# Patient Record
Sex: MALE | Race: WHITE | Employment: OTHER | URBAN - METROPOLITAN AREA
[De-identification: names, ages, dates, MRNs, and addresses within clinical notes are randomized per-mention and may not be internally consistent; named-entity substitution may affect disease eponyms.]

---

## 2017-09-20 ENCOUNTER — TELEPHONE (OUTPATIENT)
Dept: ADMINISTRATIVE | Age: 82
End: 2017-09-20

## 2019-11-11 RX ORDER — OXYBUTYNIN CHLORIDE 5 MG/1
TABLET ORAL
Qty: 180 TABLET | Refills: 0 | OUTPATIENT
Start: 2019-11-11

## 2019-11-11 RX ORDER — TAMSULOSIN HYDROCHLORIDE 0.4 MG/1
CAPSULE ORAL
Qty: 180 CAPSULE | Refills: 0 | OUTPATIENT
Start: 2019-11-11

## 2020-07-08 ENCOUNTER — TELEPHONE (OUTPATIENT)
Dept: INFECTIOUS DISEASES | Age: 85
End: 2020-07-08

## 2020-07-08 NOTE — LETTER
[facility] [address] [place] [address] [address]  [address] [address]  Phone: [phone]  Fax: [phone]    Facility:  [facility]    Fax:  [phone]  Ordering Provider: Dr. Shalonda Rosales. Alvarez    7/15/2020    Patient:  Javy Donato  MR Number:   [id number]  YOB: 1935    Please see below orders for:  Mr. Nikole Hillman       []   PICC Line Removal         []   Maintain PICC Line per Protocol Until:            []   Cath Flow Per Protocol       []   Alteplase Per Protocol       []   Repair/Replace PICC Line       [x]    Medication Therapy:  Continue Vanco and Ceftriaxone until 8/4/2020 along with  labs 3 times a week which are as follows WBC, Platelets, Creatinine, LFT, Vanco Trough- Please call Dr Michael Cowart with results at [phone] also please fax them to our office at [phone]- we have no lab results up to this point. If you have any questions or concerns, Please do not hesitate to call me. Verbally ordered and Approved by:Dr. Shalonda Rosales.  Michael Cowart

## 2020-07-15 NOTE — TELEPHONE ENCOUNTER
cont Vanco ceftriaoxne until August 4  WBC platelets creatinine LFTs and Vanco trough 3 times a week while on antibiotic   set up follow-up  Get lab results

## 2020-07-27 PROBLEM — S06.5XAA SUBDURAL HEMATOMA (HCC): Status: ACTIVE | Noted: 2019-02-09

## 2020-07-27 PROBLEM — R56.1 SEIZURE AFTER HEAD INJURY (HCC): Status: ACTIVE | Noted: 2019-02-15

## 2020-07-27 PROBLEM — E78.5 HYPERLIPIDEMIA: Status: ACTIVE | Noted: 2020-07-27

## 2020-07-27 PROBLEM — I05.9 MITRAL VALVE DISEASE: Status: ACTIVE | Noted: 2020-07-27

## 2020-07-27 PROBLEM — T84.54XA INFECTION OF PROSTHETIC LEFT KNEE JOINT (HCC): Status: ACTIVE | Noted: 2020-07-27

## 2020-07-27 PROBLEM — R06.02 SOB (SHORTNESS OF BREATH): Status: ACTIVE | Noted: 2020-07-27

## 2020-07-27 PROBLEM — E66.9 OBESITY: Status: ACTIVE | Noted: 2020-07-27

## 2020-07-27 RX ORDER — SENNA AND DOCUSATE SODIUM 50; 8.6 MG/1; MG/1
1 TABLET, FILM COATED ORAL DAILY
COMMUNITY

## 2020-07-27 RX ORDER — CEFTRIAXONE 2 G/1
2 INJECTION, POWDER, FOR SOLUTION INTRAMUSCULAR; INTRAVENOUS EVERY 24 HOURS
COMMUNITY

## 2020-07-27 RX ORDER — BISACODYL 10 MG
10 SUPPOSITORY, RECTAL RECTAL DAILY
COMMUNITY

## 2020-07-27 RX ORDER — HYDROCODONE BITARTRATE AND ACETAMINOPHEN 5; 325 MG/1; MG/1
1 TABLET ORAL EVERY 4 HOURS PRN
COMMUNITY
End: 2023-08-20

## 2020-07-27 RX ORDER — SENNOSIDES A AND B 8.6 MG/1
2 TABLET, FILM COATED ORAL NIGHTLY
COMMUNITY

## 2020-07-27 RX ORDER — ACETAMINOPHEN 500 MG
500 TABLET ORAL EVERY 6 HOURS PRN
COMMUNITY

## 2020-07-27 RX ORDER — SOLIFENACIN SUCCINATE 10 MG/1
5 TABLET, FILM COATED ORAL DAILY
COMMUNITY

## 2020-07-27 RX ORDER — ONDANSETRON 4 MG/1
4 TABLET, ORALLY DISINTEGRATING ORAL EVERY 8 HOURS PRN
COMMUNITY

## 2020-08-05 ENCOUNTER — TELEPHONE (OUTPATIENT)
Dept: INFECTIOUS DISEASES | Age: 85
End: 2020-08-05

## 2020-08-05 NOTE — TELEPHONE ENCOUNTER
Jamarcus Lee MD 8/4/2020 10:47 AM     Dr Kari Taylor, I received a fax from [facility]) on Janeann Deforest ([birth year]) reading last dose of antibiotics are 8/4/20. Can we pull PICC or do we need to maintain. Please advise. Phone #[phone]. Per dictation from [facility] on 6/30/20 impression and recommendation are as follows:    IMPRESSION:    1. 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 Hypertension I10    RECOMMENDATIONS:  cultures no growth so far  cont Vanco ceftriaoxne until August 4  WBC platelets creatinine LFTs and Vanco trough 3 times a week while on antibiotic  ID clinic in 2-3 weeks    *Patient is scheduled to see you in the office on 8/19/20. Please advise. Thank you, Giselle*     Read 8/4/2020 10:58 AM     8/4/2020 12:05 PM     Please advise. Thank you. Read 8/4/2020 1:37 PM     8/4/2020 1:37 PM     Let me see     8/4/2020 3:16 PM     Dr Kari Taylor, I am leaving for the day. If you have any orders on this, please follow up with Yuridia as she has a message out to you about this as well. Thanks, Giselle    Read 8/4/2020 3:17 PM     8/4/2020 3:18 PM     Remind me tomorrow in clinic     8/5/2020 8:25 AM     Dr Kari Taylor, this is your reminder to address this question about PICC removal. Thank you, Giselle     Read 8/5/2020 9:34 AM     8/5/2020 9:36 AM     D/c picc    *I called [facility] and spoke with Gabe Trejo. I informed her of Dr Chang Christopher order to d/c PICC. She voiced understanding. *

## 2020-08-19 ENCOUNTER — OFFICE VISIT (OUTPATIENT)
Dept: INFECTIOUS DISEASES | Age: 85
End: 2020-08-19
Payer: MEDICARE

## 2020-08-19 VITALS
DIASTOLIC BLOOD PRESSURE: 70 MMHG | SYSTOLIC BLOOD PRESSURE: 145 MMHG | HEART RATE: 73 BPM | HEIGHT: 69 IN | WEIGHT: 190 LBS | BODY MASS INDEX: 28.14 KG/M2 | TEMPERATURE: 97.4 F

## 2020-08-19 PROCEDURE — 99213 OFFICE O/P EST LOW 20 MIN: CPT | Performed by: INTERNAL MEDICINE

## 2020-08-19 PROCEDURE — G8427 DOCREV CUR MEDS BY ELIG CLIN: HCPCS | Performed by: INTERNAL MEDICINE

## 2020-08-19 PROCEDURE — 1123F ACP DISCUSS/DSCN MKR DOCD: CPT | Performed by: INTERNAL MEDICINE

## 2020-08-19 PROCEDURE — 4040F PNEUMOC VAC/ADMIN/RCVD: CPT | Performed by: INTERNAL MEDICINE

## 2020-08-19 PROCEDURE — G8417 CALC BMI ABV UP PARAM F/U: HCPCS | Performed by: INTERNAL MEDICINE

## 2020-08-19 PROCEDURE — 1036F TOBACCO NON-USER: CPT | Performed by: INTERNAL MEDICINE

## 2020-08-19 NOTE — LETTER
[facility]  [address] [place] [address],  [address]   [address]  [place], [address] [place] [address] Street  Phone: [phone]  Fax: [phone]                  SURGERY CLEARANCE FORM       Richard Aleman  1935 8/19/2020      Dear Dr. Clara Velasquez you for having me participate in the preoperative evaluation of your patient, Richard Aleman. Richard Aleman is cleared for surgery for knee replacement     I have made the following recommendations:      Richard Aleman will need:    antibiotic as per surgery protocol for knee replcement    If you have any questions, please feel free to call me.      Sincerely,      Juan Jose Nova

## 2020-08-19 NOTE — PROGRESS NOTES
[facility]    Follow-up NOTE           Visit date: 8/19/2020      Reason for visit /chief complaints   Knee infection    Assessment:      Diagnosis Orders   1. 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  Hypertension I10      Plan:     Completed vancomycin and ceftriaxone August 4  No objection new knee placement  Antibiotic as per perioperative protocol as per surgery      HPI:    Patient is 80-year-old male came in for follow-up for discharge from [facility].    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 ([address] 12/23/2008    Benign hypertensive heart disease without congestive heart failure 3/19/2007    CAD (coronary artery disease)     Chronic ischemic heart disease 11/27/2007    Hyperlipidemia 7/27/2020    Hypertension     Infection of prosthetic left knee joint ([address] 7/27/2020    Mitral valve disease 7/27/2020    Obesity 7/27/2020    Presence of aortocoronary bypass graft 3/19/2007    Presence of coronary angioplasty implant and graft 7/10/2006    Pure hypercholesterolemia 3/19/2007    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 ([address] 2/15/2019    SOB (shortness of breath) 7/27/2020    Spinal stenosis, lumbar region, without neurogenic claudication 12/12/2013    Subdural hematoma ([facility] 2/9/2019     Past Surgical History:   Procedure Laterality Date    ANKLE SURGERY Bilateral     RIGHT THEN LEFT- PINS    CARDIAC SURGERY      TRIPLE BYPASS    CARDIAC SURGERY      STENTS X 5    JOINT REPLACEMENT Right     KNEE REPLACEMENT    NERVE BLOCK  1/21/14    Duramorph celestone 9 mg     Social History     Socioeconomic History    Marital status:      Spouse name: None    Number of children: None    Years of education: None    Highest education level: None   Occupational History    Occupation: none     Employer: NONE   Social Needs    Financial resource strain: None    Food insecurity     Worry: None     Inability: None    Transportation needs     Medical: None     Non-medical: None   Tobacco Use    Smoking status: Former Smoker    Smokeless tobacco: Never Used   Substance and Sexual Activity    Alcohol use:  Yes    Drug use: No    Sexual activity: None   Lifestyle    Physical activity     Days per week: None     Minutes per session: None    Stress: None   Relationships    Social connections     Talks on phone: None     Gets together: None     Attends Worship service: None     Active member of club or organization: None     Attends meetings of clubs or organizations: None     Relationship status: None    Intimate partner violence     Fear of current or ex partner: None     Emotionally abused: None     Physically abused: None     Forced sexual activity: None   Other Topics Concern    None   Social History Narrative    None     Family History   Problem Relation Age of Onset    High Blood Pressure Mother     Heart Disease Mother     Cancer Mother     Heart Disease Father     High Blood Pressure Father        Current med     Current Outpatient Medications:     Calcium Carb-Cholecalciferol (CALCIUM-VITAMIN D) 500-200 MG-UNIT per tablet, Take 1 tablet by mouth 2 times daily (with meals), Disp: , Rfl:     bisacodyl (DULCOLAX) 10 MG suppository, Place 10 mg rectally daily, Disp: , Rfl:     sennosides-docusate sodium (SENOKOT-S) 8.6-50 MG tablet, Take 1 tablet by mouth daily, Disp: , Rfl:     HYDROcodone-acetaminophen (NORCO) 5-325 MG per tablet, Take 1 tablet by mouth every 4 hours as needed for Pain., Disp: , Rfl:     senna (SM SENNA LAXATIVE) 8.6 MG tablet, Take 2 tablets by mouth nightly, Disp: , Rfl:     solifenacin (VESICARE) 10 MG tablet, Take 5 mg by mouth daily, Disp: , Rfl:     carvedilol (COREG) 12.5 MG tablet, , Disp: , Rfl:     Handicap Placard MISC, by Does not apply route Expires in 5 years. , Disp: 1 each, Rfl: 0    traMADol (ULTRAM) 50 MG tablet, Take 1 tablet by mouth every 6 hours as needed, Disp: 90 tablet, Rfl: 0    rosuvastatin (CRESTOR) 10 MG tablet, Take 10 mg by mouth daily. , Disp: , Rfl:     aspirin 81 MG tablet, Take 81 mg by mouth daily. , Disp: , Rfl:     nitroGLYCERIN (NITROSTAT) 0.4 MG SL tablet, Place 0.4 mg under the tongue every 5 minutes as needed. , Disp: , Rfl:     tamsulosin (FLOMAX) 0.4 MG capsule, Take 0.4 mg by mouth daily. , Disp: , Rfl:     ondansetron (ZOFRAN-ODT) 4 MG disintegrating tablet, Take 4 mg by mouth every 8 hours as needed for Nausea or Vomiting, Disp: , Rfl:     acetaminophen (TYLENOL) 500 MG tablet, Take 500 mg by mouth every 6 hours as needed for Pain, Disp: , Rfl:     cefTRIAXone sodium 2 g SOLR, Infuse 2 g intravenously every 24 hours, Disp: , Rfl:     magnesium hydroxide (MILK OF MAGNESIA) 400 MG/5ML suspension, Take 30 mLs by mouth 2 times daily as needed for Constipation, Disp: , Rfl:     levETIRAcetam (KEPPRA) 500 MG tablet, Take 500 mg by mouth 2 times daily, Disp: , Rfl:      Review of Systems:  CONSTITUTIONAL:  negative  EYES:  negative  HEENT:  negative  RESPIRATORY:  negative  CARDIOVASCULAR:  negative  GASTROINTESTINAL:  negative  GENITOURINARY:  negative  INTEGUMENT/BREAST: negative  HEMATOLOGIC/LYMPHATIC:  negative  ALLERGIC/IMMUNOLOGIC:  negative  ENDOCRINE:  negative  MUSCULOSKELETAL:  negative  NEUROLOGICAL:  negative  BEHAVIOR/PSYCH:  negative    BP (!) 145/70 (Site: Right Upper Arm)   Pulse 73   Temp 97.4 °F (36.3 °C)   Ht 5' 9\" (1.753 m)   Wt 190 lb (86.2 kg)   BMI 28.06 kg/m²       EXAM:  CONSTITUTIONAL:  awake, alert, cooperative, no apparent distress,  EYES: conjunctiva normal  ENT:  Normocephalic, without obvious abnormality, atraumatic,  LUNGS:  No increased work of breathing, good air exchange, clear to auscultation bilaterally, no crackles or wheezing  CARDIOVASCULAR:  regular rate and rhythm, normal S1 and S2,  no murmur noted  ABDOMEN:   normal bowel sounds, soft, non-distended, non-tender, no masses palpated, no hepatosplenomegally,   MUSCULOSKELETAL:  There is no redness, warmth, or swelling of the joints. NEUROLOGIC:  Awake, alert, oriented to name, place and time  SKIN:  No rash      Data Review:  Reviewed  IMAGING:          Perez Merrill MD  8/19/2020  12:18 PM    This note was completed using a voice transcription system. Every effort was made to ensure accuracy. However, inadvertent computerized transcription errors may be present.

## 2020-12-31 ENCOUNTER — HOSPITAL ENCOUNTER (OUTPATIENT)
Age: 85
Setting detail: SPECIMEN
Discharge: HOME | End: 2020-12-31
Payer: MEDICARE

## 2020-12-31 LAB
ABSOLUTE EOS #: 0.35 K/UL (ref 0–0.44)
ABSOLUTE IMMATURE GRANULOCYTE: 0.03 K/UL (ref 0–0.3)
ABSOLUTE LYMPH #: 2.42 K/UL (ref 1.1–3.7)
ABSOLUTE MONO #: 0.58 K/UL (ref 0.1–1.2)
ALBUMIN SERPL-MCNC: 3.3 G/DL (ref 3.5–5.2)
ALBUMIN/GLOBULIN RATIO: 1.3 (ref 1–2.5)
ALP BLD-CCNC: 59 U/L (ref 40–129)
ALT SERPL-CCNC: 6 U/L (ref 5–41)
ANION GAP SERPL CALCULATED.3IONS-SCNC: 11 MMOL/L (ref 9–17)
AST SERPL-CCNC: 14 U/L
BASOPHILS # BLD: 0 % (ref 0–2)
BASOPHILS ABSOLUTE: <0.03 K/UL (ref 0–0.2)
BILIRUB SERPL-MCNC: 0.46 MG/DL (ref 0.3–1.2)
BUN BLDV-MCNC: 27 MG/DL (ref 8–23)
BUN/CREAT BLD: ABNORMAL (ref 9–20)
CALCIUM SERPL-MCNC: 8.8 MG/DL (ref 8.6–10.4)
CHLORIDE BLD-SCNC: 106 MMOL/L (ref 98–107)
CHOLESTEROL/HDL RATIO: 2.2
CHOLESTEROL: 95 MG/DL
CO2: 27 MMOL/L (ref 20–31)
CREAT SERPL-MCNC: 1.04 MG/DL (ref 0.7–1.2)
DIFFERENTIAL TYPE: ABNORMAL
EOSINOPHILS RELATIVE PERCENT: 6 % (ref 1–4)
GFR AFRICAN AMERICAN: >60 ML/MIN
GFR NON-AFRICAN AMERICAN: >60 ML/MIN
GFR SERPL CREATININE-BSD FRML MDRD: ABNORMAL ML/MIN/{1.73_M2}
GFR SERPL CREATININE-BSD FRML MDRD: ABNORMAL ML/MIN/{1.73_M2}
GLUCOSE BLD-MCNC: 84 MG/DL (ref 70–99)
HCT VFR BLD CALC: 39.1 % (ref 40.7–50.3)
HDLC SERPL-MCNC: 43 MG/DL
HEMOGLOBIN: 12.3 G/DL (ref 13–17)
IMMATURE GRANULOCYTES: 1 %
LDL CHOLESTEROL: 38 MG/DL (ref 0–130)
LYMPHOCYTES # BLD: 40 % (ref 24–43)
MCH RBC QN AUTO: 29.8 PG (ref 25.2–33.5)
MCHC RBC AUTO-ENTMCNC: 31.5 G/DL (ref 28.4–34.8)
MCV RBC AUTO: 94.7 FL (ref 82.6–102.9)
MONOCYTES # BLD: 10 % (ref 3–12)
NRBC AUTOMATED: 0 PER 100 WBC
PDW BLD-RTO: 13.9 % (ref 11.8–14.4)
PLATELET # BLD: 152 K/UL (ref 138–453)
PLATELET ESTIMATE: ABNORMAL
PMV BLD AUTO: 10.8 FL (ref 8.1–13.5)
POTASSIUM SERPL-SCNC: 4.2 MMOL/L (ref 3.7–5.3)
RBC # BLD: 4.13 M/UL (ref 4.21–5.77)
RBC # BLD: ABNORMAL 10*6/UL
SEG NEUTROPHILS: 43 % (ref 36–65)
SEGMENTED NEUTROPHILS ABSOLUTE COUNT: 2.71 K/UL (ref 1.5–8.1)
SODIUM BLD-SCNC: 144 MMOL/L (ref 135–144)
TOTAL PROTEIN: 5.8 G/DL (ref 6.4–8.3)
TRIGL SERPL-MCNC: 68 MG/DL
VITAMIN D 25-HYDROXY: 28.2 NG/ML (ref 30–100)
VLDLC SERPL CALC-MCNC: NORMAL MG/DL (ref 1–30)
WBC # BLD: 6.1 K/UL (ref 3.5–11.3)
WBC # BLD: ABNORMAL 10*3/UL

## 2020-12-31 PROCEDURE — 85025 COMPLETE CBC W/AUTO DIFF WBC: CPT

## 2020-12-31 PROCEDURE — 36415 COLL VENOUS BLD VENIPUNCTURE: CPT

## 2020-12-31 PROCEDURE — 80053 COMPREHEN METABOLIC PANEL: CPT

## 2020-12-31 PROCEDURE — 82306 VITAMIN D 25 HYDROXY: CPT

## 2020-12-31 PROCEDURE — 80061 LIPID PANEL: CPT

## 2020-12-31 PROCEDURE — P9603 ONE-WAY ALLOW PRORATED MILES: HCPCS

## 2021-01-28 ENCOUNTER — HOSPITAL ENCOUNTER (OUTPATIENT)
Age: 86
Setting detail: SPECIMEN
Discharge: HOME | End: 2021-01-28
Payer: MEDICARE

## 2021-01-28 LAB
ANION GAP SERPL CALCULATED.3IONS-SCNC: 8 MMOL/L (ref 9–17)
BUN BLDV-MCNC: 39 MG/DL (ref 8–23)
BUN/CREAT BLD: 31 (ref 9–20)
CALCIUM SERPL-MCNC: 9.1 MG/DL (ref 8.6–10.4)
CHLORIDE BLD-SCNC: 102 MMOL/L (ref 98–107)
CO2: 30 MMOL/L (ref 20–31)
CREAT SERPL-MCNC: 1.26 MG/DL (ref 0.7–1.2)
GFR AFRICAN AMERICAN: >60 ML/MIN
GFR NON-AFRICAN AMERICAN: 54 ML/MIN
GFR SERPL CREATININE-BSD FRML MDRD: ABNORMAL ML/MIN/{1.73_M2}
GFR SERPL CREATININE-BSD FRML MDRD: ABNORMAL ML/MIN/{1.73_M2}
GLUCOSE BLD-MCNC: 102 MG/DL (ref 70–99)
POTASSIUM SERPL-SCNC: 4.3 MMOL/L (ref 3.7–5.3)
SODIUM BLD-SCNC: 140 MMOL/L (ref 135–144)

## 2021-01-28 PROCEDURE — P9603 ONE-WAY ALLOW PRORATED MILES: HCPCS

## 2021-01-28 PROCEDURE — 80048 BASIC METABOLIC PNL TOTAL CA: CPT

## 2021-01-28 PROCEDURE — 36415 COLL VENOUS BLD VENIPUNCTURE: CPT

## 2022-08-10 ENCOUNTER — HOSPITAL ENCOUNTER (OUTPATIENT)
Age: 87
Setting detail: SPECIMEN
Discharge: HOME | End: 2022-08-10
Payer: MEDICARE

## 2022-08-10 LAB
ALBUMIN SERPL-MCNC: 3.8 G/DL (ref 3.5–5.2)
ALBUMIN/GLOBULIN RATIO: 1.4 (ref 1–2.5)
ALP BLD-CCNC: 63 U/L (ref 40–129)
ALT SERPL-CCNC: 15 U/L (ref 5–41)
ANION GAP SERPL CALCULATED.3IONS-SCNC: 12 MMOL/L (ref 9–17)
AST SERPL-CCNC: 21 U/L
BILIRUB SERPL-MCNC: 0.51 MG/DL (ref 0.3–1.2)
BILIRUBIN DIRECT: 0.16 MG/DL
BILIRUBIN, INDIRECT: 0.35 MG/DL (ref 0–1)
BUN BLDV-MCNC: 23 MG/DL (ref 8–23)
CALCIUM SERPL-MCNC: 9.2 MG/DL (ref 8.6–10.4)
CHLORIDE BLD-SCNC: 104 MMOL/L (ref 98–107)
CHOLESTEROL/HDL RATIO: 2.1
CHOLESTEROL: 106 MG/DL
CO2: 28 MMOL/L (ref 20–31)
CREAT SERPL-MCNC: 0.99 MG/DL (ref 0.7–1.2)
ESTIMATED AVERAGE GLUCOSE: 105 MG/DL
GFR AFRICAN AMERICAN: >60 ML/MIN
GFR NON-AFRICAN AMERICAN: >60 ML/MIN
GFR SERPL CREATININE-BSD FRML MDRD: NORMAL ML/MIN/{1.73_M2}
GLUCOSE BLD-MCNC: 89 MG/DL (ref 70–99)
HBA1C MFR BLD: 5.3 % (ref 4–6)
HCT VFR BLD CALC: 43.1 % (ref 40.7–50.3)
HDLC SERPL-MCNC: 50 MG/DL
HEMOGLOBIN: 13.5 G/DL (ref 13–17)
LDL CHOLESTEROL: 42 MG/DL (ref 0–130)
MCH RBC QN AUTO: 30.1 PG (ref 25.2–33.5)
MCHC RBC AUTO-ENTMCNC: 31.3 G/DL (ref 28.4–34.8)
MCV RBC AUTO: 96.2 FL (ref 82.6–102.9)
NRBC AUTOMATED: 0 PER 100 WBC
PDW BLD-RTO: 12.6 % (ref 11.8–14.4)
PLATELET # BLD: 145 K/UL (ref 138–453)
PMV BLD AUTO: 11.6 FL (ref 8.1–13.5)
POTASSIUM SERPL-SCNC: 3.9 MMOL/L (ref 3.7–5.3)
PROSTATE SPECIFIC ANTIGEN: 3.07 NG/ML
RBC # BLD: 4.48 M/UL (ref 4.21–5.77)
SODIUM BLD-SCNC: 144 MMOL/L (ref 135–144)
TOTAL PROTEIN: 6.6 G/DL (ref 6.4–8.3)
TRIGL SERPL-MCNC: 68 MG/DL
TSH SERPL DL<=0.05 MIU/L-ACNC: 2.26 UIU/ML (ref 0.3–5)
VITAMIN B-12: 313 PG/ML (ref 232–1245)
VITAMIN D 25-HYDROXY: 36.5 NG/ML
WBC # BLD: 6.9 K/UL (ref 3.5–11.3)

## 2022-08-10 PROCEDURE — 82607 VITAMIN B-12: CPT

## 2022-08-10 PROCEDURE — 36415 COLL VENOUS BLD VENIPUNCTURE: CPT

## 2022-08-10 PROCEDURE — 80076 HEPATIC FUNCTION PANEL: CPT

## 2022-08-10 PROCEDURE — P9603 ONE-WAY ALLOW PRORATED MILES: HCPCS

## 2022-08-10 PROCEDURE — 80061 LIPID PANEL: CPT

## 2022-08-10 PROCEDURE — 83036 HEMOGLOBIN GLYCOSYLATED A1C: CPT

## 2022-08-10 PROCEDURE — 82306 VITAMIN D 25 HYDROXY: CPT

## 2022-08-10 PROCEDURE — 84443 ASSAY THYROID STIM HORMONE: CPT

## 2022-08-10 PROCEDURE — 80048 BASIC METABOLIC PNL TOTAL CA: CPT

## 2022-08-10 PROCEDURE — 85027 COMPLETE CBC AUTOMATED: CPT

## 2022-08-10 PROCEDURE — 84153 ASSAY OF PSA TOTAL: CPT

## 2023-03-22 ENCOUNTER — HOSPITAL ENCOUNTER (OUTPATIENT)
Age: 88
Setting detail: SPECIMEN
Discharge: HOME | End: 2023-03-22
Payer: MEDICARE

## 2023-03-22 LAB
ALBUMIN SERPL-MCNC: 3.5 G/DL (ref 3.5–5.2)
ALBUMIN/GLOBULIN RATIO: 1.3 (ref 1–2.5)
ALP SERPL-CCNC: 48 U/L (ref 40–129)
ALT SERPL-CCNC: 12 U/L (ref 5–41)
ANION GAP SERPL CALCULATED.3IONS-SCNC: 11 MMOL/L (ref 9–17)
AST SERPL-CCNC: 20 U/L
BILIRUB DIRECT SERPL-MCNC: 0.1 MG/DL
BILIRUB INDIRECT SERPL-MCNC: 0.4 MG/DL (ref 0–1)
BILIRUB SERPL-MCNC: 0.5 MG/DL (ref 0.3–1.2)
BUN SERPL-MCNC: 23 MG/DL (ref 8–23)
CALCIUM SERPL-MCNC: 9.2 MG/DL (ref 8.6–10.4)
CHLORIDE SERPL-SCNC: 103 MMOL/L (ref 98–107)
CHOLEST SERPL-MCNC: 102 MG/DL
CHOLESTEROL/HDL RATIO: 2
CO2 SERPL-SCNC: 28 MMOL/L (ref 20–31)
CREAT SERPL-MCNC: 1.08 MG/DL (ref 0.7–1.2)
EST. AVERAGE GLUCOSE BLD GHB EST-MCNC: 111 MG/DL
GFR SERPL CREATININE-BSD FRML MDRD: >60 ML/MIN/1.73M2
GLUCOSE SERPL-MCNC: 86 MG/DL (ref 70–99)
HBA1C MFR BLD: 5.5 % (ref 4–6)
HCT VFR BLD AUTO: 39.8 % (ref 40.7–50.3)
HDLC SERPL-MCNC: 51 MG/DL
HGB BLD-MCNC: 13.1 G/DL (ref 13–17)
LDLC SERPL CALC-MCNC: 37 MG/DL (ref 0–130)
MCH RBC QN AUTO: 30.5 PG (ref 25.2–33.5)
MCHC RBC AUTO-ENTMCNC: 32.9 G/DL (ref 28.4–34.8)
MCV RBC AUTO: 92.8 FL (ref 82.6–102.9)
NRBC AUTOMATED: 0 PER 100 WBC
PDW BLD-RTO: 12.8 % (ref 11.8–14.4)
PLATELET # BLD AUTO: ABNORMAL K/UL (ref 138–453)
PLATELET, FLUORESCENCE: 139 K/UL (ref 138–453)
PLATELET, IMMATURE FRACTION: 4.2 % (ref 1.1–10.3)
POTASSIUM SERPL-SCNC: 4 MMOL/L (ref 3.7–5.3)
PROT SERPL-MCNC: 6.1 G/DL (ref 6.4–8.3)
RBC # BLD: 4.29 M/UL (ref 4.21–5.77)
SODIUM SERPL-SCNC: 142 MMOL/L (ref 135–144)
TRIGL SERPL-MCNC: 69 MG/DL
TSH SERPL-ACNC: 2.03 UIU/ML (ref 0.3–5)
WBC # BLD AUTO: 6.4 K/UL (ref 3.5–11.3)

## 2023-03-22 PROCEDURE — 85027 COMPLETE CBC AUTOMATED: CPT

## 2023-03-22 PROCEDURE — 84443 ASSAY THYROID STIM HORMONE: CPT

## 2023-03-22 PROCEDURE — 80061 LIPID PANEL: CPT

## 2023-03-22 PROCEDURE — 80048 BASIC METABOLIC PNL TOTAL CA: CPT

## 2023-03-22 PROCEDURE — 36415 COLL VENOUS BLD VENIPUNCTURE: CPT

## 2023-03-22 PROCEDURE — 83036 HEMOGLOBIN GLYCOSYLATED A1C: CPT

## 2023-03-22 PROCEDURE — 80076 HEPATIC FUNCTION PANEL: CPT

## 2023-03-22 PROCEDURE — P9603 ONE-WAY ALLOW PRORATED MILES: HCPCS

## 2023-03-22 PROCEDURE — 85055 RETICULATED PLATELET ASSAY: CPT

## 2023-08-19 ENCOUNTER — APPOINTMENT (OUTPATIENT)
Dept: CT IMAGING | Age: 88
End: 2023-08-19
Payer: MEDICARE

## 2023-08-19 ENCOUNTER — HOSPITAL ENCOUNTER (EMERGENCY)
Age: 88
Discharge: HOME | End: 2023-08-19
Attending: EMERGENCY MEDICINE
Payer: MEDICARE

## 2023-08-19 VITALS
HEIGHT: 70 IN | DIASTOLIC BLOOD PRESSURE: 82 MMHG | WEIGHT: 180 LBS | TEMPERATURE: 98.2 F | HEART RATE: 63 BPM | BODY MASS INDEX: 25.77 KG/M2 | RESPIRATION RATE: 20 BRPM | SYSTOLIC BLOOD PRESSURE: 174 MMHG | OXYGEN SATURATION: 96 %

## 2023-08-19 DIAGNOSIS — N12 PYELONEPHRITIS: Primary | ICD-10-CM

## 2023-08-19 LAB
ALBUMIN SERPL-MCNC: 4 G/DL (ref 3.5–5.2)
ALP SERPL-CCNC: 67 U/L (ref 40–129)
ALT SERPL-CCNC: 11 U/L (ref 5–41)
ANION GAP SERPL CALCULATED.3IONS-SCNC: 8 MMOL/L (ref 9–17)
AST SERPL-CCNC: 18 U/L
BACTERIA URNS QL MICRO: ABNORMAL
BASOPHILS # BLD: 0 K/UL (ref 0–0.2)
BASOPHILS NFR BLD: 1 % (ref 0–2)
BILIRUB SERPL-MCNC: 0.5 MG/DL (ref 0.3–1.2)
BILIRUB UR QL STRIP: NEGATIVE
BUN SERPL-MCNC: 20 MG/DL (ref 8–23)
CALCIUM SERPL-MCNC: 9.4 MG/DL (ref 8.6–10.4)
CASTS #/AREA URNS LPF: ABNORMAL /LPF
CHLORIDE SERPL-SCNC: 97 MMOL/L (ref 98–107)
CLARITY UR: ABNORMAL
CO2 SERPL-SCNC: 32 MMOL/L (ref 20–31)
COLOR UR: YELLOW
CREAT SERPL-MCNC: 1.1 MG/DL (ref 0.7–1.2)
EKG ATRIAL RATE: 59 BPM
EKG Q-T INTERVAL: 408 MS
EKG QRS DURATION: 90 MS
EKG QTC CALCULATION (BAZETT): 410 MS
EKG R AXIS: -11 DEGREES
EKG T AXIS: 26 DEGREES
EKG VENTRICULAR RATE: 61 BPM
EOSINOPHIL # BLD: 0.3 K/UL (ref 0–0.4)
EOSINOPHILS RELATIVE PERCENT: 4 % (ref 0–4)
EPI CELLS #/AREA URNS HPF: ABNORMAL /HPF
ERYTHROCYTE [DISTWIDTH] IN BLOOD BY AUTOMATED COUNT: 13 % (ref 11.5–14.9)
GFR SERPL CREATININE-BSD FRML MDRD: >60 ML/MIN/1.73M2
GLUCOSE SERPL-MCNC: 105 MG/DL (ref 70–99)
GLUCOSE UR STRIP-MCNC: NEGATIVE MG/DL
HCT VFR BLD AUTO: 43.2 % (ref 41–53)
HGB BLD-MCNC: 14.2 G/DL (ref 13.5–17.5)
HGB UR QL STRIP.AUTO: ABNORMAL
KETONES UR STRIP-MCNC: ABNORMAL MG/DL
LACTATE BLDV-SCNC: 0.8 MMOL/L (ref 0.5–2.2)
LEUKOCYTE ESTERASE UR QL STRIP: ABNORMAL
LIPASE SERPL-CCNC: 25 U/L (ref 13–60)
LYMPHOCYTES NFR BLD: 2.1 K/UL (ref 1–4.8)
LYMPHOCYTES RELATIVE PERCENT: 27 % (ref 24–44)
MCH RBC QN AUTO: 30.1 PG (ref 26–34)
MCHC RBC AUTO-ENTMCNC: 32.8 G/DL (ref 31–37)
MCV RBC AUTO: 91.8 FL (ref 80–100)
MONOCYTES NFR BLD: 0.8 K/UL (ref 0.1–1.3)
MONOCYTES NFR BLD: 11 % (ref 1–7)
NEUTROPHILS NFR BLD: 57 % (ref 36–66)
NEUTS SEG NFR BLD: 4.5 K/UL (ref 1.3–9.1)
NITRITE UR QL STRIP: NEGATIVE
PH UR STRIP: 6 [PH] (ref 5–8)
PLATELET # BLD AUTO: 159 K/UL (ref 150–450)
PMV BLD AUTO: 8.5 FL (ref 6–12)
POTASSIUM SERPL-SCNC: 4.2 MMOL/L (ref 3.7–5.3)
PROT SERPL-MCNC: 7.1 G/DL (ref 6.4–8.3)
PROT UR STRIP-MCNC: ABNORMAL MG/DL
RBC # BLD AUTO: 4.7 M/UL (ref 4.5–5.9)
RBC #/AREA URNS HPF: ABNORMAL /HPF
SODIUM SERPL-SCNC: 137 MMOL/L (ref 135–144)
SP GR UR STRIP: 1.02 (ref 1–1.03)
TROPONIN I SERPL HS-MCNC: 31 NG/L (ref 0–22)
UROBILINOGEN UR STRIP-ACNC: NORMAL EU/DL (ref 0–1)
WBC #/AREA URNS HPF: ABNORMAL /HPF
WBC OTHER # BLD: 7.7 K/UL (ref 3.5–11)

## 2023-08-19 PROCEDURE — 74177 CT ABD & PELVIS W/CONTRAST: CPT

## 2023-08-19 PROCEDURE — 83605 ASSAY OF LACTIC ACID: CPT

## 2023-08-19 PROCEDURE — 96365 THER/PROPH/DIAG IV INF INIT: CPT

## 2023-08-19 PROCEDURE — 6360000002 HC RX W HCPCS: Performed by: EMERGENCY MEDICINE

## 2023-08-19 PROCEDURE — 6360000004 HC RX CONTRAST MEDICATION: Performed by: EMERGENCY MEDICINE

## 2023-08-19 PROCEDURE — 85025 COMPLETE CBC W/AUTO DIFF WBC: CPT

## 2023-08-19 PROCEDURE — 84484 ASSAY OF TROPONIN QUANT: CPT

## 2023-08-19 PROCEDURE — 93005 ELECTROCARDIOGRAM TRACING: CPT | Performed by: EMERGENCY MEDICINE

## 2023-08-19 PROCEDURE — 83690 ASSAY OF LIPASE: CPT

## 2023-08-19 PROCEDURE — 81001 URINALYSIS AUTO W/SCOPE: CPT

## 2023-08-19 PROCEDURE — 2580000003 HC RX 258: Performed by: EMERGENCY MEDICINE

## 2023-08-19 PROCEDURE — 80053 COMPREHEN METABOLIC PANEL: CPT

## 2023-08-19 PROCEDURE — 36415 COLL VENOUS BLD VENIPUNCTURE: CPT

## 2023-08-19 PROCEDURE — 99285 EMERGENCY DEPT VISIT HI MDM: CPT

## 2023-08-19 PROCEDURE — 6370000000 HC RX 637 (ALT 250 FOR IP): Performed by: EMERGENCY MEDICINE

## 2023-08-19 RX ORDER — CIPROFLOXACIN 500 MG/1
500 TABLET, FILM COATED ORAL 2 TIMES DAILY
Qty: 14 TABLET | Refills: 0 | Status: SHIPPED | OUTPATIENT
Start: 2023-08-19 | End: 2023-08-26

## 2023-08-19 RX ORDER — 0.9 % SODIUM CHLORIDE 0.9 %
80 INTRAVENOUS SOLUTION INTRAVENOUS ONCE
Status: COMPLETED | OUTPATIENT
Start: 2023-08-19 | End: 2023-08-19

## 2023-08-19 RX ORDER — CEPHALEXIN 500 MG/1
500 CAPSULE ORAL 2 TIMES DAILY
Qty: 14 CAPSULE | Refills: 0 | Status: SHIPPED | OUTPATIENT
Start: 2023-08-19 | End: 2023-08-19 | Stop reason: CLARIF

## 2023-08-19 RX ORDER — ACETAMINOPHEN 325 MG/1
650 TABLET ORAL ONCE
Status: COMPLETED | OUTPATIENT
Start: 2023-08-19 | End: 2023-08-19

## 2023-08-19 RX ORDER — SODIUM CHLORIDE 0.9 % (FLUSH) 0.9 %
10 SYRINGE (ML) INJECTION PRN
Status: DISCONTINUED | OUTPATIENT
Start: 2023-08-19 | End: 2023-08-19 | Stop reason: HOSPADM

## 2023-08-19 RX ADMIN — SODIUM CHLORIDE, PRESERVATIVE FREE 10 ML: 5 INJECTION INTRAVENOUS at 12:39

## 2023-08-19 RX ADMIN — IOPAMIDOL 75 ML: 755 INJECTION, SOLUTION INTRAVENOUS at 12:33

## 2023-08-19 RX ADMIN — SODIUM CHLORIDE 80 ML: 9 INJECTION, SOLUTION INTRAVENOUS at 12:40

## 2023-08-19 RX ADMIN — SODIUM CHLORIDE 1000 MG: 900 INJECTION INTRAVENOUS at 14:00

## 2023-08-19 RX ADMIN — ACETAMINOPHEN 650 MG: 325 TABLET ORAL at 15:09

## 2023-08-19 ASSESSMENT — ENCOUNTER SYMPTOMS
VOMITING: 0
ABDOMINAL PAIN: 0
SORE THROAT: 0
DIARRHEA: 0
CONSTIPATION: 0
BACK PAIN: 0
COUGH: 0
SHORTNESS OF BREATH: 0
CHEST TIGHTNESS: 0
EYE PAIN: 0
NAUSEA: 0

## 2023-08-19 ASSESSMENT — PAIN - FUNCTIONAL ASSESSMENT: PAIN_FUNCTIONAL_ASSESSMENT: 0-10

## 2023-08-19 ASSESSMENT — PAIN SCALES - GENERAL: PAINLEVEL_OUTOF10: 7

## 2023-08-19 NOTE — ED TRIAGE NOTES
Mode of arrival (squad #, walk in, police, etc) : walk-in        Chief complaint(s): flank pain        Arrival Note (brief scenario, treatment PTA, etc). : Pt reports worsening flank pain x2 weeks. C= \"Have you ever felt that you should Cut down on your drinking? \"  No  A= \"Have people Annoyed you by criticizing your drinking? \"  No  G= \"Have you ever felt bad or Guilty about your drinking? \"  No  E= \"Have you ever had a drink as an Eye-opener first thing in the morning to steady your nerves or to help a hangover? \"  No      Deferred []      Reason for deferring: N/A    *If yes to two or more: probable alcohol abuse. *

## 2023-08-20 ENCOUNTER — HOSPITAL ENCOUNTER (EMERGENCY)
Age: 88
Discharge: HOME | End: 2023-08-20
Attending: EMERGENCY MEDICINE
Payer: MEDICARE

## 2023-08-20 VITALS
RESPIRATION RATE: 18 BRPM | HEART RATE: 66 BPM | HEIGHT: 70 IN | SYSTOLIC BLOOD PRESSURE: 129 MMHG | OXYGEN SATURATION: 95 % | WEIGHT: 180 LBS | DIASTOLIC BLOOD PRESSURE: 69 MMHG | TEMPERATURE: 98.5 F | BODY MASS INDEX: 25.77 KG/M2

## 2023-08-20 DIAGNOSIS — N12 PYELONEPHRITIS: Primary | ICD-10-CM

## 2023-08-20 LAB
ALBUMIN SERPL-MCNC: 3.6 G/DL (ref 3.5–5.2)
ALP SERPL-CCNC: 65 U/L (ref 40–129)
ALT SERPL-CCNC: 13 U/L (ref 5–41)
ANION GAP SERPL CALCULATED.3IONS-SCNC: 9 MMOL/L (ref 9–17)
AST SERPL-CCNC: 20 U/L
BASOPHILS # BLD: 0.1 K/UL (ref 0–0.2)
BASOPHILS NFR BLD: 1 % (ref 0–2)
BILIRUB SERPL-MCNC: 0.3 MG/DL (ref 0.3–1.2)
BUN SERPL-MCNC: 19 MG/DL (ref 8–23)
CALCIUM SERPL-MCNC: 9.2 MG/DL (ref 8.6–10.4)
CHLORIDE SERPL-SCNC: 103 MMOL/L (ref 98–107)
CO2 SERPL-SCNC: 30 MMOL/L (ref 20–31)
CREAT SERPL-MCNC: 1.2 MG/DL (ref 0.7–1.2)
EOSINOPHIL # BLD: 0.3 K/UL (ref 0–0.4)
EOSINOPHILS RELATIVE PERCENT: 4 % (ref 0–4)
ERYTHROCYTE [DISTWIDTH] IN BLOOD BY AUTOMATED COUNT: 13.3 % (ref 11.5–14.9)
GFR SERPL CREATININE-BSD FRML MDRD: 59 ML/MIN/1.73M2
GLUCOSE SERPL-MCNC: 126 MG/DL (ref 70–99)
HCT VFR BLD AUTO: 42.7 % (ref 41–53)
HGB BLD-MCNC: 13.4 G/DL (ref 13.5–17.5)
LIPASE SERPL-CCNC: 68 U/L (ref 13–60)
LYMPHOCYTES NFR BLD: 1.9 K/UL (ref 1–4.8)
LYMPHOCYTES RELATIVE PERCENT: 22 % (ref 24–44)
MAGNESIUM SERPL-MCNC: 2.2 MG/DL (ref 1.6–2.6)
MCH RBC QN AUTO: 28.9 PG (ref 26–34)
MCHC RBC AUTO-ENTMCNC: 31.3 G/DL (ref 31–37)
MCV RBC AUTO: 92.4 FL (ref 80–100)
MONOCYTES NFR BLD: 0.8 K/UL (ref 0.1–1.3)
MONOCYTES NFR BLD: 9 % (ref 1–7)
NEUTROPHILS NFR BLD: 64 % (ref 36–66)
NEUTS SEG NFR BLD: 5.5 K/UL (ref 1.3–9.1)
PLATELET # BLD AUTO: 184 K/UL (ref 150–450)
PMV BLD AUTO: 8.3 FL (ref 6–12)
POTASSIUM SERPL-SCNC: 4.1 MMOL/L (ref 3.7–5.3)
PROT SERPL-MCNC: 6.6 G/DL (ref 6.4–8.3)
RBC # BLD AUTO: 4.63 M/UL (ref 4.5–5.9)
SODIUM SERPL-SCNC: 142 MMOL/L (ref 135–144)
WBC OTHER # BLD: 8.5 K/UL (ref 3.5–11)

## 2023-08-20 PROCEDURE — 85025 COMPLETE CBC W/AUTO DIFF WBC: CPT

## 2023-08-20 PROCEDURE — 83735 ASSAY OF MAGNESIUM: CPT

## 2023-08-20 PROCEDURE — 80053 COMPREHEN METABOLIC PANEL: CPT

## 2023-08-20 PROCEDURE — 96374 THER/PROPH/DIAG INJ IV PUSH: CPT

## 2023-08-20 PROCEDURE — 6360000002 HC RX W HCPCS: Performed by: EMERGENCY MEDICINE

## 2023-08-20 PROCEDURE — 36415 COLL VENOUS BLD VENIPUNCTURE: CPT

## 2023-08-20 PROCEDURE — 99284 EMERGENCY DEPT VISIT MOD MDM: CPT

## 2023-08-20 PROCEDURE — 6370000000 HC RX 637 (ALT 250 FOR IP): Performed by: EMERGENCY MEDICINE

## 2023-08-20 PROCEDURE — 83690 ASSAY OF LIPASE: CPT

## 2023-08-20 RX ORDER — ONDANSETRON 2 MG/ML
4 INJECTION INTRAMUSCULAR; INTRAVENOUS ONCE
Status: COMPLETED | OUTPATIENT
Start: 2023-08-20 | End: 2023-08-20

## 2023-08-20 RX ORDER — HYDROCODONE BITARTRATE AND ACETAMINOPHEN 5; 325 MG/1; MG/1
1 TABLET ORAL EVERY 8 HOURS PRN
Qty: 12 TABLET | Refills: 0 | Status: SHIPPED | OUTPATIENT
Start: 2023-08-20 | End: 2023-08-24

## 2023-08-20 RX ORDER — HYDROCODONE BITARTRATE AND ACETAMINOPHEN 5; 325 MG/1; MG/1
1 TABLET ORAL ONCE
Status: COMPLETED | OUTPATIENT
Start: 2023-08-20 | End: 2023-08-20

## 2023-08-20 RX ADMIN — ONDANSETRON 4 MG: 2 INJECTION INTRAMUSCULAR; INTRAVENOUS at 12:01

## 2023-08-20 RX ADMIN — HYDROCODONE BITARTRATE AND ACETAMINOPHEN 1 TABLET: 5; 325 TABLET ORAL at 11:56

## 2023-08-20 ASSESSMENT — PAIN - FUNCTIONAL ASSESSMENT: PAIN_FUNCTIONAL_ASSESSMENT: 0-10

## 2023-08-20 ASSESSMENT — ENCOUNTER SYMPTOMS
COLOR CHANGE: 0
EYE PAIN: 0
ABDOMINAL PAIN: 0
BACK PAIN: 0
SHORTNESS OF BREATH: 0

## 2023-08-20 ASSESSMENT — PAIN SCALES - GENERAL
PAINLEVEL_OUTOF10: 6
PAINLEVEL_OUTOF10: 5

## 2023-08-20 ASSESSMENT — PAIN DESCRIPTION - LOCATION: LOCATION: FLANK

## 2023-08-20 NOTE — ED TRIAGE NOTES
Mode of arrival (squad #, walk in, police, etc) : [facility]    Chief complaint(s): flank pain    Arrival Note (brief scenario, treatment PTA, etc). : Pt arrives to ED c/o right-sided rear flank pain. Was seen yesterday for same, but states pain is uncontrolled. C= \"Have you ever felt that you should Cut down on your drinking? \"  No  A= \"Have people Annoyed you by criticizing your drinking? \"  No  G= \"Have you ever felt bad or Guilty about your drinking? \"  No  E= \"Have you ever had a drink as an Eye-opener first thing in the morning to steady your nerves or to help a hangover? \"  No      Deferred []      Reason for deferring: N/A    *If yes to two or more: probable alcohol abuse. *

## 2023-08-20 NOTE — ED PROVIDER NOTES
EMERGENCY DEPARTMENT ENCOUNTER    Pt Name: Michelle Murray  MRN: [id number]  [address] 1935  Date of evaluation: 8/20/23  CHIEF COMPLAINT       Chief Complaint   Patient presents with    Flank Pain     Left-sided rear flank pain - seen yesterday for pain, but sent home only with Tylenol for pain and pain is too much     HISTORY OF PRESENT ILLNESS   51-year-old male presents for complaints of right-sided flank pain. Patient reports been having worsening right flank pain for the last couple days. Was seen at the hospital yesterday and diagnosed with pyelonephritis. Reports that pain got worse again this morning and he presented again for evaluation. He denies any chest pain or shortness of breath. Denies any nausea or vomiting fevers or chills. He denies any difficulty urinating or pain with urination denies any blood in his urine that he has noted. The history is provided by the patient. REVIEW OF SYSTEMS     Review of Systems   Constitutional:  Negative for chills and fever. HENT:  Negative for congestion and ear pain. Eyes:  Negative for pain. Respiratory:  Negative for shortness of breath. Cardiovascular:  Negative for chest pain, palpitations and leg swelling. Gastrointestinal:  Negative for abdominal pain. Genitourinary:  Positive for flank pain. Negative for dysuria. Musculoskeletal:  Negative for back pain. Skin:  Negative for color change. Neurological:  Negative for numbness and headaches. Psychiatric/Behavioral:  Negative for confusion. All other systems reviewed and are negative.   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

## 2023-08-21 LAB
EKG ATRIAL RATE: 59 BPM
EKG Q-T INTERVAL: 408 MS
EKG QRS DURATION: 90 MS
EKG QTC CALCULATION (BAZETT): 410 MS
EKG R AXIS: -11 DEGREES
EKG T AXIS: 26 DEGREES
EKG VENTRICULAR RATE: 61 BPM

## 2023-08-30 ENCOUNTER — HOSPITAL ENCOUNTER (OUTPATIENT)
Age: 88
Setting detail: SPECIMEN
Discharge: HOME | End: 2023-08-30
Payer: MEDICARE

## 2023-08-30 LAB — PSA SERPL-MCNC: 4.43 NG/ML

## 2023-08-30 PROCEDURE — 84153 ASSAY OF PSA TOTAL: CPT

## 2023-08-30 PROCEDURE — 36415 COLL VENOUS BLD VENIPUNCTURE: CPT

## 2023-08-30 PROCEDURE — P9603 ONE-WAY ALLOW PRORATED MILES: HCPCS

## 2023-11-01 ENCOUNTER — HOSPITAL ENCOUNTER (OUTPATIENT)
Age: 88
Setting detail: SPECIMEN
Discharge: HOME | End: 2023-11-01
Payer: MEDICARE

## 2023-11-01 LAB
ALBUMIN SERPL-MCNC: 3.7 G/DL (ref 3.5–5.2)
ALBUMIN/GLOB SERPL: 1.4 {RATIO} (ref 1–2.5)
ALP SERPL-CCNC: 69 U/L (ref 40–129)
ALT SERPL-CCNC: 15 U/L (ref 5–41)
ANION GAP SERPL CALCULATED.3IONS-SCNC: 9 MMOL/L (ref 9–17)
AST SERPL-CCNC: 22 U/L
BILIRUB DIRECT SERPL-MCNC: 0.1 MG/DL
BILIRUB INDIRECT SERPL-MCNC: 0.3 MG/DL (ref 0–1)
BILIRUB SERPL-MCNC: 0.4 MG/DL (ref 0.3–1.2)
BUN SERPL-MCNC: 23 MG/DL (ref 8–23)
CALCIUM SERPL-MCNC: 8.7 MG/DL (ref 8.6–10.4)
CHLORIDE SERPL-SCNC: 101 MMOL/L (ref 98–107)
CHOLEST SERPL-MCNC: 113 MG/DL
CHOLESTEROL/HDL RATIO: 1.9
CO2 SERPL-SCNC: 31 MMOL/L (ref 20–31)
CREAT SERPL-MCNC: 1 MG/DL (ref 0.7–1.2)
ERYTHROCYTE [DISTWIDTH] IN BLOOD BY AUTOMATED COUNT: 12.8 % (ref 11.8–14.4)
EST. AVERAGE GLUCOSE BLD GHB EST-MCNC: 111 MG/DL
GFR SERPL CREATININE-BSD FRML MDRD: >60 ML/MIN/1.73M2
GLUCOSE SERPL-MCNC: 91 MG/DL (ref 70–99)
HBA1C MFR BLD: 5.5 % (ref 4–6)
HCT VFR BLD AUTO: 44.7 % (ref 40.7–50.3)
HDLC SERPL-MCNC: 59 MG/DL
HGB BLD-MCNC: 14.1 G/DL (ref 13–17)
LDLC SERPL CALC-MCNC: 46 MG/DL (ref 0–130)
MCH RBC QN AUTO: 30.1 PG (ref 25.2–33.5)
MCHC RBC AUTO-ENTMCNC: 31.5 G/DL (ref 28.4–34.8)
MCV RBC AUTO: 95.3 FL (ref 82.6–102.9)
NRBC BLD-RTO: 0 PER 100 WBC
PLATELET # BLD AUTO: 147 K/UL (ref 138–453)
PMV BLD AUTO: 10.8 FL (ref 8.1–13.5)
POTASSIUM SERPL-SCNC: 4 MMOL/L (ref 3.7–5.3)
PROT SERPL-MCNC: 6.4 G/DL (ref 6.4–8.3)
RBC # BLD AUTO: 4.69 M/UL (ref 4.21–5.77)
SODIUM SERPL-SCNC: 141 MMOL/L (ref 135–144)
TRIGL SERPL-MCNC: 40 MG/DL
TSH SERPL DL<=0.05 MIU/L-ACNC: 2.2 UIU/ML (ref 0.3–5)
WBC OTHER # BLD: 6 K/UL (ref 3.5–11.3)

## 2023-11-01 PROCEDURE — 80048 BASIC METABOLIC PNL TOTAL CA: CPT

## 2023-11-01 PROCEDURE — 85027 COMPLETE CBC AUTOMATED: CPT

## 2023-11-01 PROCEDURE — 80061 LIPID PANEL: CPT

## 2023-11-01 PROCEDURE — P9603 ONE-WAY ALLOW PRORATED MILES: HCPCS

## 2023-11-01 PROCEDURE — 83036 HEMOGLOBIN GLYCOSYLATED A1C: CPT

## 2023-11-01 PROCEDURE — 80076 HEPATIC FUNCTION PANEL: CPT

## 2023-11-01 PROCEDURE — 84443 ASSAY THYROID STIM HORMONE: CPT

## 2023-11-01 PROCEDURE — 36415 COLL VENOUS BLD VENIPUNCTURE: CPT

## 2024-03-08 ENCOUNTER — HOSPITAL ENCOUNTER (INPATIENT)
Age: 89
LOS: 8 days | Discharge: OTHER INSTITUTION | DRG: 286 | End: 2024-03-16
Attending: EMERGENCY MEDICINE
Payer: MEDICARE

## 2024-03-08 ENCOUNTER — APPOINTMENT (OUTPATIENT)
Dept: GENERAL RADIOLOGY | Age: 89
DRG: 286 | End: 2024-03-08
Payer: MEDICARE

## 2024-03-08 DIAGNOSIS — I25.5 ISCHEMIC CARDIOMYOPATHY: ICD-10-CM

## 2024-03-08 DIAGNOSIS — I50.9 CONGESTIVE HEART FAILURE (CHF) (HCC): ICD-10-CM

## 2024-03-08 DIAGNOSIS — M79.89 LEG SWELLING: Primary | ICD-10-CM

## 2024-03-08 DIAGNOSIS — R06.02 SOB (SHORTNESS OF BREATH): ICD-10-CM

## 2024-03-08 DIAGNOSIS — I50.9 NEW ONSET OF CONGESTIVE HEART FAILURE (HCC): ICD-10-CM

## 2024-03-08 DIAGNOSIS — I25.10 CORONARY ARTERY DISEASE INVOLVING NATIVE HEART WITHOUT ANGINA PECTORIS, UNSPECIFIED VESSEL OR LESION TYPE: ICD-10-CM

## 2024-03-08 DIAGNOSIS — I25.10 CORONARY ARTERY DISEASE: ICD-10-CM

## 2024-03-08 DIAGNOSIS — J18.9 PNEUMONIA OF RIGHT LOWER LOBE DUE TO INFECTIOUS ORGANISM: ICD-10-CM

## 2024-03-08 LAB
ALBUMIN SERPL-MCNC: 4.1 G/DL (ref 3.5–5.2)
ALBUMIN/GLOB SERPL: 2 {RATIO} (ref 1–2.5)
ALP SERPL-CCNC: 69 U/L (ref 40–129)
ALT SERPL-CCNC: 11 U/L (ref 10–50)
ANION GAP SERPL CALCULATED.3IONS-SCNC: 9 MMOL/L (ref 9–16)
AST SERPL-CCNC: 26 U/L (ref 10–50)
BASOPHILS # BLD: 0.03 K/UL (ref 0–0.2)
BASOPHILS NFR BLD: 0 % (ref 0–2)
BILIRUB DIRECT SERPL-MCNC: <0.2 MG/DL (ref 0–0.3)
BILIRUB INDIRECT SERPL-MCNC: 0.3 MG/DL (ref 0–1)
BILIRUB SERPL-MCNC: 0.5 MG/DL (ref 0–1.2)
BNP SERPL-MCNC: 799 PG/ML (ref 0–300)
BUN SERPL-MCNC: 25 MG/DL (ref 8–23)
CALCIUM SERPL-MCNC: 9 MG/DL (ref 8.6–10.4)
CHLORIDE SERPL-SCNC: 97 MMOL/L (ref 98–107)
CO2 SERPL-SCNC: 29 MMOL/L (ref 20–31)
CREAT SERPL-MCNC: 1.1 MG/DL (ref 0.7–1.2)
EOSINOPHIL # BLD: 0.27 K/UL (ref 0–0.44)
EOSINOPHILS RELATIVE PERCENT: 4 % (ref 1–4)
ERYTHROCYTE [DISTWIDTH] IN BLOOD BY AUTOMATED COUNT: 13.1 % (ref 11.8–14.4)
GFR SERPL CREATININE-BSD FRML MDRD: >60 ML/MIN/1.73M2
GLOBULIN SER CALC-MCNC: 2.7 G/DL
GLUCOSE SERPL-MCNC: 93 MG/DL (ref 74–99)
HCT VFR BLD AUTO: 41.6 % (ref 40.7–50.3)
HGB BLD-MCNC: 12.8 G/DL (ref 13–17)
IMM GRANULOCYTES # BLD AUTO: <0.03 K/UL (ref 0–0.3)
IMM GRANULOCYTES NFR BLD: 0 %
LYMPHOCYTES NFR BLD: 1.82 K/UL (ref 1.1–3.7)
LYMPHOCYTES RELATIVE PERCENT: 26 % (ref 24–43)
MCH RBC QN AUTO: 29.9 PG (ref 25.2–33.5)
MCHC RBC AUTO-ENTMCNC: 30.8 G/DL (ref 28.4–34.8)
MCV RBC AUTO: 97.2 FL (ref 82.6–102.9)
MONOCYTES NFR BLD: 0.72 K/UL (ref 0.1–1.2)
MONOCYTES NFR BLD: 10 % (ref 3–12)
NEUTROPHILS NFR BLD: 60 % (ref 36–65)
NEUTS SEG NFR BLD: 4.26 K/UL (ref 1.5–8.1)
NRBC BLD-RTO: 0 PER 100 WBC
PLATELET # BLD AUTO: 176 K/UL (ref 138–453)
PMV BLD AUTO: 9.8 FL (ref 8.1–13.5)
POTASSIUM SERPL-SCNC: 4.5 MMOL/L (ref 3.7–5.3)
PROCALCITONIN SERPL-MCNC: 0.06 NG/ML (ref 0–0.09)
PROT SERPL-MCNC: 6.8 G/DL (ref 6.6–8.7)
RBC # BLD AUTO: 4.28 M/UL (ref 4.21–5.77)
SODIUM SERPL-SCNC: 135 MMOL/L (ref 136–145)
TROPONIN I SERPL HS-MCNC: 42 NG/L (ref 0–22)
TROPONIN I SERPL HS-MCNC: 44 NG/L (ref 0–22)
TROPONIN I SERPL HS-MCNC: 47 NG/L (ref 0–22)
WBC OTHER # BLD: 7.1 K/UL (ref 3.5–11.3)

## 2024-03-08 PROCEDURE — 71046 X-RAY EXAM CHEST 2 VIEWS: CPT

## 2024-03-08 PROCEDURE — 80076 HEPATIC FUNCTION PANEL: CPT

## 2024-03-08 PROCEDURE — 80048 BASIC METABOLIC PNL TOTAL CA: CPT

## 2024-03-08 PROCEDURE — 84145 PROCALCITONIN (PCT): CPT

## 2024-03-08 PROCEDURE — 84484 ASSAY OF TROPONIN QUANT: CPT

## 2024-03-08 PROCEDURE — 83880 ASSAY OF NATRIURETIC PEPTIDE: CPT

## 2024-03-08 PROCEDURE — 6370000000 HC RX 637 (ALT 250 FOR IP): Performed by: NURSE PRACTITIONER

## 2024-03-08 PROCEDURE — 99223 1ST HOSP IP/OBS HIGH 75: CPT | Performed by: STUDENT IN AN ORGANIZED HEALTH CARE EDUCATION/TRAINING PROGRAM

## 2024-03-08 PROCEDURE — 93005 ELECTROCARDIOGRAM TRACING: CPT | Performed by: STUDENT IN AN ORGANIZED HEALTH CARE EDUCATION/TRAINING PROGRAM

## 2024-03-08 PROCEDURE — 6360000002 HC RX W HCPCS: Performed by: NURSE PRACTITIONER

## 2024-03-08 PROCEDURE — 99285 EMERGENCY DEPT VISIT HI MDM: CPT

## 2024-03-08 PROCEDURE — 2580000003 HC RX 258: Performed by: NURSE PRACTITIONER

## 2024-03-08 PROCEDURE — 85025 COMPLETE CBC W/AUTO DIFF WBC: CPT

## 2024-03-08 PROCEDURE — 2580000003 HC RX 258: Performed by: STUDENT IN AN ORGANIZED HEALTH CARE EDUCATION/TRAINING PROGRAM

## 2024-03-08 PROCEDURE — 6360000002 HC RX W HCPCS: Performed by: STUDENT IN AN ORGANIZED HEALTH CARE EDUCATION/TRAINING PROGRAM

## 2024-03-08 PROCEDURE — 2060000000 HC ICU INTERMEDIATE R&B

## 2024-03-08 RX ORDER — POLYETHYLENE GLYCOL 3350 17 G/17G
17 POWDER, FOR SOLUTION ORAL DAILY PRN
Status: DISCONTINUED | OUTPATIENT
Start: 2024-03-08 | End: 2024-03-16 | Stop reason: HOSPADM

## 2024-03-08 RX ORDER — FUROSEMIDE 10 MG/ML
40 INJECTION INTRAMUSCULAR; INTRAVENOUS 2 TIMES DAILY
Status: DISCONTINUED | OUTPATIENT
Start: 2024-03-08 | End: 2024-03-11

## 2024-03-08 RX ORDER — ENOXAPARIN SODIUM 100 MG/ML
40 INJECTION SUBCUTANEOUS DAILY
Status: DISCONTINUED | OUTPATIENT
Start: 2024-03-08 | End: 2024-03-11

## 2024-03-08 RX ORDER — CALCIUM CARBONATE/VITAMIN D3 600 MG-10
TABLET ORAL
Status: ON HOLD | COMMUNITY
Start: 2024-01-01 | End: 2024-03-16 | Stop reason: HOSPADM

## 2024-03-08 RX ORDER — ACETAMINOPHEN 325 MG/1
650 TABLET ORAL EVERY 6 HOURS PRN
Status: DISCONTINUED | OUTPATIENT
Start: 2024-03-08 | End: 2024-03-15 | Stop reason: SDUPTHER

## 2024-03-08 RX ORDER — ALBUTEROL SULFATE 2.5 MG/3ML
2.5 SOLUTION RESPIRATORY (INHALATION) EVERY 6 HOURS PRN
Status: DISCONTINUED | OUTPATIENT
Start: 2024-03-08 | End: 2024-03-16 | Stop reason: HOSPADM

## 2024-03-08 RX ORDER — CARVEDILOL 12.5 MG/1
12.5 TABLET ORAL 2 TIMES DAILY
Status: DISCONTINUED | OUTPATIENT
Start: 2024-03-08 | End: 2024-03-12

## 2024-03-08 RX ORDER — LISINOPRIL 5 MG/1
TABLET ORAL
Status: ON HOLD | COMMUNITY
Start: 2024-03-01 | End: 2024-03-16 | Stop reason: HOSPADM

## 2024-03-08 RX ORDER — SODIUM CHLORIDE 9 MG/ML
INJECTION, SOLUTION INTRAVENOUS PRN
Status: DISCONTINUED | OUTPATIENT
Start: 2024-03-08 | End: 2024-03-16 | Stop reason: HOSPADM

## 2024-03-08 RX ORDER — ONDANSETRON 4 MG/1
4 TABLET, ORALLY DISINTEGRATING ORAL EVERY 8 HOURS PRN
Status: DISCONTINUED | OUTPATIENT
Start: 2024-03-08 | End: 2024-03-16 | Stop reason: HOSPADM

## 2024-03-08 RX ORDER — TAMSULOSIN HYDROCHLORIDE 0.4 MG/1
0.4 CAPSULE ORAL DAILY
Status: DISCONTINUED | OUTPATIENT
Start: 2024-03-08 | End: 2024-03-16 | Stop reason: HOSPADM

## 2024-03-08 RX ORDER — CARVEDILOL 3.12 MG/1
TABLET ORAL
Status: ON HOLD | COMMUNITY
Start: 2024-03-01 | End: 2024-03-16 | Stop reason: HOSPADM

## 2024-03-08 RX ORDER — FUROSEMIDE 10 MG/ML
20 INJECTION INTRAMUSCULAR; INTRAVENOUS ONCE
Status: COMPLETED | OUTPATIENT
Start: 2024-03-08 | End: 2024-03-08

## 2024-03-08 RX ORDER — ASPIRIN 81 MG/1
81 TABLET ORAL DAILY
Status: DISCONTINUED | OUTPATIENT
Start: 2024-03-08 | End: 2024-03-16 | Stop reason: HOSPADM

## 2024-03-08 RX ORDER — LISINOPRIL 5 MG/1
5 TABLET ORAL DAILY
Status: DISCONTINUED | OUTPATIENT
Start: 2024-03-09 | End: 2024-03-09

## 2024-03-08 RX ORDER — MAGNESIUM SULFATE IN WATER 40 MG/ML
2000 INJECTION, SOLUTION INTRAVENOUS PRN
Status: DISCONTINUED | OUTPATIENT
Start: 2024-03-08 | End: 2024-03-16 | Stop reason: HOSPADM

## 2024-03-08 RX ORDER — SODIUM CHLORIDE 0.9 % (FLUSH) 0.9 %
10 SYRINGE (ML) INJECTION PRN
Status: DISCONTINUED | OUTPATIENT
Start: 2024-03-08 | End: 2024-03-16 | Stop reason: HOSPADM

## 2024-03-08 RX ORDER — MIRTAZAPINE 7.5 MG/1
7.5 TABLET, FILM COATED ORAL NIGHTLY
COMMUNITY

## 2024-03-08 RX ORDER — ONDANSETRON 2 MG/ML
4 INJECTION INTRAMUSCULAR; INTRAVENOUS EVERY 6 HOURS PRN
Status: DISCONTINUED | OUTPATIENT
Start: 2024-03-08 | End: 2024-03-16 | Stop reason: HOSPADM

## 2024-03-08 RX ORDER — MULTIVITAMIN WITH FOLIC ACID 400 MCG
TABLET ORAL
COMMUNITY
Start: 2024-03-01

## 2024-03-08 RX ORDER — SODIUM CHLORIDE 0.9 % (FLUSH) 0.9 %
5-40 SYRINGE (ML) INJECTION EVERY 12 HOURS SCHEDULED
Status: DISCONTINUED | OUTPATIENT
Start: 2024-03-08 | End: 2024-03-16 | Stop reason: HOSPADM

## 2024-03-08 RX ORDER — VENLAFAXINE 25 MG/1
TABLET ORAL
COMMUNITY

## 2024-03-08 RX ORDER — POTASSIUM CHLORIDE 20 MEQ/1
40 TABLET, EXTENDED RELEASE ORAL PRN
Status: DISCONTINUED | OUTPATIENT
Start: 2024-03-08 | End: 2024-03-16 | Stop reason: HOSPADM

## 2024-03-08 RX ORDER — ACETAMINOPHEN 650 MG/1
650 SUPPOSITORY RECTAL EVERY 6 HOURS PRN
Status: DISCONTINUED | OUTPATIENT
Start: 2024-03-08 | End: 2024-03-16 | Stop reason: HOSPADM

## 2024-03-08 RX ORDER — POTASSIUM CHLORIDE 7.45 MG/ML
10 INJECTION INTRAVENOUS PRN
Status: DISCONTINUED | OUTPATIENT
Start: 2024-03-08 | End: 2024-03-16 | Stop reason: HOSPADM

## 2024-03-08 RX ADMIN — ENOXAPARIN SODIUM 40 MG: 100 INJECTION SUBCUTANEOUS at 18:41

## 2024-03-08 RX ADMIN — AZITHROMYCIN MONOHYDRATE 500 MG: 500 INJECTION, POWDER, LYOPHILIZED, FOR SOLUTION INTRAVENOUS at 16:37

## 2024-03-08 RX ADMIN — FUROSEMIDE 40 MG: 10 INJECTION, SOLUTION INTRAMUSCULAR; INTRAVENOUS at 18:42

## 2024-03-08 RX ADMIN — FUROSEMIDE 20 MG: 10 INJECTION, SOLUTION INTRAMUSCULAR; INTRAVENOUS at 15:00

## 2024-03-08 RX ADMIN — ACETAMINOPHEN 650 MG: 325 TABLET ORAL at 20:03

## 2024-03-08 RX ADMIN — CARVEDILOL 12.5 MG: 12.5 TABLET, FILM COATED ORAL at 21:30

## 2024-03-08 RX ADMIN — TAMSULOSIN HYDROCHLORIDE 0.4 MG: 0.4 CAPSULE ORAL at 18:52

## 2024-03-08 RX ADMIN — SODIUM CHLORIDE, PRESERVATIVE FREE 10 ML: 5 INJECTION INTRAVENOUS at 21:30

## 2024-03-08 RX ADMIN — CEFTRIAXONE SODIUM 1000 MG: 1 INJECTION, POWDER, FOR SOLUTION INTRAMUSCULAR; INTRAVENOUS at 15:00

## 2024-03-08 ASSESSMENT — PAIN SCALES - GENERAL
PAINLEVEL_OUTOF10: 0
PAINLEVEL_OUTOF10: 5

## 2024-03-08 NOTE — ED PROVIDER NOTES
[facility] ED  Emergency Department Encounter  Emergency Medicine Resident     Pt Name:Michael Mcghee  MRN: [id number]  Birthdate 1935  Date of evaluation: 3/8/24  PCP:  Shaun Peralta DO  Note Started: 11:07 AM EST      CHIEF COMPLAINT       Chief Complaint   Patient presents with    Shortness of Breath     \"For months\"    Leg Swelling     bilateral       HISTORY OF PRESENT ILLNESS  (Location/Symptom, Timing/Onset, Context/Setting, Quality, Duration, Modifying Factors, Severity.)      Michael Mcghee is a 88 y.o. male who presents with bilateral leg swelling as well as shortness of breath.  Presents with niece who provides majority of history.  Patient coming in from nursing facility.  Symptoms ongoing for reportedly months.  Niece has been concerned the patient has not been being taking care of while at his facility.  Patient has no known history of congestive heart failure, takes no water pills.  Patient is nonambulatory at baseline.  Denies any chest pain.  No fevers or chills.  No cough or congestion.  Denies having issues with this in the past.    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       has a past surgical history that includes Ankle surgery (Bilateral); Cardiac surgery; Cardiac surgery; joint  8.6-50 MG tablet Take 1 tablet by mouth daily    Evangelista Buck MD   magnesium hydroxide (MILK OF MAGNESIA) 400 MG/5ML suspension Take 30 mLs by mouth 2 times daily as needed for Constipation    ProviderEvangelista MD   senna (SM SENNA LAXATIVE) 8.6 MG tablet Take 2 tablets by mouth nightly    Evangelista Buck MD   solifenacin (VESICARE) 10 MG tablet Take 0.5 tablets by mouth daily    Evangelista Buck MD   levETIRAcetam (KEPPRA) 500 MG tablet Take 500 mg by mouth 2 times daily  Patient not taking: Reported on 8/20/2023    Evangelista Buck MD   carvedilol (COREG) 12.5 MG tablet  3/17/16   Evangelista Buck MD   Handicap Placard MISC by Does not apply route Expires in 5 years. 4/28/16   Shaun Peralta DO   traMADol (ULTRAM) 50 MG tablet Take 1 tablet by mouth every 6 hours as needed  Patient not taking: Reported on 8/20/2023 4/15/16   Vannessa Mckinney, ROD   rosuvastatin (CRESTOR) 10 MG tablet Take 1 tablet by mouth daily    Evangelista Buck MD   aspirin 81 MG tablet Take 1 tablet by mouth daily    Evangelista Buck MD   nitroGLYCERIN (NITROSTAT) 0.4 MG SL tablet Place 1 tablet under the tongue every 5 minutes as needed    Evangelista Buck MD   tamsulosin (FLOMAX) 0.4 MG capsule Take 1 capsule by mouth daily    ProviderEvangelista MD       REVIEW OF SYSTEMS       Review of Systems   Constitutional:  Negative for chills and fever.   Respiratory:  Positive for shortness of breath.    Cardiovascular:  Positive for leg swelling. Negative for chest pain.   Gastrointestinal:  Negative for abdominal pain.   Neurological:  Negative for headaches.       PHYSICAL EXAM      INITIAL VITALS:   BP (!) 174/81   Pulse 85   Temp 97 °F (36.1 °C) (Oral)   Resp 27   Wt 81.6 kg (179 lb 14.3 oz)   SpO2 94%   BMI 25.81 kg/m²     Physical Exam  Vitals reviewed.   Constitutional:       General: He is not in acute distress.     Appearance: Normal appearance. He is ill-appearing

## 2024-03-08 NOTE — ED NOTES
Pt family continuously chasing  writer around ED for update regarding room status, or to have tests completed such as ECHO. Family also requesting that writer call room placement. Family continuously told that room placement and procedures are out of RNs hands. RN supervisor notified.

## 2024-03-08 NOTE — H&P
[facility]  Office: [phone]  Haris Bledsoe DO, Dudley Reece DO, Dennys Church DO, Jerad Felder DO, Ade Putnam MD, Thi Prather MD, Refugio Hendrickson MD, Perla Schaeffer MD,  Devan Sauer MD, Blanka Knox MD, Rosalinda Mitchell MD,  Rosalind Cabello DO, Lorene Barrett MD, Iain Garcia MD, Stalin Bledsoe DO, Darlene Woodall MD,  Tremayne Acevedo DO, Karen Vidal MD, Tina Vivar MD, Krystal Harding MD, Ileana Soriano MD,  Dereck Hernandez MD, Elvia Osullivan MD, Remigio Jaimes MD, Nathaniel Awan MD, Casey Lay MD, Omero Keys MD, Robin Landin DO, Real Alanis DO, Marianela Taylor MD,  Kane Oswald MD, Shirley Waterhouse, CNP,  Abby Meadows, CNP, Camden Rehman, CNP,  Christi Ellis, JUAN MANUEL, Christine Conn, CNP, Loraine Suarez, CNP, Demetria Pham CNP, Nicki Darby, CNP, Elizabeth Christopher, CNP, Trini Garcia, PA-C, Christin Brownlee PA-C, Marli Guzman, CNP, Velvet Olivares, CNP, Ayse Layton, CNP, Tara He, CNS, Kori Claudoi, CNP, Kimberly Hassan, CNP, Tracy Schwab, CNP         [facility]   IN-PATIENT SERVICE   [facility]    HISTORY AND PHYSICAL EXAMINATION            Date:   3/8/2024  Patient name:  Michael Mcghee  Date of admission:  3/8/2024 11:05 AM  MRN:   [id number]  Account:  [id number]  YOB: 1935  PCP:    Shaun Peralta DO  Room:   34/  Code Status:    No Order    Chief Complaint:     Chief Complaint   Patient presents with    Shortness of Breath     \"For months\"    Leg Swelling     bilateral       History Obtained From:     patient, electronic medical record    History of Present Illness:     Michael Mcghee is a 88 y.o. male with PMHx of CAD s/p CABG in 1990, HTN, BPH, HLD and Hx of left knee replacement who presents with Shortness of Breath (\"For months\") and worsening leg Swelling (bilateral).  Patient is admitted to the hospital for the management of CHF with unknown LVEF (HCC).  Patient lives in an assisted living  facility and has been having increasing shortness of breath and bilateral leg swelling that have worsened over the past few days.  Patient has been unable to lay flat completely flat in bed and has been sleeping in bedside chair.  Patient's niece is in the room and states he has also been having increased wheezing.  Patient denies having any chest pain, lightheadedness, dizziness, fever, chills, nausea or vomiting    In the ED, patient slightly tachypneic and hypertensive at 162/68.  Patient resting on room air, saturating 95%.  CMP and CBC were largely unremarkable.  Patient WBC WNL.  proBNP was elevated at 799, no prior to compare with.  Initial troponin was 47, repeat was 44.  CXR showed bibasilar atelectasis with slightly more focal opacity in the right lower lobe lung base possibly representing consolidation from pneumonia.  Cardiomegaly with evidence of prior CABG.  No overt pulmonary edema.  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 (HCC) 12/23/2008    Benign hypertensive heart disease without congestive heart failure 3/19/2007    CAD (coronary artery disease)     Chronic ischemic heart disease 11/27/2007    Hyperlipidemia 7/27/2020    Hypertension     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) 7/27/2020    Mitral valve disease 7/27/2020    Obesity 7/27/2020    Presence of aortocoronary bypass graft 3/19/2007    Presence of coronary angioplasty implant and graft 7/10/2006    Pure hypercholesterolemia 3/19/2007    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

## 2024-03-08 NOTE — ED NOTES
Pt presents to ED via wheelchair through triage c/o shortness of breath for months and bilateral leg swelling. Pt reports living at nursing facility. Pt leg swelling swelling is bilateral and pitting. Pt denies any chest pain or nausea/vomiting. Pt reports \"cannot breath when laying flat.\"  Pt has audible wheezing upon arrival.  Pt reports compliant with all medications.  Pt reports wheelchair baseline. Aox4.

## 2024-03-08 NOTE — ED PROVIDER NOTES
[facility]     Emergency Department     Faculty Attestation    I performed a history and physical examination of the patient and discussed management with the resident. I reviewed the resident’s note and agree with the documented findings and plan of care. Any areas of disagreement are noted on the chart. I was personally present for the key portions of any procedures. I have documented in the chart those procedures where I was not present during the key portions. I have reviewed the emergency nurses triage note. I agree with the chief complaint, past medical history, past surgical history, allergies, medications, social and family history as documented unless otherwise noted below.   For Physician Assistant/ Nurse Practitioner cases/documentation I have personally evaluated this patient and have completed at least one if not all key elements of the E/M (history, physical exam, and MDM). Additional findings are as noted.      Primary Care Physician:  Shaun Peralta DO    CHIEF COMPLAINT       Chief Complaint   Patient presents with    Shortness of Breath     \"For months\"    Leg Swelling     bilateral       RECENT VITALS:   Temp: 97 °F (36.1 °C),  Pulse: 67, Respirations: (!) 32, BP: (!) 162/68    LABS:  Labs Reviewed   CBC WITH AUTO DIFFERENTIAL - Abnormal; Notable for the following components:       Result Value    Hemoglobin 12.8 (*)     All other components within normal limits   BASIC METABOLIC PANEL - Abnormal; Notable for the following components:    Sodium 135 (*)     Chloride 97 (*)     BUN 25 (*)     All other components within normal limits   TROPONIN - Abnormal; Notable for the following components:    Troponin, High Sensitivity 47 (*)     All other components within normal limits   BRAIN NATRIURETIC PEPTIDE - Abnormal; Notable for the following components:    Pro- (*)     All other components within normal limits   HEPATIC FUNCTION PANEL   TROPONIN         PERTINENT

## 2024-03-08 NOTE — ED NOTES
600-10 MG-MCG TABS PER TAB        CARVEDILOL (COREG) 12.5 MG TABLET        CARVEDILOL (COREG) 3.125 MG TABLET        CEFTRIAXONE SODIUM 2 G SOLR    Infuse 2 g intravenously every 24 hours    HANDICAP PLACARD MISC    by Does not apply route Expires in 5 years.    LEVETIRACETAM (KEPPRA) 500 MG TABLET    Take 500 mg by mouth 2 times daily    LISINOPRIL (PRINIVIL;ZESTRIL) 5 MG TABLET        MAGNESIUM HYDROXIDE (MILK OF MAGNESIA) 400 MG/5ML SUSPENSION    Take 30 mLs by mouth 2 times daily as needed for Constipation    MIRTAZAPINE (REMERON) 7.5 MG TABLET    Take 1 tablet by mouth nightly    MULTIPLE VITAMIN (MULTIVITAMIN) TABLET        NITROGLYCERIN (NITROSTAT) 0.4 MG SL TABLET    Place 1 tablet under the tongue every 5 minutes as needed    ONDANSETRON (ZOFRAN-ODT) 4 MG DISINTEGRATING TABLET    Take 1 tablet by mouth every 8 hours as needed for Nausea or Vomiting    ROSUVASTATIN (CRESTOR) 10 MG TABLET    Take 1 tablet by mouth daily At bedtime    SENNA (SM SENNA LAXATIVE) 8.6 MG TABLET    Take 2 tablets by mouth nightly    SENNOSIDES-DOCUSATE SODIUM (SENOKOT-S) 8.6-50 MG TABLET    Take 1 tablet by mouth daily    SOLIFENACIN (VESICARE) 10 MG TABLET    Take 0.5 tablets by mouth daily    TAMSULOSIN (FLOMAX) 0.4 MG CAPSULE    Take 1 capsule by mouth daily    TRAMADOL (ULTRAM) 50 MG TABLET    Take 1 tablet by mouth every 6 hours as needed    VENLAFAXINE (EFFEXOR) 25 MG TABLET    1 tablet with food Orally Twice a day for 30 day(s)     Orders Placed This Encounter   Medications    furosemide (LASIX) injection 20 mg    cefTRIAXone (ROCEPHIN) 1,000 mg in sodium chloride 0.9 % 50 mL IVPB (mini-bag)     Order Specific Question:   Antimicrobial Indications     Answer:   Pneumonia (CAP)    azithromycin (ZITHROMAX) 500 mg in sodium chloride 0.9 % 250 mL IVPB ([id number])     Order Specific Question:   Antimicrobial Indications     Answer:   Pneumonia (CAP)    aspirin EC tablet 81 mg    carvedilol (COREG) tablet 12.5 mg    tamsulosin  7/27/2020    Presence of aortocoronary bypass graft 3/19/2007    Presence of coronary angioplasty implant and graft 7/10/2006    Pure hypercholesterolemia 3/19/2007    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) 2/15/2019    SOB (shortness of breath) 7/27/2020    Spinal stenosis, lumbar region, without neurogenic claudication 12/12/2013    Subdural hematoma (HCC) 2/9/2019       Labs:  Labs Reviewed   CBC WITH AUTO DIFFERENTIAL - Abnormal; Notable for the following components:       Result Value    Hemoglobin 12.8 (*)     All other components within normal limits   BASIC METABOLIC PANEL - Abnormal; Notable for the following components:    Sodium 135 (*)     Chloride 97 (*)     BUN 25 (*)     All other components within normal limits   TROPONIN - Abnormal; Notable for the following components:    Troponin, High Sensitivity 47 (*)     All other components within normal limits   BRAIN NATRIURETIC PEPTIDE - Abnormal; Notable for the following components:    Pro- (*)     All other components within normal limits   TROPONIN - Abnormal; Notable for the following components:    Troponin, High Sensitivity 44 (*)     All other components within normal limits   HEPATIC FUNCTION PANEL   TROPONIN   PROCALCITONIN   BASIC METABOLIC PANEL   MAGNESIUM   LIPID PANEL   TSH   BRAIN NATRIURETIC PEPTIDE   CBC WITH AUTO DIFFERENTIAL       Electronically signed by Susan Lowe RN on 3/8/2024 at 6:55 PM

## 2024-03-09 ENCOUNTER — APPOINTMENT (OUTPATIENT)
Dept: GENERAL RADIOLOGY | Age: 89
DRG: 286 | End: 2024-03-09
Payer: MEDICARE

## 2024-03-09 PROBLEM — R79.89 ELEVATED TROPONIN: Status: ACTIVE | Noted: 2024-03-09

## 2024-03-09 LAB
ANION GAP SERPL CALCULATED.3IONS-SCNC: 12 MMOL/L (ref 9–16)
BASOPHILS # BLD: 0.04 K/UL (ref 0–0.2)
BASOPHILS NFR BLD: 1 % (ref 0–2)
BNP SERPL-MCNC: 1244 PG/ML (ref 0–300)
BUN SERPL-MCNC: 23 MG/DL (ref 8–23)
CALCIUM SERPL-MCNC: 9 MG/DL (ref 8.6–10.4)
CHLORIDE SERPL-SCNC: 97 MMOL/L (ref 98–107)
CHOLEST SERPL-MCNC: 111 MG/DL (ref 0–199)
CHOLESTEROL/HDL RATIO: 2
CO2 SERPL-SCNC: 29 MMOL/L (ref 20–31)
CREAT SERPL-MCNC: 1 MG/DL (ref 0.7–1.2)
EKG ATRIAL RATE: 67 BPM
EKG P AXIS: 9 DEGREES
EKG P-R INTERVAL: 210 MS
EKG Q-T INTERVAL: 392 MS
EKG QRS DURATION: 94 MS
EKG QTC CALCULATION (BAZETT): 414 MS
EKG R AXIS: 5 DEGREES
EKG T AXIS: 55 DEGREES
EKG VENTRICULAR RATE: 67 BPM
EOSINOPHIL # BLD: 0.18 K/UL (ref 0–0.44)
EOSINOPHILS RELATIVE PERCENT: 2 % (ref 1–4)
ERYTHROCYTE [DISTWIDTH] IN BLOOD BY AUTOMATED COUNT: 12.8 % (ref 11.8–14.4)
GFR SERPL CREATININE-BSD FRML MDRD: >60 ML/MIN/1.73M2
GLUCOSE SERPL-MCNC: 91 MG/DL (ref 74–99)
HCT VFR BLD AUTO: 42.1 % (ref 40.7–50.3)
HDLC SERPL-MCNC: 56 MG/DL
HGB BLD-MCNC: 13.5 G/DL (ref 13–17)
IMM GRANULOCYTES # BLD AUTO: <0.03 K/UL (ref 0–0.3)
IMM GRANULOCYTES NFR BLD: 0 %
LDLC SERPL CALC-MCNC: 37 MG/DL (ref 0–100)
LYMPHOCYTES NFR BLD: 1.97 K/UL (ref 1.1–3.7)
LYMPHOCYTES RELATIVE PERCENT: 26 % (ref 24–43)
MAGNESIUM SERPL-MCNC: 2 MG/DL (ref 1.6–2.4)
MCH RBC QN AUTO: 30.3 PG (ref 25.2–33.5)
MCHC RBC AUTO-ENTMCNC: 32.1 G/DL (ref 28.4–34.8)
MCV RBC AUTO: 94.6 FL (ref 82.6–102.9)
MONOCYTES NFR BLD: 0.85 K/UL (ref 0.1–1.2)
MONOCYTES NFR BLD: 11 % (ref 3–12)
NEUTROPHILS NFR BLD: 60 % (ref 36–65)
NEUTS SEG NFR BLD: 4.53 K/UL (ref 1.5–8.1)
NRBC BLD-RTO: 0 PER 100 WBC
PLATELET # BLD AUTO: 176 K/UL (ref 138–453)
PMV BLD AUTO: 9.9 FL (ref 8.1–13.5)
POTASSIUM SERPL-SCNC: 3.8 MMOL/L (ref 3.7–5.3)
RBC # BLD AUTO: 4.45 M/UL (ref 4.21–5.77)
SODIUM SERPL-SCNC: 138 MMOL/L (ref 136–145)
TRIGL SERPL-MCNC: 93 MG/DL
TSH SERPL DL<=0.05 MIU/L-ACNC: 1.35 UIU/ML (ref 0.27–4.2)
VLDLC SERPL CALC-MCNC: 19 MG/DL
WBC OTHER # BLD: 7.6 K/UL (ref 3.5–11.3)

## 2024-03-09 PROCEDURE — 80061 LIPID PANEL: CPT

## 2024-03-09 PROCEDURE — 84443 ASSAY THYROID STIM HORMONE: CPT

## 2024-03-09 PROCEDURE — 71046 X-RAY EXAM CHEST 2 VIEWS: CPT

## 2024-03-09 PROCEDURE — 6370000000 HC RX 637 (ALT 250 FOR IP): Performed by: NURSE PRACTITIONER

## 2024-03-09 PROCEDURE — 36415 COLL VENOUS BLD VENIPUNCTURE: CPT

## 2024-03-09 PROCEDURE — 80048 BASIC METABOLIC PNL TOTAL CA: CPT

## 2024-03-09 PROCEDURE — 93010 ELECTROCARDIOGRAM REPORT: CPT | Performed by: INTERNAL MEDICINE

## 2024-03-09 PROCEDURE — 99223 1ST HOSP IP/OBS HIGH 75: CPT | Performed by: INTERNAL MEDICINE

## 2024-03-09 PROCEDURE — 6360000002 HC RX W HCPCS: Performed by: NURSE PRACTITIONER

## 2024-03-09 PROCEDURE — 2580000003 HC RX 258: Performed by: NURSE PRACTITIONER

## 2024-03-09 PROCEDURE — 83735 ASSAY OF MAGNESIUM: CPT

## 2024-03-09 PROCEDURE — 6370000000 HC RX 637 (ALT 250 FOR IP): Performed by: STUDENT IN AN ORGANIZED HEALTH CARE EDUCATION/TRAINING PROGRAM

## 2024-03-09 PROCEDURE — 2060000000 HC ICU INTERMEDIATE R&B

## 2024-03-09 PROCEDURE — 83880 ASSAY OF NATRIURETIC PEPTIDE: CPT

## 2024-03-09 PROCEDURE — 85025 COMPLETE CBC W/AUTO DIFF WBC: CPT

## 2024-03-09 PROCEDURE — 99232 SBSQ HOSP IP/OBS MODERATE 35: CPT | Performed by: NURSE PRACTITIONER

## 2024-03-09 RX ORDER — GUAIFENESIN 600 MG/1
600 TABLET, EXTENDED RELEASE ORAL 2 TIMES DAILY
Status: DISCONTINUED | OUTPATIENT
Start: 2024-03-09 | End: 2024-03-16 | Stop reason: HOSPADM

## 2024-03-09 RX ORDER — FUROSEMIDE 10 MG/ML
40 INJECTION INTRAMUSCULAR; INTRAVENOUS ONCE
Status: COMPLETED | OUTPATIENT
Start: 2024-03-09 | End: 2024-03-09

## 2024-03-09 RX ORDER — LOSARTAN POTASSIUM 50 MG/1
25 TABLET ORAL DAILY
Status: DISCONTINUED | OUTPATIENT
Start: 2024-03-09 | End: 2024-03-16 | Stop reason: HOSPADM

## 2024-03-09 RX ORDER — ROSUVASTATIN CALCIUM 10 MG/1
10 TABLET, COATED ORAL DAILY
Status: DISCONTINUED | OUTPATIENT
Start: 2024-03-09 | End: 2024-03-16 | Stop reason: HOSPADM

## 2024-03-09 RX ADMIN — ACETAMINOPHEN 650 MG: 325 TABLET ORAL at 18:02

## 2024-03-09 RX ADMIN — FUROSEMIDE 40 MG: 10 INJECTION, SOLUTION INTRAMUSCULAR; INTRAVENOUS at 18:03

## 2024-03-09 RX ADMIN — CARVEDILOL 12.5 MG: 12.5 TABLET, FILM COATED ORAL at 20:21

## 2024-03-09 RX ADMIN — FUROSEMIDE 40 MG: 10 INJECTION, SOLUTION INTRAMUSCULAR; INTRAVENOUS at 00:19

## 2024-03-09 RX ADMIN — CARVEDILOL 12.5 MG: 12.5 TABLET, FILM COATED ORAL at 08:07

## 2024-03-09 RX ADMIN — ROSUVASTATIN CALCIUM 10 MG: 10 TABLET, COATED ORAL at 08:08

## 2024-03-09 RX ADMIN — TAMSULOSIN HYDROCHLORIDE 0.4 MG: 0.4 CAPSULE ORAL at 08:07

## 2024-03-09 RX ADMIN — SODIUM CHLORIDE, PRESERVATIVE FREE 10 ML: 5 INJECTION INTRAVENOUS at 20:22

## 2024-03-09 RX ADMIN — SODIUM CHLORIDE, PRESERVATIVE FREE 10 ML: 5 INJECTION INTRAVENOUS at 08:09

## 2024-03-09 RX ADMIN — ASPIRIN 81 MG: 81 TABLET, COATED ORAL at 08:09

## 2024-03-09 RX ADMIN — GUAIFENESIN 600 MG: 600 TABLET, EXTENDED RELEASE ORAL at 20:22

## 2024-03-09 RX ADMIN — FUROSEMIDE 40 MG: 10 INJECTION, SOLUTION INTRAMUSCULAR; INTRAVENOUS at 08:08

## 2024-03-09 RX ADMIN — ENOXAPARIN SODIUM 40 MG: 100 INJECTION SUBCUTANEOUS at 08:07

## 2024-03-09 RX ADMIN — GUAIFENESIN 600 MG: 600 TABLET, EXTENDED RELEASE ORAL at 09:21

## 2024-03-09 RX ADMIN — LOSARTAN POTASSIUM 25 MG: 50 TABLET, FILM COATED ORAL at 08:07

## 2024-03-09 ASSESSMENT — PAIN SCALES - GENERAL: PAINLEVEL_OUTOF10: 8

## 2024-03-09 NOTE — PROGRESS NOTES
[facility]  Office: [phone]  Haris Bledsoe DO, Dudley Reece DO, Dennys Church DO, Jerad Felder DO, Ade Putnam MD, Thi Prather MD, Refugio Hendrickson MD, Perla Schaeffer MD,  Devan Sauer MD, Blanka Knox MD, Rosalinda Mitchell MD,  Rosalind Cabello DO, Lorene Barrett MD, Iain Garcia MD, Stalin Bledsoe DO, Darlene Woodall MD,  Tremayne Acevedo DO, Karen Vidal MD, Tina Vivar MD, Krystal Harding MD, Ileana Soriano MD,  Dereck Hernandez MD, Elvia Osullivan MD, Remigio Jaimes MD, Nathaniel Awan MD, Casey Lay MD, Omero Keys MD, Robin Landin DO, Real Alanis DO, Marianela Taylor MD,  Kane Oswald MD, Shirley Waterhouse, CNP,  Abby Meaodws, CNP, Camden Rehman, CNP,  Christi Ellis, DNP, Christine Conn, CNP, Loraine Suarez, CNP, Demetria Pham CNP, Nicki Darby, CNP, Elizabeth Christopher, CNP, Trini Garcia, PA-C, Christin Brownlee, PA-C, Marli Guzman, CNP, Velvet Olivares, CNP, Ayse Layton, CNP, Tara He, CNS, Kori Claudio, CNP, Kimberly Hassan, CNP, Tracy Schwab, CNP         [facility]   IN-PATIENT SERVICE   [facility]    Progress Note    3/9/2024    11:33 AM    Name:   Michael Mcghee  MRN:     [id number]     Acct:      [id number]   Room:   2007/2007-01  IP Day:  1  Admit Date:  3/8/2024 11:05 AM    PCP:   Shaun Peralta DO  Code Status:  Full Code    Subjective:     C/C:   Chief Complaint   Patient presents with    Shortness of Breath     \"For months\"    Leg Swelling     bilateral     Interval History Status: improved.     Seen and examined at bedside. Denies CP. Patient reports SOB mildly improved but leg edema markedly improved.     VS and labs reviewed. Awaiting echo completion. SR with PVC's on monitor.     Discussed POC with nursing and patient. Patient requesting discharge.     Discussed CODE STATUS. Patient requests to remain full code. He is agreeable to intubation, CPR and defibrillation.     Brief History:     Michael Mchgee is  BID    tamsulosin  0.4 mg Oral Daily    sodium chloride flush  5-40 mL IntraVENous 2 times per day    enoxaparin  40 mg SubCUTAneous Daily    furosemide  40 mg IntraVENous BID     Continuous Infusions:    sodium chloride       PRN Meds: sodium chloride flush, sodium chloride, ondansetron **OR** ondansetron, polyethylene glycol, acetaminophen **OR** acetaminophen, potassium chloride **OR** potassium alternative oral replacement **OR** potassium chloride, magnesium sulfate, albuterol    Data:     Past Medical History:   has a past medical history of Arthropathy, unspecified, other specified sites, 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    Social History:   reports that he has quit smoking. He has never used smokeless tobacco. He reports current alcohol use. He reports that he does not use drugs.     Family History:   Family History   Problem Relation Age of Onset    High Blood Pressure Mother     Heart Disease Mother     Cancer Mother     Heart Disease Father     High Blood Pressure Father        Vitals:  BP (!) 157/72   Pulse 83   Temp 98.4 °F (36.9 °C) (Oral)   Resp 26   Wt 81.6 kg (179 lb 14.3 oz)   SpO2 92%   BMI 25.81 kg/m²   Temp (24hrs), Av.7 °F (36.5 °C), Min:97.2 °F (36.2 °C), Max:98.4 °F (36.9 °C)    No results for input(s): \"POCGLU\" in the last 72 hours.    I/O (24Hr):    Intake/Output Summary (Last 24 hours) at 3/9/2024 5731  Last data filed

## 2024-03-09 NOTE — CARE COORDINATION
Attempted to do interview. Patient eating breakfast and requests CM come back later.    2441 Patient not in room    1610 Spoke to sukumar Yari at length about patient. She relates he recently lost his wife. She says the care he is getting at [facility] is very poor. CM told Yari that CM could help with placement to SNF if patient interested.    Problem: Wound:  Goal: Will show signs of wound healing; wound closure and no evidence of infection  Will show signs of wound healing; wound closure and no evidence of infection   Outcome: Ongoing      Problem: Blood Glucose:  Goal: Ability to maintain appropriate glucose levels will improve  Ability to maintain appropriate glucose levels will improve   Outcome: Ongoing      Problem: Arterial:  Goal: Optimize blood flow for wound healing  Optimize blood flow for wound healing   Outcome: Ongoing

## 2024-03-09 NOTE — PROGRESS NOTES
Pt destating to high 80s, refusing o2 at this time. Pt educated on importance, still refusing. Will reassess later.

## 2024-03-09 NOTE — CONSULTS
[facility]    Consult / H&P               Today's Date: 3/9/2024  Patient Name: Michael Mcghee  Date of admission: 3/8/2024 11:05 AM  Patient's age: 88 y.o., 1935  Admission Dx: SOB (shortness of breath) [R06.02]  Leg swelling [M79.89]  CHF with unknown LVEF (HCC) [I50.9]  Pneumonia of right lower lobe due to infectious organism [J18.9]  New onset of congestive heart failure (HCC) [I50.9]    Requesting Physician: No admitting provider for patient encounter.    Cardiac Evaluation Reason: CHF    History Obtained From: patient and chart review     History of Present Illness:    This patient 88 y.o. years old with past medical history of CABG in 1990, hypertension, BPH, hyperlipidemia, who was brought to the ED with progressive shortness of breath, leg swelling, orthopnea and PND.  Found to have elevated proBNP.  Minimally elevated troponin with a flat trend 45 and 47.  CBC and BMP unremarkable.  EKG showed normal sinus rhythm no acute ST-T wave changes.    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    Past Surgical History:   has a past surgical history that includes Ankle surgery (Bilateral); Cardiac surgery; Cardiac surgery; joint replacement (Right); and Nerve Block (1/21/14).     Home Medications:    Prior to Admission medications   have seen and examined the patient and the key elements of the encounter have been performed by me. I agree with the assessment, plan and orders as documented by the resident With changes made to the note.     Electronically signed by Paluie Cobb MD on 3/9/2024 at 3:03 PM.    [facility]      [phone]

## 2024-03-09 NOTE — PLAN OF CARE
Problem: Safety - Adult  Goal: Free from fall injury  3/9/2024 0847 by Rachael Talamantes RN  Outcome: Progressing  3/9/2024 0028 by Ami Pereira RN  Outcome: Progressing     Problem: Discharge Planning  Goal: Discharge to home or other facility with appropriate resources  3/9/2024 0847 by Rachael Talamantes RN  Outcome: Progressing  3/9/2024 0028 by Ami Pereira RN  Outcome: Progressing     Problem: Skin/Tissue Integrity  Goal: Absence of new skin breakdown  Description: 1.  Monitor for areas of redness and/or skin breakdown  2.  Assess vascular access sites hourly  3.  Every 4-6 hours minimum:  Change oxygen saturation probe site  4.  Every 4-6 hours:  If on nasal continuous positive airway pressure, respiratory therapy assess nares and determine need for appliance change or resting period.  3/9/2024 0847 by Rachael Talamantes RN  Outcome: Progressing  3/9/2024 0028 by Ami Pereira RN  Outcome: Progressing     Problem: ABCDS Injury Assessment  Goal: Absence of physical injury  3/9/2024 0847 by Rachael Talamantes RN  Outcome: Progressing  3/9/2024 0028 by Ami Pereira RN  Outcome: Progressing

## 2024-03-09 NOTE — PROGRESS NOTES
[facility]  PROGRESS NOTE    Shift date: 3/8/2024  Shift day: Friday   Shift # 2    Room # 2007/2007-01   Name: Michael Mcghee                Yazidi:  Episcopal  Place of Alevism:     Referral: Routine Visit    Admit Date & Time: 3/8/2024 11:05 AM    Assessment:  Michael Mcghee is a 88 y.o. male in the hospital. Patient had family support present for additional support. Patient appeared coping and having complicated grieving during conversation with .      Intervention:  Writer introduced self and title as . Patient engaged in conversation about his health and what led to his hospital visit. Patient discussed the recent loss of his wife of over forty years to natural causes. Patient appeared grateful of family support present. Patient stated he is Episcopal and due to being in assisted living, [facility] visits patient with sacraments.  provided a supportive presence through active listening and words of affirmation.    Outcome:  Patient appeared receptive to listening presence to talk through feelings and emotions.    Plan:  Chaplains will remain available to offer spiritual and emotional support as needed.      Electronically signed by Chaplain Freddy, on 3/8/2024 at 10:41 PM.  [facility]  [facility]  [phone]

## 2024-03-09 NOTE — PLAN OF CARE
Problem: Safety - Adult  Goal: Free from fall injury  Outcome: Progressing     Problem: Discharge Planning  Goal: Discharge to home or other facility with appropriate resources  Outcome: Progressing     Problem: Skin/Tissue Integrity  Goal: Absence of new skin breakdown  Description: 1.  Monitor for areas of redness and/or skin breakdown  2.  Assess vascular access sites hourly  3.  Every 4-6 hours minimum:  Change oxygen saturation probe site  4.  Every 4-6 hours:  If on nasal continuous positive airway pressure, respiratory therapy assess nares and determine need for appliance change or resting period.  Outcome: Progressing     Problem: ABCDS Injury Assessment  Goal: Absence of physical injury  Outcome: Progressing

## 2024-03-09 NOTE — CARE COORDINATION
Case Management Assessment  Initial Evaluation    Date/Time of Evaluation: 3/9/2024 6:07 PM  Assessment Completed by: Alina Hall RN    If patient is discharged prior to next notation, then this note serves as note for discharge by case management.    Patient Name: Michael Mcghee                   YOB: 1935  Diagnosis: SOB (shortness of breath) [R06.02]  Leg swelling [M79.89]  CHF with unknown LVEF (HCC) [I50.9]  Pneumonia of right lower lobe due to infectious organism [J18.9]  New onset of congestive heart failure (HCC) [I50.9]                   Date / Time: 3/8/2024 11:05 AM    Patient Admission Status: Inpatient   Readmission Risk (Low < 19, Mod (19-27), High > 27): Readmission Risk Score: 11.4    Current PCP: Shaun Peralta, DO  PCP verified by CM? (P) Yes    Chart Reviewed: Yes      History Provided by: (P) Patient  Patient Orientation: (P) Alert and Oriented, Person, Place, Situation, Self    Patient Cognition: (P) Alert    Hospitalization in the last 30 days (Readmission):  No    If yes, Readmission Assessment in  Navigator will be completed.    Advance Directives:      Code Status: Full Code   Patient's Primary Decision Maker is: (P) Legal Next of Kin (Yari says she is HC POA-she will bring in paper work tomorrow)      Discharge Planning:    Patient lives with: (P) Other (Comment) (AL) Type of Home: (P) Assisted living  Primary Care Giver: (P) Other (Comment) (AL at [facility])  Patient Support Systems include: (P)  (sukumar Greenberg)   Current Financial resources: (P) Medicare  Current community resources: (P) Assisted Living (at [facility])  Current services prior to admission: (P) None            Current DME:              Type of Home Care services:  (P) None    ADLS  Prior functional level: (P) Assistance with the following:, Bathing, Dressing, Toileting, Cooking, Housework, Shopping, Mobility  Current functional level: (P) Assistance with the following:, Bathing, Dressing, Toileting,  right lower lobe due to infectious organism [J18.9]  New onset of congestive heart failure (HCC) [I50.9]    IF APPLICABLE: The Patient and/or patient representative Michael and his family were provided with a choice of provider and agrees with the discharge plan. Freedom of choice list with basic dialogue that supports the patient's individualized plan of care/goals and shares the quality data associated with the providers was provided to: (P) Patient   Patient Representative Name:       The Patient and/or Patient Representative Agree with the Discharge Plan? (P) Yes    Alina Hall RN  Case Management Department  Ph: [phone] Fax:

## 2024-03-10 ENCOUNTER — APPOINTMENT (OUTPATIENT)
Dept: GENERAL RADIOLOGY | Age: 89
DRG: 286 | End: 2024-03-10
Payer: MEDICARE

## 2024-03-10 LAB
ANION GAP SERPL CALCULATED.3IONS-SCNC: 11 MMOL/L (ref 9–16)
BASOPHILS # BLD: 0.03 K/UL (ref 0–0.2)
BASOPHILS NFR BLD: 0 % (ref 0–2)
BUN SERPL-MCNC: 34 MG/DL (ref 8–23)
CALCIUM SERPL-MCNC: 8.9 MG/DL (ref 8.6–10.4)
CHLORIDE SERPL-SCNC: 96 MMOL/L (ref 98–107)
CO2 SERPL-SCNC: 32 MMOL/L (ref 20–31)
CREAT SERPL-MCNC: 1.2 MG/DL (ref 0.7–1.2)
EOSINOPHIL # BLD: 0.07 K/UL (ref 0–0.44)
EOSINOPHILS RELATIVE PERCENT: 1 % (ref 1–4)
ERYTHROCYTE [DISTWIDTH] IN BLOOD BY AUTOMATED COUNT: 12.9 % (ref 11.8–14.4)
GFR SERPL CREATININE-BSD FRML MDRD: 56 ML/MIN/1.73M2
GLUCOSE SERPL-MCNC: 99 MG/DL (ref 74–99)
HCT VFR BLD AUTO: 43.6 % (ref 40.7–50.3)
HGB BLD-MCNC: 14.3 G/DL (ref 13–17)
IMM GRANULOCYTES # BLD AUTO: 0.03 K/UL (ref 0–0.3)
IMM GRANULOCYTES NFR BLD: 0 %
LYMPHOCYTES NFR BLD: 2.09 K/UL (ref 1.1–3.7)
LYMPHOCYTES RELATIVE PERCENT: 24 % (ref 24–43)
MAGNESIUM SERPL-MCNC: 2.1 MG/DL (ref 1.6–2.4)
MCH RBC QN AUTO: 30.4 PG (ref 25.2–33.5)
MCHC RBC AUTO-ENTMCNC: 32.8 G/DL (ref 28.4–34.8)
MCV RBC AUTO: 92.6 FL (ref 82.6–102.9)
MONOCYTES NFR BLD: 1.18 K/UL (ref 0.1–1.2)
MONOCYTES NFR BLD: 14 % (ref 3–12)
NEUTROPHILS NFR BLD: 61 % (ref 36–65)
NEUTS SEG NFR BLD: 5.36 K/UL (ref 1.5–8.1)
NRBC BLD-RTO: 0 PER 100 WBC
PLATELET # BLD AUTO: 170 K/UL (ref 138–453)
PMV BLD AUTO: 10.2 FL (ref 8.1–13.5)
POTASSIUM SERPL-SCNC: 3.9 MMOL/L (ref 3.7–5.3)
RBC # BLD AUTO: 4.71 M/UL (ref 4.21–5.77)
SODIUM SERPL-SCNC: 139 MMOL/L (ref 136–145)
WBC OTHER # BLD: 8.8 K/UL (ref 3.5–11.3)

## 2024-03-10 PROCEDURE — 83735 ASSAY OF MAGNESIUM: CPT

## 2024-03-10 PROCEDURE — 99233 SBSQ HOSP IP/OBS HIGH 50: CPT | Performed by: NURSE PRACTITIONER

## 2024-03-10 PROCEDURE — 6360000002 HC RX W HCPCS: Performed by: STUDENT IN AN ORGANIZED HEALTH CARE EDUCATION/TRAINING PROGRAM

## 2024-03-10 PROCEDURE — 6360000002 HC RX W HCPCS: Performed by: NURSE PRACTITIONER

## 2024-03-10 PROCEDURE — 80048 BASIC METABOLIC PNL TOTAL CA: CPT

## 2024-03-10 PROCEDURE — 99232 SBSQ HOSP IP/OBS MODERATE 35: CPT | Performed by: NURSE PRACTITIONER

## 2024-03-10 PROCEDURE — 71045 X-RAY EXAM CHEST 1 VIEW: CPT

## 2024-03-10 PROCEDURE — 2580000003 HC RX 258: Performed by: NURSE PRACTITIONER

## 2024-03-10 PROCEDURE — 6370000000 HC RX 637 (ALT 250 FOR IP): Performed by: NURSE PRACTITIONER

## 2024-03-10 PROCEDURE — 85025 COMPLETE CBC W/AUTO DIFF WBC: CPT

## 2024-03-10 PROCEDURE — 94640 AIRWAY INHALATION TREATMENT: CPT

## 2024-03-10 PROCEDURE — 2060000000 HC ICU INTERMEDIATE R&B

## 2024-03-10 PROCEDURE — 6370000000 HC RX 637 (ALT 250 FOR IP): Performed by: STUDENT IN AN ORGANIZED HEALTH CARE EDUCATION/TRAINING PROGRAM

## 2024-03-10 PROCEDURE — 36415 COLL VENOUS BLD VENIPUNCTURE: CPT

## 2024-03-10 RX ADMIN — CARVEDILOL 12.5 MG: 12.5 TABLET, FILM COATED ORAL at 08:34

## 2024-03-10 RX ADMIN — ROSUVASTATIN CALCIUM 10 MG: 10 TABLET, COATED ORAL at 08:36

## 2024-03-10 RX ADMIN — GUAIFENESIN 600 MG: 600 TABLET, EXTENDED RELEASE ORAL at 08:35

## 2024-03-10 RX ADMIN — TAMSULOSIN HYDROCHLORIDE 0.4 MG: 0.4 CAPSULE ORAL at 08:36

## 2024-03-10 RX ADMIN — GUAIFENESIN 600 MG: 600 TABLET, EXTENDED RELEASE ORAL at 20:31

## 2024-03-10 RX ADMIN — ASPIRIN 81 MG: 81 TABLET, COATED ORAL at 08:34

## 2024-03-10 RX ADMIN — SODIUM CHLORIDE, PRESERVATIVE FREE 10 ML: 5 INJECTION INTRAVENOUS at 08:54

## 2024-03-10 RX ADMIN — LOSARTAN POTASSIUM 25 MG: 50 TABLET, FILM COATED ORAL at 08:35

## 2024-03-10 RX ADMIN — FUROSEMIDE 40 MG: 10 INJECTION, SOLUTION INTRAMUSCULAR; INTRAVENOUS at 20:31

## 2024-03-10 RX ADMIN — ACETAMINOPHEN 650 MG: 325 TABLET ORAL at 21:11

## 2024-03-10 RX ADMIN — ENOXAPARIN SODIUM 40 MG: 100 INJECTION SUBCUTANEOUS at 08:35

## 2024-03-10 RX ADMIN — CARVEDILOL 12.5 MG: 12.5 TABLET, FILM COATED ORAL at 20:31

## 2024-03-10 RX ADMIN — FUROSEMIDE 40 MG: 10 INJECTION, SOLUTION INTRAMUSCULAR; INTRAVENOUS at 08:35

## 2024-03-10 RX ADMIN — ALBUTEROL SULFATE 2.5 MG: 2.5 SOLUTION RESPIRATORY (INHALATION) at 05:17

## 2024-03-10 RX ADMIN — SODIUM CHLORIDE, PRESERVATIVE FREE 10 ML: 5 INJECTION INTRAVENOUS at 20:31

## 2024-03-10 ASSESSMENT — PAIN SCALES - GENERAL: PAINLEVEL_OUTOF10: 0

## 2024-03-10 NOTE — PROGRESS NOTES
Pt continues to desat into the 80's but will not wear oxygen. Explained the risks of this decision. Pt told writer to get out of his room. Charge RN notified and NP aware.

## 2024-03-10 NOTE — PROGRESS NOTES
[facility]  Office: [phone]  Haris Bledsoe DO, Dudley Reece DO, Dennys Church DO, Jerad Felder DO, Ade Putnam MD, Thi Prather MD, Refugio Hendrickson MD, Perla Schaeffer MD,  Devan Sauer MD, Blanka Knox MD, Rosalinda Mitchell MD,  Rosalind Cabello DO, Lorene Barrett MD, Iain Garcia MD, Stalin Bledsoe DO, Darlene Woodall MD,  Tremayne Acevedo DO, Karen Vidal MD, Tina Vivar MD, Krystal Harding MD, Ileana Soriano MD,  Dereck Hernandez MD, Elvia Osullivan MD, Remigio Jaimes MD, Nathaniel Awan MD, Casey Lay MD, Omero Keys MD, Robin Landin DO, Real Alanis DO, Marianela Taylor MD,  Kane Oswald MD, Shirley Waterhouse, CNP,  Abby Meadows, CNP, Camden Rehman, CNP,  Christi Ellis, DNP, Christine Conn, CNP, Loraine Suarez, CNP, Demetria Pham CNP, Nicki Darby, CNP, Elizabeth Christopher, CNP, Trini Garcia, PA-C, Christin Brownlee, PA-C, Marli Guzman, CNP, Velvet Olivares, CNP, Ayse Layton, CNP, Tara He, CNS, Kori Claudio, CNP, Kimberly Hassan, CNP, Tracy Schwab, CNP         [facility]   IN-PATIENT SERVICE   [facility]    Progress Note    3/10/2024    8:03 AM    Name:   Michael Mcghee  MRN:     [id number]     Acct:      [id number]   Room:   2007/2007-01  IP Day:  2  Admit Date:  3/8/2024 11:05 AM    PCP:   Shaun Peralta DO  Code Status:  Full Code    Subjective:     C/C:   Chief Complaint   Patient presents with    Shortness of Breath     \"For months\"    Leg Swelling     bilateral     Interval History Status: not changed.     Seen and examined at bedside. No complaints this morning. Dyspneic with conversation with sats 87-90%. Refusing supplemental oxygen. Denies SOB. Requesting discharge. Discussed OOB and working with PT as well as pending echo. Irritable.     VS reviewed. Remains afebrile. No AM labs obtained as of yet. Orders in. Discussed with nursing.      I/O incomplete (no intake recorded) -1.7 liters/last 24 hours.     Brief

## 2024-03-10 NOTE — PLAN OF CARE
Problem: Safety - Adult  Goal: Free from fall injury  Outcome: Progressing     Problem: Discharge Planning  Goal: Discharge to home or other facility with appropriate resources  Outcome: Progressing     Problem: Skin/Tissue Integrity  Goal: Absence of new skin breakdown  Description: 1.  Monitor for areas of redness and/or skin breakdown  2.  Assess vascular access sites hourly  3.  Every 4-6 hours minimum:  Change oxygen saturation probe site  4.  Every 4-6 hours:  If on nasal continuous positive airway pressure, respiratory therapy assess nares and determine need for appliance change or resting period.  Outcome: Progressing     Problem: ABCDS Injury Assessment  Goal: Absence of physical injury  Outcome: Progressing     Problem: Chronic Conditions and Co-morbidities  Goal: Patient's chronic conditions and co-morbidity symptoms are monitored and maintained or improved  Outcome: Progressing

## 2024-03-10 NOTE — PROGRESS NOTES
[facility]   Progress Note                   Date:   3/10/2024  Patient name: Michael Mcghee  Date of admission:  3/8/2024 11:05 AM  MRN:   [id number]  YOB: 1935  PCP: Shaun Peralta DO    Reason for Admission: SOB (shortness of breath) [R06.02]  Leg swelling [M79.89]  CHF with unknown LVEF (HCC) [I50.9]  Pneumonia of right lower lobe due to infectious organism [J18.9]  New onset of congestive heart failure (HCC) [I50.9]    Subjective:       Clinical Changes / Abnormalities:Pt seen and examined in the room.  Pt denies any CP or sob.  Labs, vitals and tele reviewed-  RN in room. States pt continues to desat.  Pt appears confused this am     -5.1 L since admission     Medications:   Scheduled Meds:   losartan  25 mg Oral Daily    rosuvastatin  10 mg Oral Daily    guaiFENesin  600 mg Oral BID    aspirin  81 mg Oral Daily    carvedilol  12.5 mg Oral BID    tamsulosin  0.4 mg Oral Daily    sodium chloride flush  5-40 mL IntraVENous 2 times per day    enoxaparin  40 mg SubCUTAneous Daily    furosemide  40 mg IntraVENous BID     Continuous Infusions:   sodium chloride       CBC:   Recent Labs     03/08/24  1127 03/09/24  0647   WBC 7.1 7.6   HGB 12.8* 13.5    176     BMP:    Recent Labs     03/08/24  1127 03/09/24  0647   * 138   K 4.5 3.8   CL 97* 97*   CO2 29 29   BUN 25* 23   CREATININE 1.1 1.0   GLUCOSE 93 91     Hepatic:   Recent Labs     03/08/24  1127   AST 26   ALT 11   BILITOT 0.5   ALKPHOS 69     Troponin:   Recent Labs     03/08/24  1127 03/08/24  1242 03/08/24  1908   TROPHS 47* 44* 42*     BNP: No results for input(s): \"BNP\" in the last 72 hours.  Lipids:   Recent Labs     03/09/24  0647   CHOL 111   HDL 56     INR: No results for input(s): \"INR\" in the last 72 hours.    Objective:   Vitals: BP (!) 172/84   Pulse 90   Temp 100.1 °F (37.8 °C) (Axillary)   Resp 26   Wt 95 kg (209 lb 7 oz)   SpO2 96%   BMI 30.05 kg/m²   General appearance: alert and

## 2024-03-10 NOTE — CARE COORDINATION
TRansitional planning    Patient's niece Yari has copy of General Power of . CM copied this and faxed to registration [id number]. Original given back to Yari    Aayush Spoke to Yari at length about patient. She feels that if patient goes back to [facility] he will die, d/t such poor care. He had a couple health issues that were not address at the facility until she intervened. She would like patient to go to SNF. Patient currently not cooperative with all care. CM told her patient would have to participate in PT/OT to be able to qualify to go to a SNF. Yari relates patient responds better to \"being told\" he has to do something. If he is given a choice he will refuse. [facility] has [facility] to [facility].

## 2024-03-10 NOTE — CONSULTS
Nutrition Note    Consulted for Diet Education for new onset CHF.    89 yo M adm CHF.     Pt not appropriate for diet education d/t advanced age, current status, and lives in assisted living facility which provides meals for patient.     Suggest continue No Added Salt diet this admission, will reinforce need for no added salt when full assessment conducted at [facility].     Electronically signed by Kelin Rodriguez MS, RD, LD on 3/10/24 at 10:28 AM EDT    Contact:   Floor Mobile: [phone]  Desk Phone: [phone]  RD Weekend Mobile: [phone]

## 2024-03-11 ENCOUNTER — APPOINTMENT (OUTPATIENT)
Age: 89
DRG: 286 | End: 2024-03-11
Payer: MEDICARE

## 2024-03-11 PROBLEM — M79.89 LEG SWELLING: Status: ACTIVE | Noted: 2024-03-11

## 2024-03-11 LAB
ANION GAP SERPL CALCULATED.3IONS-SCNC: 12 MMOL/L (ref 9–16)
BASOPHILS # BLD: 0.03 K/UL (ref 0–0.2)
BASOPHILS NFR BLD: 0 % (ref 0–2)
BNP SERPL-MCNC: 803 PG/ML (ref 0–300)
BUN SERPL-MCNC: 41 MG/DL (ref 8–23)
CALCIUM SERPL-MCNC: 8.9 MG/DL (ref 8.6–10.4)
CHLORIDE SERPL-SCNC: 97 MMOL/L (ref 98–107)
CO2 SERPL-SCNC: 32 MMOL/L (ref 20–31)
CREAT SERPL-MCNC: 1.4 MG/DL (ref 0.7–1.2)
ECHO AO ASC DIAM: 3.4 CM
ECHO AO ROOT DIAM: 3.3 CM
ECHO AV AREA PEAK VELOCITY: 1 CM2
ECHO AV AREA VTI: 1 CM2
ECHO AV MEAN GRADIENT: 13 MMHG
ECHO AV MEAN VELOCITY: 1.6 M/S
ECHO AV PEAK GRADIENT: 21 MMHG
ECHO AV PEAK VELOCITY: 2.5 M/S
ECHO AV VELOCITY RATIO: 0.36
ECHO AV VTI: 47.2 CM
ECHO EST RA PRESSURE: 3 MMHG
ECHO LA AREA 2C: 21.5 CM2
ECHO LA AREA 4C: 26.1 CM2
ECHO LA DIAMETER: 4 CM
ECHO LA MAJOR AXIS: 7.4 CM
ECHO LA MINOR AXIS: 6.5 CM
ECHO LA TO AORTIC ROOT RATIO: 1.21
ECHO LA VOL BP: 71 ML (ref 18–58)
ECHO LA VOL MOD A2C: 59 ML (ref 18–58)
ECHO LA VOL MOD A4C: 78 ML (ref 18–58)
ECHO LV E' LATERAL VELOCITY: 7 CM/S
ECHO LV E' SEPTAL VELOCITY: 6 CM/S
ECHO LV EDV 3D: 126 ML
ECHO LV EJECTION FRACTION 3D: 36 %
ECHO LV ESV 3D: 80 ML
ECHO LV FRACTIONAL SHORTENING: 16 % (ref 28–44)
ECHO LV INTERNAL DIMENSION DIASTOLIC: 3.8 CM (ref 4.2–5.9)
ECHO LV INTERNAL DIMENSION SYSTOLIC: 3.2 CM
ECHO LV IVSD: 1.1 CM (ref 0.6–1)
ECHO LV MASS 2D: 125.8 G (ref 88–224)
ECHO LV MASS 3D: 202 G
ECHO LV POSTERIOR WALL DIASTOLIC: 1 CM (ref 0.6–1)
ECHO LV RELATIVE WALL THICKNESS RATIO: 0.53
ECHO LVOT AREA: 2.5 CM2
ECHO LVOT AV VTI INDEX: 0.39
ECHO LVOT DIAM: 1.8 CM
ECHO LVOT MEAN GRADIENT: 2 MMHG
ECHO LVOT PEAK GRADIENT: 3 MMHG
ECHO LVOT PEAK VELOCITY: 0.9 M/S
ECHO LVOT SV: 47.3 ML
ECHO LVOT VTI: 18.6 CM
ECHO MV A VELOCITY: 1.02 M/S
ECHO MV AREA VTI: 1.8 CM2
ECHO MV E DECELERATION TIME (DT): 266 MS
ECHO MV E VELOCITY: 0.71 M/S
ECHO MV E/A RATIO: 0.7
ECHO MV E/E' LATERAL: 10.14
ECHO MV E/E' RATIO (AVERAGED): 10.99
ECHO MV LVOT VTI INDEX: 1.4
ECHO MV MAX VELOCITY: 1.1 M/S
ECHO MV MEAN GRADIENT: 2 MMHG
ECHO MV MEAN VELOCITY: 0.6 M/S
ECHO MV PEAK GRADIENT: 5 MMHG
ECHO MV VTI: 26 CM
ECHO PV MAX VELOCITY: 1.1 M/S
ECHO PV PEAK GRADIENT: 5 MMHG
ECHO RIGHT VENTRICULAR SYSTOLIC PRESSURE (RVSP): 23 MMHG
ECHO RV FREE WALL PEAK S': 14 CM/S
ECHO RV INTERNAL DIMENSION: 4.6 CM
ECHO RV TAPSE: 1.8 CM (ref 1.7–?)
ECHO TV REGURGITANT MAX VELOCITY: 2.23 M/S
ECHO TV REGURGITANT PEAK GRADIENT: 20 MMHG
EOSINOPHIL # BLD: 0.16 K/UL (ref 0–0.44)
EOSINOPHILS RELATIVE PERCENT: 2 % (ref 1–4)
ERYTHROCYTE [DISTWIDTH] IN BLOOD BY AUTOMATED COUNT: 12.9 % (ref 11.8–14.4)
GFR SERPL CREATININE-BSD FRML MDRD: 48 ML/MIN/1.73M2
GLUCOSE SERPL-MCNC: 100 MG/DL (ref 74–99)
HCT VFR BLD AUTO: 43.3 % (ref 40.7–50.3)
HGB BLD-MCNC: 13.6 G/DL (ref 13–17)
IMM GRANULOCYTES # BLD AUTO: 0.04 K/UL (ref 0–0.3)
IMM GRANULOCYTES NFR BLD: 0 %
LYMPHOCYTES NFR BLD: 2.24 K/UL (ref 1.1–3.7)
LYMPHOCYTES RELATIVE PERCENT: 25 % (ref 24–43)
MAGNESIUM SERPL-MCNC: 2.4 MG/DL (ref 1.6–2.4)
MCH RBC QN AUTO: 30.5 PG (ref 25.2–33.5)
MCHC RBC AUTO-ENTMCNC: 31.4 G/DL (ref 28.4–34.8)
MCV RBC AUTO: 97.1 FL (ref 82.6–102.9)
MONOCYTES NFR BLD: 1.2 K/UL (ref 0.1–1.2)
MONOCYTES NFR BLD: 13 % (ref 3–12)
NEUTROPHILS NFR BLD: 60 % (ref 36–65)
NEUTS SEG NFR BLD: 5.39 K/UL (ref 1.5–8.1)
NRBC BLD-RTO: 0 PER 100 WBC
PLATELET # BLD AUTO: 180 K/UL (ref 138–453)
PMV BLD AUTO: 10.2 FL (ref 8.1–13.5)
POTASSIUM SERPL-SCNC: 3.9 MMOL/L (ref 3.7–5.3)
RBC # BLD AUTO: 4.46 M/UL (ref 4.21–5.77)
SODIUM SERPL-SCNC: 141 MMOL/L (ref 136–145)
WBC OTHER # BLD: 9.1 K/UL (ref 3.5–11.3)

## 2024-03-11 PROCEDURE — 97530 THERAPEUTIC ACTIVITIES: CPT

## 2024-03-11 PROCEDURE — 97166 OT EVAL MOD COMPLEX 45 MIN: CPT

## 2024-03-11 PROCEDURE — 6360000002 HC RX W HCPCS: Performed by: NURSE PRACTITIONER

## 2024-03-11 PROCEDURE — 2060000000 HC ICU INTERMEDIATE R&B

## 2024-03-11 PROCEDURE — 36415 COLL VENOUS BLD VENIPUNCTURE: CPT

## 2024-03-11 PROCEDURE — 99233 SBSQ HOSP IP/OBS HIGH 50: CPT

## 2024-03-11 PROCEDURE — 6370000000 HC RX 637 (ALT 250 FOR IP): Performed by: NURSE PRACTITIONER

## 2024-03-11 PROCEDURE — 97162 PT EVAL MOD COMPLEX 30 MIN: CPT

## 2024-03-11 PROCEDURE — 97535 SELF CARE MNGMENT TRAINING: CPT

## 2024-03-11 PROCEDURE — 6370000000 HC RX 637 (ALT 250 FOR IP): Performed by: STUDENT IN AN ORGANIZED HEALTH CARE EDUCATION/TRAINING PROGRAM

## 2024-03-11 PROCEDURE — 2580000003 HC RX 258: Performed by: NURSE PRACTITIONER

## 2024-03-11 PROCEDURE — 6360000002 HC RX W HCPCS: Performed by: STUDENT IN AN ORGANIZED HEALTH CARE EDUCATION/TRAINING PROGRAM

## 2024-03-11 PROCEDURE — 83735 ASSAY OF MAGNESIUM: CPT

## 2024-03-11 PROCEDURE — 80048 BASIC METABOLIC PNL TOTAL CA: CPT

## 2024-03-11 PROCEDURE — 93306 TTE W/DOPPLER COMPLETE: CPT | Performed by: INTERNAL MEDICINE

## 2024-03-11 PROCEDURE — 93306 TTE W/DOPPLER COMPLETE: CPT

## 2024-03-11 PROCEDURE — 99232 SBSQ HOSP IP/OBS MODERATE 35: CPT | Performed by: STUDENT IN AN ORGANIZED HEALTH CARE EDUCATION/TRAINING PROGRAM

## 2024-03-11 PROCEDURE — 99211 OFF/OP EST MAY X REQ PHY/QHP: CPT

## 2024-03-11 PROCEDURE — 85025 COMPLETE CBC W/AUTO DIFF WBC: CPT

## 2024-03-11 PROCEDURE — 83880 ASSAY OF NATRIURETIC PEPTIDE: CPT

## 2024-03-11 RX ORDER — HEPARIN SODIUM 5000 [USP'U]/ML
5000 INJECTION, SOLUTION INTRAVENOUS; SUBCUTANEOUS EVERY 8 HOURS SCHEDULED
Status: DISCONTINUED | OUTPATIENT
Start: 2024-03-11 | End: 2024-03-16 | Stop reason: HOSPADM

## 2024-03-11 RX ORDER — FUROSEMIDE 20 MG/1
20 TABLET ORAL DAILY
Status: DISCONTINUED | OUTPATIENT
Start: 2024-03-11 | End: 2024-03-16 | Stop reason: HOSPADM

## 2024-03-11 RX ADMIN — SODIUM CHLORIDE, PRESERVATIVE FREE 10 ML: 5 INJECTION INTRAVENOUS at 10:32

## 2024-03-11 RX ADMIN — ASPIRIN 81 MG: 81 TABLET, COATED ORAL at 10:31

## 2024-03-11 RX ADMIN — SODIUM CHLORIDE, PRESERVATIVE FREE 10 ML: 5 INJECTION INTRAVENOUS at 20:55

## 2024-03-11 RX ADMIN — GUAIFENESIN 600 MG: 600 TABLET, EXTENDED RELEASE ORAL at 10:31

## 2024-03-11 RX ADMIN — GUAIFENESIN 600 MG: 600 TABLET, EXTENDED RELEASE ORAL at 20:48

## 2024-03-11 RX ADMIN — HEPARIN SODIUM 5000 UNITS: 5000 INJECTION INTRAVENOUS; SUBCUTANEOUS at 10:31

## 2024-03-11 RX ADMIN — TAMSULOSIN HYDROCHLORIDE 0.4 MG: 0.4 CAPSULE ORAL at 10:31

## 2024-03-11 RX ADMIN — CARVEDILOL 12.5 MG: 12.5 TABLET, FILM COATED ORAL at 10:31

## 2024-03-11 RX ADMIN — ACETAMINOPHEN 650 MG: 325 TABLET ORAL at 14:19

## 2024-03-11 RX ADMIN — FUROSEMIDE 40 MG: 10 INJECTION, SOLUTION INTRAMUSCULAR; INTRAVENOUS at 10:32

## 2024-03-11 RX ADMIN — CARVEDILOL 12.5 MG: 12.5 TABLET, FILM COATED ORAL at 20:48

## 2024-03-11 RX ADMIN — HEPARIN SODIUM 5000 UNITS: 5000 INJECTION INTRAVENOUS; SUBCUTANEOUS at 18:21

## 2024-03-11 RX ADMIN — ROSUVASTATIN CALCIUM 10 MG: 10 TABLET, COATED ORAL at 10:31

## 2024-03-11 ASSESSMENT — PAIN SCALES - GENERAL
PAINLEVEL_OUTOF10: 3
PAINLEVEL_OUTOF10: 0

## 2024-03-11 NOTE — PROGRESS NOTES
[facility]   Progress Note                   Date:   3/11/2024  Patient name: Michael Mcghee  Date of admission:  3/8/2024 11:05 AM  MRN:   [id number]  YOB: 1935  PCP: Shaun Peralta DO    Reason for Admission: SOB (shortness of breath) [R06.02]  Leg swelling [M79.89]  CHF with unknown LVEF (HCC) [I50.9]  Pneumonia of right lower lobe due to infectious organism [J18.9]  New onset of congestive heart failure (HCC) [I50.9]    Subjective:       Clinical Changes / Abnormalities:Pt seen and examined in the room.  Pt denies any CP or sob.  Labs, vitals and tele reviewed. On RA - no acute distress.       Medications:   Scheduled Meds:   heparin (porcine)  5,000 Units SubCUTAneous 3 times per day    [Held by provider] losartan  25 mg Oral Daily    rosuvastatin  10 mg Oral Daily    guaiFENesin  600 mg Oral BID    aspirin  81 mg Oral Daily    carvedilol  12.5 mg Oral BID    tamsulosin  0.4 mg Oral Daily    sodium chloride flush  5-40 mL IntraVENous 2 times per day    furosemide  40 mg IntraVENous BID     Continuous Infusions:   sodium chloride       CBC:   Recent Labs     03/09/24  0647 03/10/24  1545 03/11/24  0609   WBC 7.6 8.8 9.1   HGB 13.5 14.3 13.6    170 180       BMP:    Recent Labs     03/09/24  0647 03/10/24  1545 03/11/24  0609    139 141   K 3.8 3.9 3.9   CL 97* 96* 97*   CO2 29 32* 32*   BUN 23 34* 41*   CREATININE 1.0 1.2 1.4*   GLUCOSE 91 99 100*       Hepatic:   Recent Labs     03/08/24  1127   AST 26   ALT 11   BILITOT 0.5   ALKPHOS 69       Troponin:   Recent Labs     03/08/24  1127 03/08/24  1242 03/08/24  1908   TROPHS 47* 44* 42*       BNP: No results for input(s): \"BNP\" in the last 72 hours.  Lipids:   Recent Labs     03/09/24  0647   CHOL 111   HDL 56       INR: No results for input(s): \"INR\" in the last 72 hours.    Objective:   Vitals: BP (!) 145/85   Pulse 90   Temp 97.9 °F (36.6 °C) (Axillary)   Resp 24   Wt 95 kg (209 lb 7 oz)   SpO2 92%   BMI

## 2024-03-11 NOTE — CARE COORDINATION
Voicemail left for patient's niece, Yari, requesting return call to discuss SNF     [id number] spoke to patient's niece, Yari. [facility] for [facility]. Referral sent

## 2024-03-11 NOTE — PROGRESS NOTES
[facility] Nurse  Consult Note       NAME:  Michael Mcghee  MEDICAL RECORD NUMBER:  [id number]  AGE: 88 y.o.   GENDER: male  : 1935  TODAY'S DATE:  3/11/2024    Subjective:     Reason for WOCN Evaluation and Assessment: \"left leg ulceration\"      Michael Mcghee is a 88 y.o. male referred by:   [x] Physician  [] Nursing  [] Other:     Wound Identification:  Wound Type:  healed edema blister  Contributing Factors: edema            Objective:      /66   Pulse 68   Temp 98.8 °F (37.1 °C) (Oral)   Resp 23   Wt 95 kg (209 lb 7 oz)   SpO2 97%   BMI 30.05 kg/m²   Louie Risk Score: Louie Scale Score: 15    LABS    CBC:   Lab Results   Component Value Date/Time    WBC 9.1 2024 06:09 AM    RBC 4.46 2024 06:09 AM    RBC 4.30 2015 09:10 AM    HGB 13.6 2024 06:09 AM    HCT 43.3 2024 06:09 AM     CMP:  Albumin:    Lab Results   Component Value Date/Time    LABALBU 4.1 2024 11:27 AM    LABALBU 3.8 2012 10:40 AM     PT/INR:    Lab Results   Component Value Date/Time    PROTIME 12.3 2012 05:43 AM    INR 1.1 2012 05:43 AM     HgBA1c:    Lab Results   Component Value Date/Time    LABA1C 5.5 2023 05:47 AM     PTT: No components found for: \"LABPTT\"      Assessment:     LLE edema. Superficial, scattered, dry crusts consistent with scratching on th e left lateral lower leg. No drainage. No redness    Left distal pretibial area of hyperpigmentation and hyperkeratosis consistent with ruptured and healed blistered skin.     C/o left heel pain. No redness. No open skin.                 BLE and feet washed. Moisturizing cream applied.     Response to treatment:  Well tolerated by patient.         Plan:     BLE: wash with foam cleanser and warm water, apply moisturizing cream. Daily.     Plan of Care:   [x]  Turn and reposition every 2 hours while in bed.     [x] Float heels off of bed with pillows under calves.     [] Heel protective boots (heel

## 2024-03-11 NOTE — PROGRESS NOTES
smoking. He has never used smokeless tobacco. He reports current alcohol use. He reports that he does not use drugs.     Family History:   Family History   Problem Relation Age of Onset    High Blood Pressure Mother     Heart Disease Mother     Cancer Mother     Heart Disease Father     High Blood Pressure Father        Vitals:  /66   Pulse 68   Temp 98.8 °F (37.1 °C) (Oral)   Resp 23   Wt 95 kg (209 lb 7 oz)   SpO2 97%   BMI 30.05 kg/m²   Temp (24hrs), Av.4 °F (36.9 °C), Min:97.9 °F (36.6 °C), Max:99.3 °F (37.4 °C)    No results for input(s): \"POCGLU\" in the last 72 hours.    I/O (24Hr):    Intake/Output Summary (Last 24 hours) at 3/11/2024 1543  Last data filed at 3/11/2024 1235  Gross per 24 hour   Intake 320 ml   Output 850 ml   Net -530 ml       Labs:  Hematology:  Recent Labs     24  0647 03/10/24  1545 24  0609   WBC 7.6 8.8 9.1   RBC 4.45 4.71 4.46   HGB 13.5 14.3 13.6   HCT 42.1 43.6 43.3   MCV 94.6 92.6 97.1   MCH 30.3 30.4 30.5   MCHC 32.1 32.8 31.4   RDW 12.8 12.9 12.9    170 180   MPV 9.9 10.2 10.2     Chemistry:  Recent Labs     24  1908 24  0647 03/10/24  1545 24  0609   NA  --  138 139 141   K  --  3.8 3.9 3.9   CL  --  97* 96* 97*   CO2  --  29 32* 32*   GLUCOSE  --  91 99 100*   BUN  --  23 34* 41*   CREATININE  --  1.0 1.2 1.4*   MG  --  2.0 2.1 2.4   ANIONGAP  --  12 11 12   LABGLOM  --  >60 56* 48*   CALCIUM  --  9.0 8.9 8.9   PROBNP  --  1,244*  --  803*   TROPHS 42*  --   --   --      Recent Labs     24  0647   TSH 1.35   CHOL 111   HDL 56   LDLCHOLESTEROL 37   CHOLHDLRATIO 2.0   TRIG 93   VLDL 19     ABG:No results found for: \"POCPH\", \"PHART\", \"PH\", \"POCPCO2\", \"VHQ3ABU\", \"PCO2\", \"POCPO2\", \"PO2ART\", \"PO2\", \"POCHCO3\", \"MTL0DIY\", \"HCO3\", \"NBEA\", \"PBEA\", \"BEART\", \"BE\", \"THGBART\", \"THB\", \"RLG5YBB\", \"PMQW3PXY\", \"K9BHTDRR\", \"O2SAT\", \"FIO2\"  Lab Results   Component Value Date/Time    SPECIAL NOT REPORTED 2012 08:15 AM     Lab Results    Component Value Date/Time    CULTURE NO GROWTH 5 DAYS 04/30/2012 08:15 AM    CULTURE  04/30/2012 08:15 AM     [facility] [place] [id number] [phone]       Radiology:  XR CHEST PORTABLE    Result Date: 3/10/2024  No significant interval change from prior examination.     XR CHEST (2 VW)    Result Date: 3/9/2024  1. Pulmonary vascular congestion and mild cardiomegaly. 2. Possible trace bilateral pleural effusions.     XR CHEST (2 VW)    Result Date: 3/8/2024  Bibasilar atelectasis with slightly more focal opacity in the right lower lobe lung base that may represent consolidation from pneumonia.  Recommend follow-up to resolution. Cardiomegaly with evidence of prior CABG.       Physical Examination:        General appearance:  alert, cooperative and no distress  Mental Status:  oriented to person, place and time and normal affect  Lungs:basal crackles noted, no wheezing any lung fields, normal effort  Heart:  regular rate and rhythm, no murmur  Abdomen:  soft, nontender, nondistended, normal bowel sounds, no masses, hepatomegaly, splenomegaly  Extremities:  no edema, redness, tenderness in the calves  Skin:  no gross lesions, rashes, induration    Assessment:        Hospital Problems             Last Modified POA    * (Principal) CHF with unknown LVEF (HCC) 3/9/2024 Yes    Hypertension 3/9/2024 Yes    Hyperlipidemia 3/9/2024 Yes    Elevated troponin 3/9/2024 Yes       Plan:        New onset CHF- echo pending, change lasix to oral, monitor intake and output  CAD with Elevated trop- likely 2/2 chf, history of CABG in past,cardio following,  continue aspirin and statin  Hypertension- hold cozaar, follow creatinine in am, continue coreg  Cxr does not show pneumonia  H/o subdural hematoma s/p evacuation  Pt, ot teresoal    Lorene Barrett MD  3/11/2024  3:43 PM

## 2024-03-11 NOTE — PROGRESS NOTES
Physician Progress Note      PATIENT:               ADONAY CHAPMAN  CSN #:                  [id number]  :                       1935  ADMIT DATE:       3/8/2024 11:05 AM  DISCH DATE:  RESPONDING  PROVIDER #:        Lorene Barrett MD          QUERY TEXT:    Pt admitted with acute CHF and pneumonia. Pt noted to have documented hypoxia   and dyspnea. If possible, please document in the progress notes and discharge   summary if you are evaluating and/or treating any of the following:    The medical record reflects the following:  Risk Factors: acute CHF, pneumonia  Clinical Indicators: per IM progress notes \"new onset CHF with unknown LVEF,   SOB 2/2 CHF decompensation vs CAP, Dyspneic with conversation with sats   87-90%, Intermittent hypoxia: CXR in am to evaluate CHF\", initial CXR showed   Bibasilar atelectasis with slightly more focal opacity in the right lower  lobe lung base, BNP 1244, repeat CXR showed Pulmonary vascular congestion and   mild cardiomegaly and possible trace bilat pleural effusions, noted frequent   desats to 88-89%, RR 31-32, RN documentation \"Pt continues to desat into the   80's but will not wear oxygen\"  Treatment: currently on 2L NC O2, IV Lasix, azithromycin, Rocephin, Mucinex,   nebs, CXR, pending echo, labs, cultures, cont puls ox    Thank you, JUNG FrancoN,RN, [facility]  [phone]  [email or web address]  office hours M-F [phone]  Options provided:  -- Acute respiratory failure with hypoxia  -- Acute respiratory failure with hypoxia and hypercapnia  -- Other - I will add my own diagnosis  -- Disagree - Not applicable / Not valid  -- Disagree - Clinically unable to determine / Unknown  -- Refer to Clinical Documentation Reviewer    PROVIDER RESPONSE TEXT:    This patient is in acute respiratory failure with hypoxia.    Query created by: Quita Sears on 3/11/2024 7:52 AM      Electronically signed by:  Lorene Barrett MD 3/11/2024 3:42 PM

## 2024-03-11 NOTE — PROGRESS NOTES
Physical Therapy  Facility/Department: [facility] CAR 2- STEPDOWN  Physical Therapy Initial Assessment    Name: Michael Mcghee  : 1935  MRN: [id number]  Date of Service: 3/11/2024  Chief Complaint   Patient presents with    Shortness of Breath     \"For months\"    Leg Swelling     bilateral       Discharge Recommendations:   Further therapy recommended at discharge.     PT Equipment Recommendations  Equipment Needed: No      Patient Diagnosis(es): The primary encounter diagnosis was Leg swelling. Diagnoses of New onset of congestive heart failure (HCC), SOB (shortness of breath), and Pneumonia of right lower lobe due to infectious organism were also pertinent to this visit.  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  Past Surgical History:  has a past surgical history that includes Ankle surgery (Bilateral); Cardiac surgery; Cardiac surgery; joint replacement (Right); and Nerve Block (14).    Assessment   Body Structures, Functions, Activity Limitations Requiring Skilled Therapeutic Intervention: Decreased functional mobility ;Decreased strength;Decreased endurance;Decreased balance  Assessment: Pt maxA+2 in STS w/ RW, maxA+2 in bed mobility. Pt reports being IND in bed to WC transfers at assisted living, is able to propel manual WC to cafeteria for food.  shower  Bathroom Toilet: Handicap height  Bathroom Equipment: Shower chair, Grab bars around toilet  Home Equipment: Walker, rolling, Wheelchair-manual (Pt propels WC to cafeteria by self)  Has the patient had two or more falls in the past year or any fall with injury in the past year?: Yes (Pt reports seldom)  Receives Help From: Other (comment) (staff at [facility])  ADL Assistance: Needs assistance  Toileting: Needs assistance  Homemaking Assistance: Needs assistance  Homemaking Responsibilities: Yes  Meal Prep Responsibility: No (Staff supplies meals)  Laundry Responsibility: No  Cleaning Responsibility: No (Staff cleans)  Bill Paying/Finance Responsibility: No  Shopping Responsibility: No  Ambulation Assistance: Non-ambulatory  Transfer Assistance: Independent  Active : No  Patient's  Info: niece or newphew  Occupation: Retired  Type of Occupation:   Leisure & Hobbies: watching tv  Vision/Hearing  Vision  Vision: Impaired  Vision Exceptions: Wears glasses for reading  Hearing  Hearing: Exceptions to WFL  Hearing Exceptions: Hard of hearing/hearing concerns    Cognition   Orientation  Overall Orientation Status: Within Functional Limits  Cognition  Overall Cognitive Status: WFL     Objective   AROM RLE (degrees)  RLE AROM: WFL  AROM LLE (degrees)  LLE AROM : WFL  LLE General AROM: Knee extension AROM lacking ~10 degrees from terminal knee ext, PROM lacking ~5 degrees. Otherwise WFL.  AROM RUE (degrees)  RUE General AROM: See OT  AROM LUE (degrees)  LUE General AROM: See OT  Strength RLE  R Hip Flexion: 3/5  R Knee Extension: 4-/5  R Ankle Dorsiflexion: 4-/5  R Ankle Plantar flexion: 3+/5  Strength LLE  L Hip Flexion: 3/5  L Knee Extension: 2+/5  L Ankle Dorsiflexion: 3/5  L Ankle Plantar Flexion: 3/5  Strength RUE  Comment: See OT  Strength LUE  Comment: See OT           Bed mobility  Supine to Sit: 2 Person assistance;Maximum assistance  Sit to Supine: Maximum assistance;2 Person

## 2024-03-11 NOTE — PROGRESS NOTES
[facility]    Shift date: 3/11/24  Shift day: Monday   Shift # 1    Room # 2007/2007-01   Name: Michael Mcghee                Amish: Jain   Place of [facility]ian[facility]: [facility]    Referral: Routine Visit    Admit Date & Time: 3/8/2024 11:05 AM    Assessment:  Michael Mcghee is a 88 y.o. male in the hospital.  received a spiritual care consult for \"cultural care.\" Upon entering the room writer observes pt sitting up in bed watching tv. Pt was open to  visit.       Intervention:  Writer introduced self and title as . Pt shared that they were \"frustrated\" because he did not know what was going on. Pt shared they \"wanted to get out of here.\"  provided a ministry of presence, active listening, and a prayer.     Outcome:  Pt was grateful for  visit. Pt also wanted  to visit for sacrament of the sick. Pt also asked  to contact [facility].  contacted [facility] that pt was in the hospital at 11:20 AM    Plan:  Chaplains will remain available to offer spiritual and emotional support as needed.      Electronically signed by Ruth Carter, Intern, on 3/11/2024 at 11:31 AM.  [facility]  [phone]       03/11/24 1130   Encounter Summary   Encounter Overview/Reason  Spiritual/Emotional Needs   Service Provided For: Patient   Referral/Consult From: Multi-disciplinary team   Support System Family members   Last Encounter  03/11/24   Complexity of Encounter Low   Begin Time 1045   End Time  1055   Total Time Calculated 10 min   Assessment/Intervention/Outcome   Assessment Calm;Stress overload;Angry   Intervention Active listening;Sustaining Presence/Ministry of presence;Prayer (assurance of)/[place]   Outcome Engaged in conversation;Expressed Gratitude     Electronically signed by Ruth Carter on 3/11/2024 at 11:33 AM

## 2024-03-11 NOTE — PLAN OF CARE
Problem: Safety - Adult  Goal: Free from fall injury  Outcome: Progressing     Problem: Discharge Planning  Goal: Discharge to home or other facility with appropriate resources  Outcome: Progressing  Flowsheets (Taken 3/11/2024 0930)  Discharge to home or other facility with appropriate resources:   Identify barriers to discharge with patient and caregiver   Arrange for needed discharge resources and transportation as appropriate   Identify discharge learning needs (meds, wound care, etc)     Problem: Skin/Tissue Integrity  Goal: Absence of new skin breakdown  Description: 1.  Monitor for areas of redness and/or skin breakdown  2.  Assess vascular access sites hourly  3.  Every 4-6 hours minimum:  Change oxygen saturation probe site  4.  Every 4-6 hours:  If on nasal continuous positive airway pressure, respiratory therapy assess nares and determine need for appliance change or resting period.  Outcome: Progressing     Problem: ABCDS Injury Assessment  Goal: Absence of physical injury  Outcome: Progressing     Problem: Chronic Conditions and Co-morbidities  Goal: Patient's chronic conditions and co-morbidity symptoms are monitored and maintained or improved  Outcome: Progressing  Flowsheets (Taken 3/11/2024 0930)  Care Plan - Patient's Chronic Conditions and Co-Morbidity Symptoms are Monitored and Maintained or Improved:   Monitor and assess patient's chronic conditions and comorbid symptoms for stability, deterioration, or improvement   Collaborate with multidisciplinary team to address chronic and comorbid conditions and prevent exacerbation or deterioration   Update acute care plan with appropriate goals if chronic or comorbid symptoms are exacerbated and prevent overall improvement and discharge

## 2024-03-11 NOTE — PROGRESS NOTES
Term Goal 7: Demo 2 EC/WS techniques during ADLs       Therapy Time   Individual Concurrent Group Co-treatment   Time In 1424         Time Out 1518         Minutes 54          Co- treatment with PT warranted secondary to decreased patient safety and independence with functional mobility requiring skilled physical assistance of two professionals to simultaneously address individualized discipline goals. OT is addressing balance deficits during ADLs while PT is addressing their individualized functional mobility task.        JOSE D Dangelo

## 2024-03-12 ENCOUNTER — APPOINTMENT (OUTPATIENT)
Dept: ULTRASOUND IMAGING | Age: 89
DRG: 286 | End: 2024-03-12
Payer: MEDICARE

## 2024-03-12 PROBLEM — I50.21 ACUTE SYSTOLIC CONGESTIVE HEART FAILURE (HCC): Status: ACTIVE | Noted: 2024-03-12

## 2024-03-12 PROBLEM — N17.9 AKI (ACUTE KIDNEY INJURY) (HCC): Status: ACTIVE | Noted: 2024-03-12

## 2024-03-12 LAB
ANION GAP SERPL CALCULATED.3IONS-SCNC: 11 MMOL/L (ref 9–16)
ANION GAP SERPL CALCULATED.3IONS-SCNC: 14 MMOL/L (ref 9–16)
BUN SERPL-MCNC: 52 MG/DL (ref 8–23)
BUN SERPL-MCNC: 53 MG/DL (ref 8–23)
CALCIUM SERPL-MCNC: 8.6 MG/DL (ref 8.6–10.4)
CALCIUM SERPL-MCNC: 8.9 MG/DL (ref 8.6–10.4)
CHLORIDE SERPL-SCNC: 96 MMOL/L (ref 98–107)
CHLORIDE SERPL-SCNC: 98 MMOL/L (ref 98–107)
CO2 SERPL-SCNC: 24 MMOL/L (ref 20–31)
CO2 SERPL-SCNC: 31 MMOL/L (ref 20–31)
CREAT SERPL-MCNC: 1.2 MG/DL (ref 0.7–1.2)
CREAT SERPL-MCNC: 1.5 MG/DL (ref 0.7–1.2)
FREE KAPPA/LAMBDA RATIO: 1.6 (ref 0.22–1.74)
GFR SERPL CREATININE-BSD FRML MDRD: 44 ML/MIN/1.73M2
GLUCOSE SERPL-MCNC: 128 MG/DL (ref 74–99)
GLUCOSE SERPL-MCNC: 95 MG/DL (ref 74–99)
KAPPA LC FREE SER-MCNC: 90.2 MG/L
LAMBDA LC FREE SERPL-MCNC: 56.5 MG/L (ref 4.2–27.7)
MAGNESIUM SERPL-MCNC: 2.7 MG/DL (ref 1.6–2.4)
POTASSIUM SERPL-SCNC: 3.9 MMOL/L (ref 3.7–5.3)
POTASSIUM SERPL-SCNC: 4 MMOL/L (ref 3.7–5.3)
SODIUM SERPL-SCNC: 136 MMOL/L (ref 136–145)
SODIUM SERPL-SCNC: 138 MMOL/L (ref 136–145)

## 2024-03-12 PROCEDURE — 83521 IG LIGHT CHAINS FREE EACH: CPT

## 2024-03-12 PROCEDURE — 84165 PROTEIN E-PHORESIS SERUM: CPT

## 2024-03-12 PROCEDURE — 84155 ASSAY OF PROTEIN SERUM: CPT

## 2024-03-12 PROCEDURE — 6370000000 HC RX 637 (ALT 250 FOR IP): Performed by: NURSE PRACTITIONER

## 2024-03-12 PROCEDURE — 76775 US EXAM ABDO BACK WALL LIM: CPT

## 2024-03-12 PROCEDURE — 83735 ASSAY OF MAGNESIUM: CPT

## 2024-03-12 PROCEDURE — 80074 ACUTE HEPATITIS PANEL: CPT

## 2024-03-12 PROCEDURE — 2580000003 HC RX 258: Performed by: NURSE PRACTITIONER

## 2024-03-12 PROCEDURE — 2580000003 HC RX 258: Performed by: STUDENT IN AN ORGANIZED HEALTH CARE EDUCATION/TRAINING PROGRAM

## 2024-03-12 PROCEDURE — 80048 BASIC METABOLIC PNL TOTAL CA: CPT

## 2024-03-12 PROCEDURE — 86225 DNA ANTIBODY NATIVE: CPT

## 2024-03-12 PROCEDURE — 6360000002 HC RX W HCPCS: Performed by: STUDENT IN AN ORGANIZED HEALTH CARE EDUCATION/TRAINING PROGRAM

## 2024-03-12 PROCEDURE — 86160 COMPLEMENT ANTIGEN: CPT

## 2024-03-12 PROCEDURE — 99223 1ST HOSP IP/OBS HIGH 75: CPT | Performed by: INTERNAL MEDICINE

## 2024-03-12 PROCEDURE — 6370000000 HC RX 637 (ALT 250 FOR IP): Performed by: STUDENT IN AN ORGANIZED HEALTH CARE EDUCATION/TRAINING PROGRAM

## 2024-03-12 PROCEDURE — 86038 ANTINUCLEAR ANTIBODIES: CPT

## 2024-03-12 PROCEDURE — 99232 SBSQ HOSP IP/OBS MODERATE 35: CPT | Performed by: STUDENT IN AN ORGANIZED HEALTH CARE EDUCATION/TRAINING PROGRAM

## 2024-03-12 PROCEDURE — 36415 COLL VENOUS BLD VENIPUNCTURE: CPT

## 2024-03-12 PROCEDURE — 99233 SBSQ HOSP IP/OBS HIGH 50: CPT

## 2024-03-12 PROCEDURE — 2060000000 HC ICU INTERMEDIATE R&B

## 2024-03-12 PROCEDURE — 86334 IMMUNOFIX E-PHORESIS SERUM: CPT

## 2024-03-12 RX ORDER — METOPROLOL SUCCINATE 25 MG/1
50 TABLET, EXTENDED RELEASE ORAL DAILY
Status: DISCONTINUED | OUTPATIENT
Start: 2024-03-12 | End: 2024-03-16 | Stop reason: HOSPADM

## 2024-03-12 RX ORDER — POLYVINYL ALCOHOL 14 MG/ML
1 SOLUTION/ DROPS OPHTHALMIC PRN
Status: DISCONTINUED | OUTPATIENT
Start: 2024-03-12 | End: 2024-03-12 | Stop reason: RX

## 2024-03-12 RX ORDER — SODIUM CHLORIDE 9 MG/ML
INJECTION, SOLUTION INTRAVENOUS CONTINUOUS
Status: ACTIVE | OUTPATIENT
Start: 2024-03-12 | End: 2024-03-12

## 2024-03-12 RX ADMIN — GUAIFENESIN 600 MG: 600 TABLET, EXTENDED RELEASE ORAL at 09:32

## 2024-03-12 RX ADMIN — ASPIRIN 81 MG: 81 TABLET, COATED ORAL at 09:32

## 2024-03-12 RX ADMIN — ROSUVASTATIN CALCIUM 10 MG: 10 TABLET, COATED ORAL at 09:32

## 2024-03-12 RX ADMIN — SODIUM CHLORIDE, PRESERVATIVE FREE 10 ML: 5 INJECTION INTRAVENOUS at 20:34

## 2024-03-12 RX ADMIN — METOPROLOL SUCCINATE 50 MG: 25 TABLET, EXTENDED RELEASE ORAL at 09:56

## 2024-03-12 RX ADMIN — TAMSULOSIN HYDROCHLORIDE 0.4 MG: 0.4 CAPSULE ORAL at 09:32

## 2024-03-12 RX ADMIN — HEPARIN SODIUM 5000 UNITS: 5000 INJECTION INTRAVENOUS; SUBCUTANEOUS at 19:44

## 2024-03-12 RX ADMIN — HEPARIN SODIUM 5000 UNITS: 5000 INJECTION INTRAVENOUS; SUBCUTANEOUS at 02:02

## 2024-03-12 RX ADMIN — HEPARIN SODIUM 5000 UNITS: 5000 INJECTION INTRAVENOUS; SUBCUTANEOUS at 09:32

## 2024-03-12 RX ADMIN — SODIUM CHLORIDE: 9 INJECTION, SOLUTION INTRAVENOUS at 10:00

## 2024-03-12 RX ADMIN — GUAIFENESIN 600 MG: 600 TABLET, EXTENDED RELEASE ORAL at 20:34

## 2024-03-12 ASSESSMENT — PAIN SCALES - GENERAL
PAINLEVEL_OUTOF10: 0

## 2024-03-12 NOTE — CONSULTS
Renal Consult Note    Patient :  Michael Mcghee; 88 y.o. MRN# [id number]  Location:  2007/2007-01  Attending:  Lorene Barrett MD  Admit Date:  3/8/2024   Hospital Day: 4    Reason for Consult:     Asked by Lorene Bretrand MD to see for NADIR/Elevated Creatinine and cardiac cath clearance.    History Obtained From:     Patient, electronic medical record, patient's niece.    History of Present Illness:     Michael Mcghee; 88 y.o. male with past medical history of hypertension, BPH, hyperlipidemia, history of subdural hematoma, history of spinal stenosis, coronary artery disease history of CABG in 1990 presented to the hospital with the chief complaint of progressive shortness of breath, lower extremity edema, orthopnea and PND.  Patient came to the hospital for further management was found to have CHF and initiated on IV diuretics.  Patient did get a 2D echo done on 3/11/2024 which showed EF of 30 to 35% with global hypokinesis and grade 1 diastolic dysfunction.  Mild stenosis of aortic valve, mild mitral regurgitation. Patient mentioned unable to lay flat in bed and has been sleeping in bedside chair.  Chest x-ray was done on 3/8/2024 which showed bibasilar atelectasis with slight more apical opacity in the right lower lobe lung base which may represent consolidation for pneumonia.  Recommend follow-up to resolution.  Cardiomegaly with evidence of prior CABG  Patient's BMP from today showed sodium 138, potassium 4.0, chloride 96, bicarb 31, calcium 8.9, BUN 52, creatinine 1.5 mg/dl.  Home medications do include lisinopril 5 mg daily and patient did receive losartan this admission which is now currently stopped.  Patient was evaluated by cardiology and was found to have new onset CHF with low EF and they recommended cardiac catheterization.  Nephrology is consulted to for cath status and NADIR.    No history of recent contrast exposure, No h/o prolonged NSAIDs use in the past, No h/o nephrolithiasis, No recent skin rashes

## 2024-03-12 NOTE — PROGRESS NOTES
[facility]   Progress Note                   Date:   3/12/2024  Patient name: Michael Mcghee  Date of admission:  3/8/2024 11:05 AM  MRN:   [id number]  YOB: 1935  PCP: Shaun Peralta DO    Reason for Admission: SOB (shortness of breath) [R06.02]  Leg swelling [M79.89]  CHF with unknown LVEF (HCC) [I50.9]  Pneumonia of right lower lobe due to infectious organism [J18.9]  New onset of congestive heart failure (HCC) [I50.9]    Subjective:       Clinical Changes / Abnormalities:Pt seen and examined in the room.  Pt denies any CP or sob.  Labs, vitals and tele reviewed. On NC - no acute distress. Up to chair.       Medications:   Scheduled Meds:   metoprolol succinate  50 mg Oral Daily    heparin (porcine)  5,000 Units SubCUTAneous 3 times per day    [Held by provider] furosemide  20 mg Oral Daily    [Held by provider] losartan  25 mg Oral Daily    rosuvastatin  10 mg Oral Daily    guaiFENesin  600 mg Oral BID    aspirin  81 mg Oral Daily    tamsulosin  0.4 mg Oral Daily    sodium chloride flush  5-40 mL IntraVENous 2 times per day     Continuous Infusions:   sodium chloride 50 mL/hr at 03/12/24 1000    sodium chloride       CBC:   Recent Labs     03/10/24  1545 03/11/24  0609   WBC 8.8 9.1   HGB 14.3 13.6    180       BMP:    Recent Labs     03/10/24  1545 03/11/24  0609 03/12/24  0808    141 138   K 3.9 3.9 4.0   CL 96* 97* 96*   CO2 32* 32* 31   BUN 34* 41* 53*   CREATININE 1.2 1.4* 1.5*   GLUCOSE 99 100* 95       Hepatic:   No results for input(s): \"AST\", \"ALT\", \"ALB\", \"BILITOT\", \"ALKPHOS\" in the last 72 hours.    Troponin:   No results for input(s): \"TROPHS\" in the last 72 hours.    BNP: No results for input(s): \"BNP\" in the last 72 hours.  Lipids:   No results for input(s): \"CHOL\", \"HDL\" in the last 72 hours.    Invalid input(s): \"LDLCALCU\"    INR: No results for input(s): \"INR\" in the last 72 hours.    Objective:   Vitals: BP (!) 104/46   Pulse 73   Temp 98.9 °F  agrees to proceed when cleared by Nephro.   Keep K >4 and Mg >2     Electronically signed by KINDRA Randall - CNP on 3/12/2024 at 10:16 AM  [facility] [employer] [facility]  [phone]

## 2024-03-12 NOTE — PROGRESS NOTES
[facility]  Office: [phone]  Haris Bledsoe DO, Dudley Reece DO, Dennys Church DO, Jerad Felder DO, Ade Putnam MD, Thi Prather MD, Refugio Hendrickson MD, Perla Schaeffer MD,  Devan Sauer MD, Blanka Knox MD, Rosalinda Mitchell MD,  Rosalind Cabello DO, Lorene Barrett MD, Iain Garcia MD, Stalin Bledsoe DO, Darlene Woodall MD,  Tremayne Acevedo DO, Karen Vidal MD, Tina Vivar MD, Krystal Harding MD, Ileana Soriano MD,  Dereck Hernandez MD, Elvia Osullivan MD, Remigio Jaimes MD, Nathaniel Awan MD, Casey Lay MD, Omero Keys MD, Robin Landin DO, Real Alanis DO, Marianela Taylor MD,  Kane Oswald MD, Shirley Waterhouse, CNP,  Abby Meadows, CNP, Camden Rehman, CNP,  Christi Ellis, DNP, Christine Conn, CNP, Loraine Suarez, CNP, Demetria Pham CNP, Nicki Darby, CNP, Elizabeth Christopher, CNP, Trini Garcia, PA-C, Christin Brownlee, PA-C, Marli Guzmna, CNP, Velvet Olivares, CNP, Ayse Layton, CNP, Tara He, CNS, Kori Claudio, CNP, Kimberly Hassan, CNP, Tracy Schwab, CNP         [facility]   IN-PATIENT SERVICE   [facility]    Progress Note    3/12/2024    1:12 PM    Name:   Michael Mcghee  MRN:     [id number]     Acct:      [id number]   Room:   2007/2007-01  IP Day:  4  Admit Date:  3/8/2024 11:05 AM    PCP:   Shaun Peralta DO  Code Status:  Full Code    Subjective:     C/C:   Chief Complaint   Patient presents with    Shortness of Breath     \"For months\"    Leg Swelling     bilateral     Interval History Status: not changed.     Patient seen and examined.  Patient complains of chronic shortness of breath.  He is saying that he does not want procedures to be done.  Patient wants to be full code and wants resuscitation and he denied this.  He states that most of his family is dead and now he only has nieces and nephews around.  Blood pressure has improved to 120 systolic.  He is afebrile.  He is on 2 L nasal cannula oxygen.  Recommendations

## 2024-03-12 NOTE — PROGRESS NOTES
Physician Progress Note      PATIENT:               ADONAY CHAPMAN  CSN #:                  [id number]  :                       1935  ADMIT DATE:       3/8/2024 11:05 AM  DISCH DATE:  RESPONDING  PROVIDER #:        Lorene Barrett MD          QUERY TEXT:    Pt admitted with new onset CHF. If possible, please document in progress notes   and discharge summary further specificity regarding the type of acute CHF:    The medical record reflects the following:  Risk Factors: HTN, CAD  Clinical Indicators: per IM progress notes \"Acute new onset CHF, unknown LVEF,   Chest x-ray showed concern for cardiomegaly with mild pulmonary edema, acute   respiratory failure with hypoxia\", , echo showed EF 30-35% with normal   diastolic function  Treatment: IV Lasix 40 mg IV twice daily, CXR, echo, cardiology consult, labs,   EKG, supplemental O2, cardiac Na restricted diet, fluid restriction, strict   I&O's, cont pulse ox    Thank you, HENRIQUE Franco,RN, [facility]  [phone]  [email or web address]  office hours M-F [phone]  Options provided:  -- Acute Systolic CHF/HFrEF  -- Other - I will add my own diagnosis  -- Disagree - Not applicable / Not valid  -- Disagree - Clinically unable to determine / Unknown  -- Refer to Clinical Documentation Reviewer    PROVIDER RESPONSE TEXT:    This patient is in acute systolic CHF/HFrEF.    Query created by: Quita Sears on 3/12/2024 7:41 AM      Electronically signed by:  Lorene Barrett MD 3/12/2024 7:45 AM

## 2024-03-12 NOTE — CARE COORDINATION
Voicemail left for Maria T from [facility] requesting return call to follow up on referral    1500 spoke to Maria T from [facility]. She does not have access to Epic right now. Clinicals faxed to [phone] 1530 received call from Maria T from [facility]. They are not able to accept d/t no bed    1615 patient's niece, Yari, updated. She will review list and provide more choices    1700 received call from Yari. She would like [facility]. Referral sent

## 2024-03-13 LAB
ANION GAP SERPL CALCULATED.3IONS-SCNC: 11 MMOL/L (ref 9–17)
BACTERIA URNS QL MICRO: ABNORMAL
BILIRUB UR QL STRIP: NEGATIVE
BUN SERPL-MCNC: 44 MG/DL (ref 8–23)
CALCIUM SERPL-MCNC: 8.8 MG/DL (ref 8.6–10.4)
CASTS #/AREA URNS LPF: ABNORMAL /LPF (ref 0–8)
CHLORIDE SERPL-SCNC: 99 MMOL/L (ref 98–107)
CHLORIDE UR-SCNC: <20 MMOL/L
CLARITY UR: CLEAR
CO2 SERPL-SCNC: 27 MMOL/L (ref 20–31)
COLOR UR: YELLOW
CREAT SERPL-MCNC: 1.1 MG/DL (ref 0.7–1.2)
CREAT UR-MCNC: 76 MG/DL (ref 39–259)
EPI CELLS #/AREA URNS HPF: ABNORMAL /HPF (ref 0–5)
GFR SERPL CREATININE-BSD FRML MDRD: >60 ML/MIN/1.73M2
GLUCOSE SERPL-MCNC: 97 MG/DL (ref 74–99)
GLUCOSE UR STRIP-MCNC: NEGATIVE MG/DL
HGB UR QL STRIP.AUTO: NEGATIVE
KETONES UR STRIP-MCNC: NEGATIVE MG/DL
LEUKOCYTE ESTERASE UR QL STRIP: NEGATIVE
MAGNESIUM SERPL-MCNC: 2.7 MG/DL (ref 1.6–2.4)
NITRITE UR QL STRIP: NEGATIVE
PH UR STRIP: 6 [PH] (ref 5–8)
POTASSIUM SERPL-SCNC: 3.9 MMOL/L (ref 3.7–5.3)
PROT UR STRIP-MCNC: ABNORMAL MG/DL
RBC #/AREA URNS HPF: ABNORMAL /HPF (ref 0–4)
SODIUM SERPL-SCNC: 137 MMOL/L (ref 136–145)
SODIUM UR-SCNC: 49 MMOL/L
SP GR UR STRIP: 1.02 (ref 1–1.03)
TOTAL PROTEIN, URINE: 53 MG/DL
UROBILINOGEN UR STRIP-ACNC: NORMAL EU/DL (ref 0–1)
WBC #/AREA URNS HPF: ABNORMAL /HPF (ref 0–5)

## 2024-03-13 PROCEDURE — 36415 COLL VENOUS BLD VENIPUNCTURE: CPT

## 2024-03-13 PROCEDURE — 6370000000 HC RX 637 (ALT 250 FOR IP): Performed by: STUDENT IN AN ORGANIZED HEALTH CARE EDUCATION/TRAINING PROGRAM

## 2024-03-13 PROCEDURE — 6360000002 HC RX W HCPCS: Performed by: STUDENT IN AN ORGANIZED HEALTH CARE EDUCATION/TRAINING PROGRAM

## 2024-03-13 PROCEDURE — 84166 PROTEIN E-PHORESIS/URINE/CSF: CPT

## 2024-03-13 PROCEDURE — 97535 SELF CARE MNGMENT TRAINING: CPT

## 2024-03-13 PROCEDURE — 86335 IMMUNFIX E-PHORSIS/URINE/CSF: CPT

## 2024-03-13 PROCEDURE — 99233 SBSQ HOSP IP/OBS HIGH 50: CPT | Performed by: SURGERY

## 2024-03-13 PROCEDURE — 2060000000 HC ICU INTERMEDIATE R&B

## 2024-03-13 PROCEDURE — 80048 BASIC METABOLIC PNL TOTAL CA: CPT

## 2024-03-13 PROCEDURE — 84300 ASSAY OF URINE SODIUM: CPT

## 2024-03-13 PROCEDURE — 97530 THERAPEUTIC ACTIVITIES: CPT

## 2024-03-13 PROCEDURE — 2580000003 HC RX 258: Performed by: NURSE PRACTITIONER

## 2024-03-13 PROCEDURE — 6360000002 HC RX W HCPCS: Performed by: INTERNAL MEDICINE

## 2024-03-13 PROCEDURE — 6370000000 HC RX 637 (ALT 250 FOR IP): Performed by: INTERNAL MEDICINE

## 2024-03-13 PROCEDURE — 94640 AIRWAY INHALATION TREATMENT: CPT

## 2024-03-13 PROCEDURE — 81001 URINALYSIS AUTO W/SCOPE: CPT

## 2024-03-13 PROCEDURE — 84156 ASSAY OF PROTEIN URINE: CPT

## 2024-03-13 PROCEDURE — 83735 ASSAY OF MAGNESIUM: CPT

## 2024-03-13 PROCEDURE — 99232 SBSQ HOSP IP/OBS MODERATE 35: CPT | Performed by: INTERNAL MEDICINE

## 2024-03-13 PROCEDURE — 6370000000 HC RX 637 (ALT 250 FOR IP): Performed by: NURSE PRACTITIONER

## 2024-03-13 PROCEDURE — 82570 ASSAY OF URINE CREATININE: CPT

## 2024-03-13 PROCEDURE — 82436 ASSAY OF URINE CHLORIDE: CPT

## 2024-03-13 PROCEDURE — 97110 THERAPEUTIC EXERCISES: CPT

## 2024-03-13 PROCEDURE — 99232 SBSQ HOSP IP/OBS MODERATE 35: CPT | Performed by: STUDENT IN AN ORGANIZED HEALTH CARE EDUCATION/TRAINING PROGRAM

## 2024-03-13 RX ORDER — ACETYLCYSTEINE 200 MG/ML
600 SOLUTION ORAL; RESPIRATORY (INHALATION)
Status: DISCONTINUED | OUTPATIENT
Start: 2024-03-13 | End: 2024-03-14 | Stop reason: CLARIF

## 2024-03-13 RX ORDER — FUROSEMIDE 20 MG/1
20 TABLET ORAL ONCE
Status: COMPLETED | OUTPATIENT
Start: 2024-03-13 | End: 2024-03-13

## 2024-03-13 RX ORDER — SODIUM CHLORIDE 9 MG/ML
INJECTION, SOLUTION INTRAVENOUS CONTINUOUS
Status: DISCONTINUED | OUTPATIENT
Start: 2024-03-14 | End: 2024-03-14

## 2024-03-13 RX ADMIN — SODIUM CHLORIDE, PRESERVATIVE FREE 10 ML: 5 INJECTION INTRAVENOUS at 10:13

## 2024-03-13 RX ADMIN — GUAIFENESIN 600 MG: 600 TABLET, EXTENDED RELEASE ORAL at 10:12

## 2024-03-13 RX ADMIN — TAMSULOSIN HYDROCHLORIDE 0.4 MG: 0.4 CAPSULE ORAL at 10:12

## 2024-03-13 RX ADMIN — HEPARIN SODIUM 5000 UNITS: 5000 INJECTION INTRAVENOUS; SUBCUTANEOUS at 18:20

## 2024-03-13 RX ADMIN — HEPARIN SODIUM 5000 UNITS: 5000 INJECTION INTRAVENOUS; SUBCUTANEOUS at 03:19

## 2024-03-13 RX ADMIN — METOPROLOL SUCCINATE 50 MG: 25 TABLET, EXTENDED RELEASE ORAL at 10:12

## 2024-03-13 RX ADMIN — HEPARIN SODIUM 5000 UNITS: 5000 INJECTION INTRAVENOUS; SUBCUTANEOUS at 10:12

## 2024-03-13 RX ADMIN — ACETYLCYSTEINE 600 MG: 200 INHALANT RESPIRATORY (INHALATION) at 20:56

## 2024-03-13 RX ADMIN — ASPIRIN 81 MG: 81 TABLET, COATED ORAL at 10:12

## 2024-03-13 RX ADMIN — ACETAMINOPHEN 650 MG: 325 TABLET ORAL at 21:58

## 2024-03-13 RX ADMIN — ALBUTEROL SULFATE 2.5 MG: 2.5 SOLUTION RESPIRATORY (INHALATION) at 20:56

## 2024-03-13 RX ADMIN — GUAIFENESIN 600 MG: 600 TABLET, EXTENDED RELEASE ORAL at 21:03

## 2024-03-13 RX ADMIN — ROSUVASTATIN CALCIUM 10 MG: 10 TABLET, COATED ORAL at 10:12

## 2024-03-13 RX ADMIN — FUROSEMIDE 20 MG: 20 TABLET ORAL at 18:20

## 2024-03-13 RX ADMIN — SODIUM CHLORIDE, PRESERVATIVE FREE 10 ML: 5 INJECTION INTRAVENOUS at 21:03

## 2024-03-13 ASSESSMENT — PAIN SCALES - GENERAL
PAINLEVEL_OUTOF10: 0
PAINLEVEL_OUTOF10: 5
PAINLEVEL_OUTOF10: 0
PAINLEVEL_OUTOF10: 5

## 2024-03-13 ASSESSMENT — PAIN DESCRIPTION - DESCRIPTORS: DESCRIPTORS: ACHING

## 2024-03-13 ASSESSMENT — PAIN DESCRIPTION - LOCATION: LOCATION: BACK

## 2024-03-13 ASSESSMENT — PAIN SCALES - WONG BAKER
WONGBAKER_NUMERICALRESPONSE: HURTS A LITTLE BIT
WONGBAKER_NUMERICALRESPONSE: 2

## 2024-03-13 NOTE — PROGRESS NOTES
Occupational Therapy  Facility/Department: [facility] CAR 2- STEPDOWN  Occupational Therapy Daily Treatment Note    Name: Michael Mcghee  : 1935  MRN: [id number]  Date of Service: 3/13/2024    Discharge Recommendations:  Patient would benefit from continued therapy after discharge          Patient Diagnosis(es): The primary encounter diagnosis was Leg swelling. Diagnoses of New onset of congestive heart failure (HCC), SOB (shortness of breath), and Pneumonia of right lower lobe due to infectious organism were also pertinent to this visit.  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  Past Surgical History:  has a past surgical history that includes Ankle surgery (Bilateral); Cardiac surgery; Cardiac surgery; joint replacement (Right); and Nerve Block (14).    Treatment Diagnosis: CHF with unknown LVEF (HCC)      Assessment   Performance deficits / Impairments: Decreased functional mobility ;Decreased ADL status;Decreased strength;Decreased safe awareness;Decreased cognition;Decreased endurance;Decreased balance;Decreased high-level IADLs  Assessment: Pt completed STS w/ SS MAX A x2 from recliner and had mild BM while standing. Pt completed 2 more STS from SS pads for rear maximino care MOD A x2. Pt able to statically  SS w/ MIN A x1 but was in forward flex
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
## 2024-03-13 NOTE — CARE COORDINATION
TRansitional planning    Received VM from Britany [phone] at [facility] that they can accept patient.     1200 per patient RN patient is now agreeable to heart cath. Plan for heart cath today.     1217 Called Britany and left VM that patient is supposed to get a heart cath today.     1610 Called Britany. She has 3 open beds now and expects 2 more to open up on Friday. CM told her niece Yari hopes to get him in there and transition him to AL at [facility]. Plan for heart cath is now for tomorrow.

## 2024-03-13 NOTE — PROGRESS NOTES
Physical Therapy  Facility/Department: [facility] CAR 2- STEPDOWN  Physical Therapy Daily Treatment Note     Name: Michael Mcghee  : 1935  MRN: [id number]  Date of Service: 3/13/2024    Discharge Recommendations:  Patient would benefit from continued therapy after discharge   PT Equipment Recommendations  Equipment Needed: No      Patient Diagnosis(es): The primary encounter diagnosis was Leg swelling. Diagnoses of New onset of congestive heart failure (HCC), SOB (shortness of breath), and Pneumonia of right lower lobe due to infectious organism were also pertinent to this visit.  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  Past Surgical History:  has a past surgical history that includes Ankle surgery (Bilateral); Cardiac surgery; Cardiac surgery; joint replacement (Right); and Nerve Block (14).    Assessment   Body Structures, Functions, Activity Limitations Requiring Skilled Therapeutic Intervention: Decreased functional mobility ;Decreased strength;Decreased endurance;Decreased balance  Assessment: Pt required maxA x2 for STS transfer x3 using the SS. Pt demos a standing tolerance of 3 mins before requiring a seated rest break. Pt is unsafe to ambulate at this time due to poor standing balance. Pt is currently unsafe to return to prior living arrangements

## 2024-03-13 NOTE — PROGRESS NOTES
SUBJECTIVE    Patient was seen and examined.  Creatinine continues to improve and most recent creatinine is 1.1 mg/dL.  I was told by the nurses that cardiology wants to do a heart catheterization tomorrow.  With improvement in creatinine now his GFR is greater than 60.  He had mild to moderate risk for contrast-induced nephropathy.  Renal risk associated with cardiac catheterization explained to him in length which include but not limited to worsening of underlying kidney function, renal failure, need for dialysis.  Patient understood the risk and say yes to cardiac catheterization.  Patient denies any increase in shortness of breath.    OBJECTIVE      CURRENT TEMPERATURE:  Temp: 98.4 °F (36.9 °C)  MAXIMUM TEMPERATURE OVER 24HRS:  Temp (24hrs), Av.9 °F (36.6 °C), Min:97.7 °F (36.5 °C), Max:98.4 °F (36.9 °C)    CURRENT RESPIRATORY RATE:  Respirations: 19  CURRENT PULSE:  Pulse: 84  CURRENT BLOOD PRESSURE:  BP: (!) 145/73  24HR BLOOD PRESSURE RANGE:  Systolic (24hrs), Av , Min:101 , Max:149   ; Diastolic (24hrs), Av, Min:52, Max:73    24HR INTAKE/OUTPUT:    Intake/Output Summary (Last 24 hours) at 3/13/2024 1546  Last data filed at 3/13/2024 0656  Gross per 24 hour   Intake 400 ml   Output 1450 ml   Net -1050 ml     WEIGHT :Patient Vitals for the past 96 hrs (Last 3 readings):   Weight   03/10/24 0559 95 kg (209 lb 7 oz)     PHYSICAL EXAM    GENERAL APPEARANCE: Awake and alert patient was hard of hearing.  SKIN: Warm to touch and no erythema  NECK: No JVD.  PULMONARY: Bilateral air entry and clear  CADRDIOVASCULAR: S1 and S2 audible no S3   ABDOMEN: soft nontender, bowel sounds present, no organomegaly,  no ascites EXTREMITIES: No edema  CURRENT MEDICATIONS      metoprolol succinate (TOPROL XL) extended release tablet 50 mg, Daily  hypromellose (ISOPTO TEARS) 0.5 % ophthalmic solution 1 drop, PRN  heparin (porcine) injection 5,000 Units, 3 times per day  [Held by provider] furosemide (LASIX) tablet  20 mg, Daily  [Held by provider] losartan (COZAAR) tablet 25 mg, Daily  rosuvastatin (CRESTOR) tablet 10 mg, Daily  guaiFENesin (MUCINEX) extended release tablet 600 mg, BID  aspirin EC tablet 81 mg, Daily  tamsulosin (FLOMAX) capsule 0.4 mg, Daily  sodium chloride flush 0.9 % injection 5-40 mL, 2 times per day  sodium chloride flush 0.9 % injection 10 mL, PRN  0.9 % sodium chloride infusion, PRN  ondansetron (ZOFRAN-ODT) disintegrating tablet 4 mg, Q8H PRN   Or  ondansetron (ZOFRAN) injection 4 mg, Q6H PRN  polyethylene glycol (GLYCOLAX) packet 17 g, Daily PRN  acetaminophen (TYLENOL) tablet 650 mg, Q6H PRN   Or  acetaminophen (TYLENOL) suppository 650 mg, Q6H PRN  potassium chloride (KLOR-CON M) extended release tablet 40 mEq, PRN   Or  potassium bicarb-citric acid (EFFER-K) effervescent tablet 40 mEq, PRN   Or  potassium chloride 10 mEq/100 mL IVPB (Peripheral Line), PRN  magnesium sulfate 2000 mg in 50 mL IVPB premix, PRN  albuterol (PROVENTIL) (2.5 MG/3ML) 0.083% nebulizer solution 2.5 mg, Q6H PRN          LABS      CBC:   Recent Labs     03/11/24  0609   WBC 9.1   RBC 4.46   HGB 13.6   HCT 43.3   MCV 97.1   MCH 30.5   MCHC 31.4   RDW 12.9      MPV 10.2      BMP:   Recent Labs     03/12/24  0808 03/12/24  1802 03/13/24  0626    136 137   K 4.0 3.9 3.9   CL 96* 98 99   CO2 31 24 27   BUN 53* 52* 44*   CREATININE 1.5* 1.2 1.1   GLUCOSE 95 128* 97   CALCIUM 8.9 8.6 8.8      Recent Labs     03/11/24  0609 03/12/24  0808 03/13/24  0626   MG 2.4 2.7* 2.7*   SPEP:   Lab Results   Component Value Date/Time    PROT 6.1 03/12/2024 08:08 AM    ALBCAL 3.6 03/12/2024 08:08 AM    ALBPCT 59 03/12/2024 08:08 AM    LABALPH 0.2 03/12/2024 08:08 AM    LABALPH 0.7 03/12/2024 08:08 AM    A1PCT 3 03/12/2024 08:08 AM    A2PCT 12 03/12/2024 08:08 AM    BETAPCT 10 03/12/2024 08:08 AM    GAMGLOB 1.0 03/12/2024 08:08 AM    GGPCT 16 03/12/2024 08:08 AM    PATH PENDING 03/12/2024 08:08 AM   URINE SODIUM:    Lab Results

## 2024-03-13 NOTE — PROGRESS NOTES
ventricular systolic function with a visually estimated EF of 30 - 35%. Left ventricle size is normal. Normal wall thickness. Global hypokinesis present. Grade I diastolic dysfunction with normal LAP.    Right Ventricle: Right ventricle is mildly dilated.    Aortic Valve: Trileaflet valve. Mildly calcified cusp. Mild stenosis of the aortic valve. Velocities may be underestimated due to reduced systolic function. AV mean gradient is 13 mmHg. AV area by continuity VTI is 1.0 cm2.    Mitral Valve: Mild regurgitation.    Tricuspid Valve: Normal RVSP. The estimated RVSP is 23 mmHg.    Left Atrium: Left atrium is mildly dilated.    Right Atrium: Right atrium is mildly dilated.    Image quality is technically difficult.  Objective:   Vitals: BP (!) 145/73   Pulse 84   Temp 98.4 °F (36.9 °C) (Oral)   Resp 19   Wt 95 kg (209 lb 7 oz)   SpO2 100%   BMI 30.05 kg/m²   General appearance: alert and cooperative with exam  HEENT: Head: Normocephalic, no lesions, without obvious abnormality.  Neck: no JVD, trachea midline, no adenopathy  Lungs: diminished to auscultation  Heart: Regular rate and rhythm, s1/s2 auscultated, no murmurs  Abdomen: soft, non-tender, bowel sounds active  Extremities: no edema  Neurologic: not done        Assessment / Acute Cardiac Problems:   Acute new onset CHF, unspecified  Minimal elevated troponin with a flat trend likely due to CHF  CAD s/p CABG in 1990, records not available  Essential hypertension  Hyperlipidemia  Remote hx of subdural hematoma s/p evacuation      Patient Active Problem List:     Spinal stenosis, lumbar region, without neurogenic claudication     Arthropathy, unspecified, other specified sites     CAD (coronary artery disease)     Hypertension     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 (HCC)     Benign hypertensive heart disease without congestive heart failure     Chronic ischemic heart disease     Hyperlipidemia     Mitral valve  disease     Obesity     Presence of aortocoronary bypass graft     Presence of coronary angioplasty implant and graft     Pure hypercholesterolemia     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 Will need Nephro clearance prior to Cardiac cath.   Continue ASA, statin, BB  Volume management/diuresis   per nephrology     Electronically signed by KINDRA Reyes NP on 3/13/2024 at 12:40 PM  [facility]  [phone]

## 2024-03-14 PROBLEM — T24.012A: Status: ACTIVE | Noted: 2024-03-14

## 2024-03-14 PROBLEM — I50.9 CONGESTIVE HEART FAILURE (CHF) (HCC): Status: ACTIVE | Noted: 2024-03-08

## 2024-03-14 LAB
ALBUMIN PERCENT: 59 % (ref 45–65)
ALBUMIN SERPL-MCNC: 3.6 G/DL (ref 3.2–5.2)
ALPHA 2 PERCENT: 12 % (ref 6–13)
ALPHA1 GLOB SERPL ELPH-MCNC: 0.2 G/DL (ref 0.1–0.4)
ALPHA1 GLOB SERPL ELPH-MCNC: 3 % (ref 3–6)
ALPHA2 GLOB SERPL ELPH-MCNC: 0.7 G/DL (ref 0.5–0.9)
ANA SER QL IA: NEGATIVE
ANION GAP SERPL CALCULATED.3IONS-SCNC: 11 MMOL/L (ref 9–16)
B-GLOBULIN SERPL ELPH-MCNC: 0.6 G/DL (ref 0.5–1.1)
B-GLOBULIN SERPL ELPH-MCNC: 10 % (ref 11–19)
BASOPHILS # BLD: <0.03 K/UL (ref 0–0.2)
BASOPHILS NFR BLD: 0 % (ref 0–2)
BUN SERPL-MCNC: 43 MG/DL (ref 8–23)
C3 SERPL-MCNC: 149 MG/DL (ref 90–180)
C4 SERPL-MCNC: 29 MG/DL (ref 10–40)
CALCIUM SERPL-MCNC: 9 MG/DL (ref 8.6–10.4)
CHLORIDE SERPL-SCNC: 101 MMOL/L (ref 98–107)
CO2 SERPL-SCNC: 27 MMOL/L (ref 20–31)
CREAT SERPL-MCNC: 1 MG/DL (ref 0.7–1.2)
DSDNA IGG SER QL IA: <0.5 IU/ML
EOSINOPHIL # BLD: 0.13 K/UL (ref 0–0.44)
EOSINOPHILS RELATIVE PERCENT: 2 % (ref 1–4)
ERYTHROCYTE [DISTWIDTH] IN BLOOD BY AUTOMATED COUNT: 12.4 % (ref 11.8–14.4)
GAMMA GLOB SERPL ELPH-MCNC: 1 G/DL (ref 0.5–1.5)
GAMMA GLOBULIN %: 16 % (ref 9–20)
GFR SERPL CREATININE-BSD FRML MDRD: >60 ML/MIN/1.73M2
GLUCOSE SERPL-MCNC: 97 MG/DL (ref 74–99)
HCT VFR BLD AUTO: 42.3 % (ref 40.7–50.3)
HGB BLD-MCNC: 13.2 G/DL (ref 13–17)
IMM GRANULOCYTES # BLD AUTO: <0.03 K/UL (ref 0–0.3)
IMM GRANULOCYTES NFR BLD: 0 %
INTERPRETATION SERPL IFE-IMP: NORMAL
LYMPHOCYTES NFR BLD: 1.47 K/UL (ref 1.1–3.7)
LYMPHOCYTES RELATIVE PERCENT: 22 % (ref 24–43)
MCH RBC QN AUTO: 29.8 PG (ref 25.2–33.5)
MCHC RBC AUTO-ENTMCNC: 31.2 G/DL (ref 28.4–34.8)
MCV RBC AUTO: 95.5 FL (ref 82.6–102.9)
MONOCYTES NFR BLD: 0.74 K/UL (ref 0.1–1.2)
MONOCYTES NFR BLD: 11 % (ref 3–12)
NEUTROPHILS NFR BLD: 65 % (ref 36–65)
NEUTS SEG NFR BLD: 4.18 K/UL (ref 1.5–8.1)
NRBC BLD-RTO: 0 PER 100 WBC
NUCLEAR IGG SER IA-RTO: 0.1 U/ML
P E INTERPRETATION, U: NORMAL
PATH REV: NORMAL
PATHOLOGIST: ABNORMAL
PATHOLOGIST: NORMAL
PLATELET # BLD AUTO: 188 K/UL (ref 138–453)
PMV BLD AUTO: 10.3 FL (ref 8.1–13.5)
POTASSIUM SERPL-SCNC: 4 MMOL/L (ref 3.7–5.3)
PROT PATTERN SERPL ELPH-IMP: ABNORMAL
PROT SERPL-MCNC: 6.1 G/DL (ref 6.6–8.7)
RBC # BLD AUTO: 4.43 M/UL (ref 4.21–5.77)
SODIUM SERPL-SCNC: 139 MMOL/L (ref 136–145)
SPECIMEN TYPE: NORMAL
TOTAL PROT. SUM,%: 100 % (ref 98–102)
TOTAL PROT. SUM: 6.1 G/DL (ref 6.3–8.2)
URINE TOTAL PROTEIN: 53 MG/DL
WBC OTHER # BLD: 6.6 K/UL (ref 3.5–11.3)

## 2024-03-14 PROCEDURE — 6370000000 HC RX 637 (ALT 250 FOR IP): Performed by: STUDENT IN AN ORGANIZED HEALTH CARE EDUCATION/TRAINING PROGRAM

## 2024-03-14 PROCEDURE — 2060000000 HC ICU INTERMEDIATE R&B

## 2024-03-14 PROCEDURE — 6370000000 HC RX 637 (ALT 250 FOR IP): Performed by: NURSE PRACTITIONER

## 2024-03-14 PROCEDURE — 6360000002 HC RX W HCPCS

## 2024-03-14 PROCEDURE — 2580000003 HC RX 258: Performed by: NURSE PRACTITIONER

## 2024-03-14 PROCEDURE — 85025 COMPLETE CBC W/AUTO DIFF WBC: CPT

## 2024-03-14 PROCEDURE — 99221 1ST HOSP IP/OBS SF/LOW 40: CPT | Performed by: SURGERY

## 2024-03-14 PROCEDURE — 6360000002 HC RX W HCPCS: Performed by: STUDENT IN AN ORGANIZED HEALTH CARE EDUCATION/TRAINING PROGRAM

## 2024-03-14 PROCEDURE — 99232 SBSQ HOSP IP/OBS MODERATE 35: CPT | Performed by: STUDENT IN AN ORGANIZED HEALTH CARE EDUCATION/TRAINING PROGRAM

## 2024-03-14 PROCEDURE — 80048 BASIC METABOLIC PNL TOTAL CA: CPT

## 2024-03-14 PROCEDURE — 2W2PX4Z DRESSING OF LEFT UPPER LEG USING BANDAGE: ICD-10-PCS | Performed by: SURGERY

## 2024-03-14 PROCEDURE — 2580000003 HC RX 258: Performed by: INTERNAL MEDICINE

## 2024-03-14 PROCEDURE — 2500000003 HC RX 250 WO HCPCS

## 2024-03-14 PROCEDURE — 99232 SBSQ HOSP IP/OBS MODERATE 35: CPT | Performed by: INTERNAL MEDICINE

## 2024-03-14 PROCEDURE — 36415 COLL VENOUS BLD VENIPUNCTURE: CPT

## 2024-03-14 PROCEDURE — 6360000002 HC RX W HCPCS: Performed by: INTERNAL MEDICINE

## 2024-03-14 RX ORDER — SODIUM CHLORIDE 9 MG/ML
INJECTION, SOLUTION INTRAVENOUS CONTINUOUS
Status: ACTIVE | OUTPATIENT
Start: 2024-03-14 | End: 2024-03-14

## 2024-03-14 RX ORDER — ACETYLCYSTEINE 200 MG/ML
600 SOLUTION ORAL; RESPIRATORY (INHALATION) 2 TIMES DAILY
Status: DISCONTINUED | OUTPATIENT
Start: 2024-03-14 | End: 2024-03-14

## 2024-03-14 RX ORDER — FENTANYL CITRATE 50 UG/ML
25 INJECTION, SOLUTION INTRAMUSCULAR; INTRAVENOUS ONCE
Status: COMPLETED | OUTPATIENT
Start: 2024-03-14 | End: 2024-03-14

## 2024-03-14 RX ADMIN — SILVER SULFADIAZINE: 10 CREAM TOPICAL at 08:55

## 2024-03-14 RX ADMIN — SILVER SULFADIAZINE: 10 CREAM TOPICAL at 20:56

## 2024-03-14 RX ADMIN — SODIUM CHLORIDE, PRESERVATIVE FREE 10 ML: 5 INJECTION INTRAVENOUS at 20:55

## 2024-03-14 RX ADMIN — METOPROLOL SUCCINATE 50 MG: 25 TABLET, EXTENDED RELEASE ORAL at 08:54

## 2024-03-14 RX ADMIN — ACETAMINOPHEN 650 MG: 325 TABLET ORAL at 08:53

## 2024-03-14 RX ADMIN — GUAIFENESIN 600 MG: 600 TABLET, EXTENDED RELEASE ORAL at 08:54

## 2024-03-14 RX ADMIN — HEPARIN SODIUM 5000 UNITS: 5000 INJECTION INTRAVENOUS; SUBCUTANEOUS at 02:54

## 2024-03-14 RX ADMIN — SODIUM CHLORIDE: 9 INJECTION, SOLUTION INTRAVENOUS at 06:18

## 2024-03-14 RX ADMIN — ASPIRIN 81 MG: 81 TABLET, COATED ORAL at 08:54

## 2024-03-14 RX ADMIN — FENTANYL CITRATE 25 MCG: 50 INJECTION INTRAMUSCULAR; INTRAVENOUS at 01:55

## 2024-03-14 RX ADMIN — GUAIFENESIN 600 MG: 600 TABLET, EXTENDED RELEASE ORAL at 20:56

## 2024-03-14 RX ADMIN — ROSUVASTATIN CALCIUM 10 MG: 10 TABLET, COATED ORAL at 09:08

## 2024-03-14 RX ADMIN — HEPARIN SODIUM 5000 UNITS: 5000 INJECTION INTRAVENOUS; SUBCUTANEOUS at 17:57

## 2024-03-14 RX ADMIN — TAMSULOSIN HYDROCHLORIDE 0.4 MG: 0.4 CAPSULE ORAL at 08:54

## 2024-03-14 RX ADMIN — SILVER SULFADIAZINE: 10 CREAM TOPICAL at 03:28

## 2024-03-14 RX ADMIN — Medication 600 MG: at 11:07

## 2024-03-14 ASSESSMENT — PAIN DESCRIPTION - ORIENTATION
ORIENTATION: POSTERIOR
ORIENTATION: LEFT
ORIENTATION: POSTERIOR
ORIENTATION: LEFT

## 2024-03-14 ASSESSMENT — ENCOUNTER SYMPTOMS
VOMITING: 0
APNEA: 0
CHEST TIGHTNESS: 0
NAUSEA: 0
ABDOMINAL PAIN: 0

## 2024-03-14 ASSESSMENT — PAIN SCALES - GENERAL
PAINLEVEL_OUTOF10: 9
PAINLEVEL_OUTOF10: 3
PAINLEVEL_OUTOF10: 3
PAINLEVEL_OUTOF10: 2

## 2024-03-14 ASSESSMENT — PAIN DESCRIPTION - LOCATION
LOCATION: HIP;LEG
LOCATION: LEG
LOCATION: LEG
LOCATION: FOOT

## 2024-03-14 ASSESSMENT — PAIN DESCRIPTION - DESCRIPTORS: DESCRIPTORS: BURNING

## 2024-03-14 NOTE — PROGRESS NOTES
Congestive Heart Failure Education completed and charted. CHF booklet given. Patient was mildly receptive to education.    Discussed the  importance of medication compliance.    Discussed the importance of a heart healthy diet. Discussed 2000 mg sodium-restricted daily diet.  Patient instructed to limit fluid intake to  1.5 to 2 liters per day.    Patient instructed to weigh self at the same time of each day each morning, reinforced teaching to monitor for 3-5 lb weight increase over 1-2 days notify physician if change noted.      Signs and symptoms of CHF discussed with patient, such as feeling more tired than normal, feeling short of breath, coughing that increases when lying down, sudden weight gain, swelling of the feet, legs or belly.  Patient verbalized understanding to notify physician office if these symptoms occur.  EF 30-35%

## 2024-03-14 NOTE — PLAN OF CARE
The patient does not want any procedure. He is A&O x4, and he does understand that his EF is low and we need to look into his coronaries to rule out any ischemic cause. We will cancel the procedure and talk to him again in the morning to see if he would agree. Discussed with Dr.Kanaan Florez  CV fellow
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
## 2024-03-14 NOTE — PROGRESS NOTES
SUBJECTIVE    Patient was seen and examined.  Patient was lying flat and comfortable.  He is for cardiac catheterization today.  He has a good urine output in last 24 hours.  There is no creatinine available from today.    OBJECTIVE      CURRENT TEMPERATURE:  Temp: 98.4 °F (36.9 °C)  MAXIMUM TEMPERATURE OVER 24HRS:  Temp (24hrs), Av.9 °F (36.6 °C), Min:97.7 °F (36.5 °C), Max:98.4 °F (36.9 °C)    CURRENT RESPIRATORY RATE:  Respirations: 19  CURRENT PULSE:  Pulse: 84  CURRENT BLOOD PRESSURE:  BP: (!) 145/73  24HR BLOOD PRESSURE RANGE:  Systolic (24hrs), Av , Min:101 , Max:149   ; Diastolic (24hrs), Av, Min:52, Max:73    24HR INTAKE/OUTPUT:    Intake/Output Summary (Last 24 hours) at 3/13/2024 1546  Last data filed at 3/13/2024 0656  Gross per 24 hour   Intake 400 ml   Output 1450 ml   Net -1050 ml     WEIGHT :Patient Vitals for the past 96 hrs (Last 3 readings):   Weight   03/10/24 0559 95 kg (209 lb 7 oz)     PHYSICAL EXAM    GENERAL APPEARANCE: Awake and alert x 3.  SKIN: Warm to touch and no erythema  NECK: Prominent neck vein  PULMONARY: Bilateral air entry and clear  CADRDIOVASCULAR: S1 and S2 audible no S3   ABDOMEN: soft nontender, bowel sounds present, no organomegaly,  no ascites EXTREMITIES: No edema  CURRENT MEDICATIONS      metoprolol succinate (TOPROL XL) extended release tablet 50 mg, Daily  hypromellose (ISOPTO TEARS) 0.5 % ophthalmic solution 1 drop, PRN  heparin (porcine) injection 5,000 Units, 3 times per day  [Held by provider] furosemide (LASIX) tablet 20 mg, Daily  [Held by provider] losartan (COZAAR) tablet 25 mg, Daily  rosuvastatin (CRESTOR) tablet 10 mg, Daily  guaiFENesin (MUCINEX) extended release tablet 600 mg, BID  aspirin EC tablet 81 mg, Daily  tamsulosin (FLOMAX) capsule 0.4 mg, Daily  sodium chloride flush 0.9 % injection 5-40 mL, 2 times per day  sodium chloride flush 0.9 % injection 10 mL, PRN  0.9 % sodium chloride infusion, PRN  ondansetron (ZOFRAN-ODT)     SPECGRAV 1.019 03/13/2024 10:47 AM    LEUKOCYTESUR NEGATIVE 03/13/2024 10:47 AM    UROBILINOGEN Normal 03/13/2024 10:47 AM    BILIRUBINUR NEGATIVE 03/13/2024 10:47 AM    GLUCOSEU NEGATIVE 03/13/2024 10:47 AM    KETUA NEGATIVE 03/13/2024 10:47 AM     RADIOLOGY    EXAMINATION:  ULTRASOUND OF THE KIDNEYS     3/12/2024 7:18 pm     COMPARISON:  CT abdomen 08/19/2023     HISTORY:  ORDERING SYSTEM PROVIDED HISTORY: NADIR.  TECHNOLOGIST PROVIDED HISTORY:  NADIR.     FINDINGS:  The right kidney measures 10.2 cm in length and the left kidney measures 10.1  cm in length.     Kidneys demonstrate normal cortical echogenicity.  No hydronephrosis or  intrarenal stones.  No focal lesions.  Incidental right renal cyst measures  8.5 mm.     IMPRESSION:  Unremarkable ultrasound of the kidneys.    Assessment:     Acute kidney injury due to prerenal azotemia and creatinine improved most recent creatinine which is a level from yesterday was 1.1 mg/dL  Shortness of breath with a drop in ejection fraction.  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  Cardiology is planning to do a heart catheterization today..  Coronary artery disease history of CABG in 1990.  Hypertension.  BPH.  Plan:   BMP today  Stop IV fluid 4-hour after heart catheterization  Will follow with you    Nutrition       Thank you for the consultation. Please do not hesitate to contact us for any further questions/concerns.     Alvarez Griggs MD  [facility]     This note is created with the assistance of a speech-recognition program. While intending to generate a document that actually reflects the content of the visit, no guarantees can be provided that every mistake has been identified and corrected by editing.
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
## 2024-03-14 NOTE — PLAN OF CARE
Problem: Safety - Adult  Goal: Free from fall injury  Outcome: Progressing  Flowsheets (Taken 3/14/2024 0830)  Free From Fall Injury: Instruct family/caregiver on patient safety     Problem: Discharge Planning  Goal: Discharge to home or other facility with appropriate resources  Outcome: Progressing  Flowsheets (Taken 3/14/2024 0830)  Discharge to home or other facility with appropriate resources:   Identify barriers to discharge with patient and caregiver   Arrange for needed discharge resources and transportation as appropriate   Identify discharge learning needs (meds, wound care, etc)     Problem: Skin/Tissue Integrity  Goal: Absence of new skin breakdown  Description: 1.  Monitor for areas of redness and/or skin breakdown  2.  Assess vascular access sites hourly  3.  Every 4-6 hours minimum:  Change oxygen saturation probe site  4.  Every 4-6 hours:  If on nasal continuous positive airway pressure, respiratory therapy assess nares and determine need for appliance change or resting period.  Outcome: Progressing     Problem: ABCDS Injury Assessment  Goal: Absence of physical injury  Outcome: Progressing  Flowsheets (Taken 3/14/2024 0830)  Absence of Physical Injury: Implement safety measures based on patient assessment     Problem: Chronic Conditions and Co-morbidities  Goal: Patient's chronic conditions and co-morbidity symptoms are monitored and maintained or improved  Outcome: Progressing  Flowsheets (Taken 3/14/2024 0830)  Care Plan - Patient's Chronic Conditions and Co-Morbidity Symptoms are Monitored and Maintained or Improved:   Monitor and assess patient's chronic conditions and comorbid symptoms for stability, deterioration, or improvement   Collaborate with multidisciplinary team to address chronic and comorbid conditions and prevent exacerbation or deterioration   Update acute care plan with appropriate goals if chronic or comorbid symptoms are exacerbated and prevent overall improvement and

## 2024-03-14 NOTE — PROCEDURES
Date Procedure started:         3/14/24                               Time Procedure started:         0200                              Location Completed:    X    Bedside  _ Tubbing Room  Medication Given:       25mg Fentanyl                                     Photos Taken       Before and after                                                       Please richa dressing applied to OR debrided injuries/ skin to all areas that apply below:     S=Silvadene    B=Bacitracin    M= Mepilex    F= Furacin        MORLEY=Santyl                             SUL=Sulfamylon     D=Donor site/xeroform    E=Eucerin    O=Other (specify below)  DRESSING APPLICATION/ CHANGE DEBRIDEMENT      (Y/N) BODY LOCATION   HEAD, FACE AND NECK         N SCALP     N RT EAR     N LT EAR     N NECK      N FACE          N CHEST      N ABDOMEN      N BACK    S  Y BUTTOCK      N GENITALIA      N PERINEUM          N RT UPPER ARM (Includes Shoulder)      N LT UPPER ARM (Includes Shoulder)      N RT LOWER ARM (Includes Elbow or Wrist)      N LT LOWER ARM (Includes Elbow or Wrist)      N RT HAND (Includes Fingers)      N LT HAND (Includes Fingers)          N RT UPPER LEG (Includes Hip)   S Y LT UPPER LEG (Includes Hip)    N RT LOWER LEG (Includes Knee)    N LT LOWER LEG (Includes Knee)      N RT FOOT (Includes Ankles or Toes)      N LT FOOT (Includes Ankles or Toes)      ADDITIONAL NOTES: None    Electronically signed by Ruth Puga DO on 3/14/2024 at 3:10 AM

## 2024-03-14 NOTE — CONSULTS
components within normal limits   BASIC METABOLIC PANEL - Abnormal; Notable for the following components:    Chloride 97 (*)     All other components within normal limits   BRAIN NATRIURETIC PEPTIDE - Abnormal; Notable for the following components:    Pro-BNP 1,244 (*)     All other components within normal limits   BASIC METABOLIC PANEL - Abnormal; Notable for the following components:    Chloride 96 (*)     CO2 32 (*)     BUN 34 (*)     Est, Glom Filt Rate 56 (*)     All other components within normal limits   CBC WITH AUTO DIFFERENTIAL - Abnormal; Notable for the following components:    Monocytes % 14 (*)     All other components within normal limits   CBC WITH AUTO DIFFERENTIAL - Abnormal; Notable for the following components:    Monocytes % 13 (*)     All other components within normal limits   BRAIN NATRIURETIC PEPTIDE - Abnormal; Notable for the following components:    Pro- (*)     All other components within normal limits   BASIC METABOLIC PANEL - Abnormal; Notable for the following components:    Chloride 97 (*)     CO2 32 (*)     Glucose 100 (*)     BUN 41 (*)     Creatinine 1.4 (*)     Est, Glom Filt Rate 48 (*)     All other components within normal limits   MAGNESIUM - Abnormal; Notable for the following components:    Magnesium 2.7 (*)     All other components within normal limits   BASIC METABOLIC PANEL - Abnormal; Notable for the following components:    Chloride 96 (*)     BUN 53 (*)     Creatinine 1.5 (*)     Est, Glom Filt Rate 44 (*)     All other components within normal limits   BASIC METABOLIC PANEL W/ REFLEX TO MG FOR LOW K - Abnormal; Notable for the following components:    Glucose 128 (*)     BUN 52 (*)     All other components within normal limits   URINALYSIS WITH MICROSCOPIC - Abnormal; Notable for the following components:    Protein, UA 1+ (*)     All other components within normal limits   KAPPA/LAMBDA QUANTITATIVE FREE LIGHT CHAINS, SERUM - Abnormal; Notable for the  following components:    Kappa Free Light Chains QNT 90.2 (*)     Lambda Free Light Chains QNT 56.5 (*)     All other components within normal limits   ELECTROPHORESIS PROTEIN, SERUM - Abnormal; Notable for the following components:    Total Protein 6.1 (*)     Beta Percent 10 (*)     Total Prot. Sum 6.1 (*)     All other components within normal limits   MAGNESIUM - Abnormal; Notable for the following components:    Magnesium 2.7 (*)     All other components within normal limits   BASIC METABOLIC PANEL - Abnormal; Notable for the following components:    BUN 44 (*)     All other components within normal limits   HEPATIC FUNCTION PANEL   PROCALCITONIN   CBC WITH AUTO DIFFERENTIAL   LIPID PANEL   MAGNESIUM   TSH   MAGNESIUM   MAGNESIUM   CHLORIDE, URINE, RANDOM   CREATININE, RANDOM URINE   PROTEIN, URINE, RANDOM   SODIUM, URINE, RANDOM   C3 COMPLEMENT   C4 COMPLEMENT   IMMUNOFIXATION SERUM PROFILE   PROTEIN ELECTROPHORESIS, URINE   MISTY SCREEN WITH REFLEX   HEPATITIS PANEL, ACUTE   BASIC METABOLIC PANEL W/ REFLEX TO MG FOR LOW K         Ruth Puga, DO  3/14/24, 2:52 AM  Attestation signed by Leo Gray MD    I personally evaluated the patient and directed the medical decision making with Resident/NAFISA after the physical/radiologic exam and laboratory values were reviewed and confirmed.  Debrided, instruct in wound care.  Likely sign off.     Leo Gray MD

## 2024-03-15 PROBLEM — I25.5 ISCHEMIC CARDIOMYOPATHY: Status: ACTIVE | Noted: 2024-03-15

## 2024-03-15 LAB
ANION GAP SERPL CALCULATED.3IONS-SCNC: 10 MMOL/L (ref 9–16)
BODY TEMPERATURE: 37
BUN SERPL-MCNC: 29 MG/DL (ref 8–23)
CALCIUM SERPL-MCNC: 9.3 MG/DL (ref 8.6–10.4)
CHLORIDE SERPL-SCNC: 103 MMOL/L (ref 98–107)
CO2 SERPL-SCNC: 26 MMOL/L (ref 20–31)
COHGB MFR BLD: 0.9 % (ref 0–5)
CREAT SERPL-MCNC: 0.9 MG/DL (ref 0.7–1.2)
FIO2 ON VENT: ABNORMAL %
GFR SERPL CREATININE-BSD FRML MDRD: >60 ML/MIN/1.73M2
GLUCOSE BLD-MCNC: 82 MG/DL (ref 75–110)
GLUCOSE SERPL-MCNC: 106 MG/DL (ref 74–99)
HCO3 VENOUS: 30.3 MMOL/L (ref 24–30)
O2 SAT, VEN: 67.7 % (ref 60–85)
PCO2, VEN: 56.7 MM HG (ref 39–55)
PH VENOUS: 7.35 (ref 7.32–7.42)
PO2, VEN: 35.4 MM HG (ref 30–50)
POSITIVE BASE EXCESS, VEN: 3.6 MMOL/L (ref 0–2)
POTASSIUM SERPL-SCNC: 4.6 MMOL/L (ref 3.7–5.3)
SODIUM SERPL-SCNC: 139 MMOL/L (ref 136–145)
SPECIMEN TYPE: NORMAL
SPECIMEN VOL UR: NORMAL ML
URINE IFX INTERP: NORMAL
URINE TOTAL PROTEIN: 53 MG/DL

## 2024-03-15 PROCEDURE — 97530 THERAPEUTIC ACTIVITIES: CPT

## 2024-03-15 PROCEDURE — 4A023N7 MEASUREMENT OF CARDIAC SAMPLING AND PRESSURE, LEFT HEART, PERCUTANEOUS APPROACH: ICD-10-PCS | Performed by: INTERNAL MEDICINE

## 2024-03-15 PROCEDURE — 99232 SBSQ HOSP IP/OBS MODERATE 35: CPT | Performed by: INTERNAL MEDICINE

## 2024-03-15 PROCEDURE — B2111ZZ FLUOROSCOPY OF MULTIPLE CORONARY ARTERIES USING LOW OSMOLAR CONTRAST: ICD-10-PCS | Performed by: INTERNAL MEDICINE

## 2024-03-15 PROCEDURE — 2060000000 HC ICU INTERMEDIATE R&B

## 2024-03-15 PROCEDURE — B2151ZZ FLUOROSCOPY OF LEFT HEART USING LOW OSMOLAR CONTRAST: ICD-10-PCS | Performed by: INTERNAL MEDICINE

## 2024-03-15 PROCEDURE — 99232 SBSQ HOSP IP/OBS MODERATE 35: CPT | Performed by: STUDENT IN AN ORGANIZED HEALTH CARE EDUCATION/TRAINING PROGRAM

## 2024-03-15 PROCEDURE — 36415 COLL VENOUS BLD VENIPUNCTURE: CPT

## 2024-03-15 PROCEDURE — B2181ZZ FLUOROSCOPY OF LEFT INTERNAL MAMMARY BYPASS GRAFT USING LOW OSMOLAR CONTRAST: ICD-10-PCS | Performed by: INTERNAL MEDICINE

## 2024-03-15 PROCEDURE — 93455 CORONARY ART/GRFT ANGIO S&I: CPT | Performed by: INTERNAL MEDICINE

## 2024-03-15 PROCEDURE — 6360000004 HC RX CONTRAST MEDICATION: Performed by: INTERNAL MEDICINE

## 2024-03-15 PROCEDURE — 80048 BASIC METABOLIC PNL TOTAL CA: CPT

## 2024-03-15 PROCEDURE — 99233 SBSQ HOSP IP/OBS HIGH 50: CPT | Performed by: SURGERY

## 2024-03-15 PROCEDURE — 82947 ASSAY GLUCOSE BLOOD QUANT: CPT

## 2024-03-15 PROCEDURE — 6370000000 HC RX 637 (ALT 250 FOR IP): Performed by: NURSE PRACTITIONER

## 2024-03-15 PROCEDURE — 2500000003 HC RX 250 WO HCPCS: Performed by: INTERNAL MEDICINE

## 2024-03-15 PROCEDURE — B2131ZZ FLUOROSCOPY OF MULTIPLE CORONARY ARTERY BYPASS GRAFTS USING LOW OSMOLAR CONTRAST: ICD-10-PCS | Performed by: INTERNAL MEDICINE

## 2024-03-15 PROCEDURE — 6360000002 HC RX W HCPCS: Performed by: INTERNAL MEDICINE

## 2024-03-15 PROCEDURE — 2580000003 HC RX 258: Performed by: STUDENT IN AN ORGANIZED HEALTH CARE EDUCATION/TRAINING PROGRAM

## 2024-03-15 PROCEDURE — 7100000010 HC PHASE II RECOVERY - FIRST 15 MIN: Performed by: INTERNAL MEDICINE

## 2024-03-15 PROCEDURE — 82805 BLOOD GASES W/O2 SATURATION: CPT

## 2024-03-15 PROCEDURE — 93459 L HRT ART/GRFT ANGIO: CPT | Performed by: INTERNAL MEDICINE

## 2024-03-15 PROCEDURE — 6370000000 HC RX 637 (ALT 250 FOR IP): Performed by: STUDENT IN AN ORGANIZED HEALTH CARE EDUCATION/TRAINING PROGRAM

## 2024-03-15 PROCEDURE — 2709999900 HC NON-CHARGEABLE SUPPLY: Performed by: INTERNAL MEDICINE

## 2024-03-15 PROCEDURE — 2580000003 HC RX 258: Performed by: NURSE PRACTITIONER

## 2024-03-15 PROCEDURE — 6360000002 HC RX W HCPCS: Performed by: STUDENT IN AN ORGANIZED HEALTH CARE EDUCATION/TRAINING PROGRAM

## 2024-03-15 PROCEDURE — 99152 MOD SED SAME PHYS/QHP 5/>YRS: CPT | Performed by: INTERNAL MEDICINE

## 2024-03-15 PROCEDURE — 97535 SELF CARE MNGMENT TRAINING: CPT

## 2024-03-15 RX ORDER — SODIUM CHLORIDE 0.9 % (FLUSH) 0.9 %
5-40 SYRINGE (ML) INJECTION EVERY 12 HOURS SCHEDULED
Status: DISCONTINUED | OUTPATIENT
Start: 2024-03-15 | End: 2024-03-16 | Stop reason: HOSPADM

## 2024-03-15 RX ORDER — SODIUM CHLORIDE 9 MG/ML
INJECTION, SOLUTION INTRAVENOUS PRN
Status: DISCONTINUED | OUTPATIENT
Start: 2024-03-15 | End: 2024-03-16 | Stop reason: HOSPADM

## 2024-03-15 RX ORDER — ACETAMINOPHEN 325 MG/1
650 TABLET ORAL EVERY 4 HOURS PRN
Status: DISCONTINUED | OUTPATIENT
Start: 2024-03-15 | End: 2024-03-16 | Stop reason: HOSPADM

## 2024-03-15 RX ORDER — SODIUM CHLORIDE 0.9 % (FLUSH) 0.9 %
5-40 SYRINGE (ML) INJECTION PRN
Status: DISCONTINUED | OUTPATIENT
Start: 2024-03-15 | End: 2024-03-16 | Stop reason: HOSPADM

## 2024-03-15 RX ORDER — MIDAZOLAM HYDROCHLORIDE 1 MG/ML
INJECTION INTRAMUSCULAR; INTRAVENOUS PRN
Status: DISCONTINUED | OUTPATIENT
Start: 2024-03-15 | End: 2024-03-15 | Stop reason: HOSPADM

## 2024-03-15 RX ORDER — FENTANYL CITRATE 50 UG/ML
INJECTION, SOLUTION INTRAMUSCULAR; INTRAVENOUS PRN
Status: DISCONTINUED | OUTPATIENT
Start: 2024-03-15 | End: 2024-03-15 | Stop reason: HOSPADM

## 2024-03-15 RX ADMIN — GUAIFENESIN 600 MG: 600 TABLET, EXTENDED RELEASE ORAL at 10:21

## 2024-03-15 RX ADMIN — SILVER SULFADIAZINE: 10 CREAM TOPICAL at 21:17

## 2024-03-15 RX ADMIN — SODIUM CHLORIDE, PRESERVATIVE FREE 10 ML: 5 INJECTION INTRAVENOUS at 21:17

## 2024-03-15 RX ADMIN — GUAIFENESIN 600 MG: 600 TABLET, EXTENDED RELEASE ORAL at 21:16

## 2024-03-15 RX ADMIN — ASPIRIN 81 MG: 81 TABLET, COATED ORAL at 10:21

## 2024-03-15 RX ADMIN — SILVER SULFADIAZINE: 10 CREAM TOPICAL at 10:20

## 2024-03-15 RX ADMIN — METOPROLOL SUCCINATE 50 MG: 25 TABLET, EXTENDED RELEASE ORAL at 10:21

## 2024-03-15 RX ADMIN — SODIUM CHLORIDE, PRESERVATIVE FREE 10 ML: 5 INJECTION INTRAVENOUS at 10:18

## 2024-03-15 RX ADMIN — TAMSULOSIN HYDROCHLORIDE 0.4 MG: 0.4 CAPSULE ORAL at 10:21

## 2024-03-15 RX ADMIN — ROSUVASTATIN CALCIUM 10 MG: 10 TABLET, COATED ORAL at 10:24

## 2024-03-15 NOTE — PROGRESS NOTES
Physical Therapy  Facility/Department: [facility] CAR 2- STEPDOWN  Physical Therapy Daily Treatment Note    Name: Michael Mcghee  : 1935  MRN: [id number]  Date of Service: 3/15/2024    Discharge Recommendations:  Patient would benefit from continued therapy after discharge   PT Equipment Recommendations  Equipment Needed: No      Patient Diagnosis(es): The primary encounter diagnosis was Leg swelling. Diagnoses of New onset of congestive heart failure (HCC), SOB (shortness of breath), Pneumonia of right lower lobe due to infectious organism, and Congestive heart failure (CHF) (HCC) were also pertinent to this visit.  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  Past Surgical History:  has a past surgical history that includes Ankle surgery (Bilateral); Cardiac surgery; Cardiac surgery; joint replacement (Right); Nerve Block (2014); and Cardiac catheterization (2024).    Assessment   Body Structures, Functions, Activity Limitations Requiring Skilled Therapeutic Intervention: Decreased functional mobility ;Decreased strength;Decreased endurance;Decreased balance  Assessment: Pt required maxA x2 for bed mobility with increased time to complete. Pt sat OEB for 8 mins with Naomie to maintain upright posture due to posterior right side lean. Pt  Score : 9  AM-PAC Inpatient T-Scale Score : 30.55  Mobility Inpatient CMS 0-100% Score: 81.38  Mobility Inpatient CMS G-Code Modifier : CM      Goals  Short Term Goals  Time Frame for Short Term Goals: 14 visits  Short Term Goal 1: Pt will be Cirilo in all bed mobility tasks  Short Term Goal 2: Pt will be Cirilo in transfers  Short Term Goal 3: Pt will amb 10' Cirilo w/ RW or least restrictive device  Short Term Goal 4: Pt will propel manual ' Cirilo  Short Term Goal 5: Pt will be IND in management of RW parts.  Additional Goals?: No       Education  Patient Education  Education Given To: Patient  Education Provided: Role of Therapy;Plan of Care  Education Method: Demonstration;Verbal  Barriers to Learning: Cognition  Education Outcome: Continued education needed;Verbalized understanding      Therapy Time   Individual Concurrent Group Co-treatment   Time In 0920         Time Out 0944         Minutes 24         Timed Code Treatment Minutes: 24 Minutes (cotreat with OT)   Co- treatment with OT warranted secondary to decreased patient safety and independence with functional mobility requiring skilled physical assistance of two professionals to simultaneously address individualized discipline goals. PT is addressing sitting/ standing balance , while OT is addressing their individualized functional mobility/self-care task.        Shaun Farrell, PTA

## 2024-03-15 NOTE — CARE COORDINATION
Updated Britany from [facility]. She is on call this weekend if patient is ready [phone]

## 2024-03-15 NOTE — PROGRESS NOTES
[facility]  Office: [phone]  Haris Bledsoe DO, Dudley Reece DO, Dennys Church DO, Jerad Felder DO, Ade Putnam MD, Thi Prather MD, Refugio Hendrickson MD, Perla Schaeffer MD,  Devan Sauer MD, Blanka Knox MD, Rosalinda Mitchell MD,  Rosalind Cabello DO, Lorene Barrett MD, Iain Garcia MD, Stalin Bledsoe DO, Darlene Woodall MD,  Tremayne Acevedo DO, Karen Vidal MD, Tina Vivar MD, Krystal Harding MD, Ileana Soriano MD,  Dereck Hernandez MD, Elvia Osullivan MD, Remigio Jaimes MD, Nathaniel Awan MD, Casey Lay MD, Omero Keys MD, Robin Landin DO, Real Alanis DO, Marianela Taylor MD,  Kane Oswald MD, Shirley Waterhouse, CNP,  Abby Meadows, CNP, Camden Rehman, CNP,  Christi Ellis, DNP, Christine Conn, CNP, Loraine Suarez, CNP, Demetria Pham CNP, Nicki Darby, CNP, Elizabeth Christopher, CNP, Trini Garcia, PA-C, Christin Brownlee, PA-C, Marli Guzman, CNP, Velvet Olivares, CNP, Ayse Layton, CNP, Tara He, CNS, Kori Claudio, CNP, Kimberly Hassan, CNP, Tracy Schwab, CNP         [facility]   IN-PATIENT SERVICE   [facility]    Progress Note    3/15/2024    3:27 PM    Name:   Michael Mcghee  MRN:     [id number]     Acct:      [id number]   Room:   2007/2007-01  IP Day:  7  Admit Date:  3/8/2024 11:05 AM    PCP:   Shaun Peralta DO  Code Status:  Full Code    Subjective:     C/C:   Chief Complaint   Patient presents with    Shortness of Breath     \"For months\"    Leg Swelling     bilateral     Interval History Status: not changed.     Patient seen and examined. Patient refused cardiac cath yesterday.  He is very agitated this morning.  He was initially sleeping but when woken up when asked questions he started screaming and told us to go out.  He was oriented to self, place.  My full discussion later in the day with the niece. Niece is requesting a male doctor to come speak with the patient.    Patient has significant sleep apnea, and does  desaturate when sleeping.  When he wakes up his saturations are above 100%.  Blood pressure stable.  Labs are stable.  Creatinine of 0.9 today.    Brief History:     88-year-old male with a medical history of coronary artery disease with CABG, hypertension, hyperlipidemia, knee replacement presents to the hospital with shortness of breath, worsening leg swelling.  Patient was admitted with concern of acute on chronic heart failure.  Patient is living in assisted living.  Patient had elevated blood pressure on arrival.  Troponins are elevated.  Chest x-ray showed concern for cardiomegaly with mild pulmonary edema.  Patient was started on IV Lasix.  Echo showed an fraction of 36%.    Medications:     Allergies:  No Known Allergies    Current Meds:   Scheduled Meds:    silver sulfADIAZINE   Topical BID    metoprolol succinate  50 mg Oral Daily    heparin (porcine)  5,000 Units SubCUTAneous 3 times per day    furosemide  20 mg Oral Daily    [Held by provider] losartan  25 mg Oral Daily    rosuvastatin  10 mg Oral Daily    guaiFENesin  600 mg Oral BID    aspirin  81 mg Oral Daily    tamsulosin  0.4 mg Oral Daily    sodium chloride flush  5-40 mL IntraVENous 2 times per day     Continuous Infusions:    sodium chloride       PRN Meds: hypromellose, sodium chloride flush, sodium chloride, ondansetron **OR** ondansetron, polyethylene glycol, acetaminophen **OR** acetaminophen, potassium chloride **OR** potassium alternative oral replacement **OR** potassium chloride, magnesium sulfate, albuterol    Data:     Past Medical History:   has a past medical history of Acute CHF (HCC), Arthropathy, unspecified, other specified sites, 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

## 2024-03-15 NOTE — PROGRESS NOTES
03/13/2024 10:47 AM    LEUKOCYTESUR NEGATIVE 03/13/2024 10:47 AM    UROBILINOGEN Normal 03/13/2024 10:47 AM    BILIRUBINUR NEGATIVE 03/13/2024 10:47 AM    GLUCOSEU NEGATIVE 03/13/2024 10:47 AM    KETUA NEGATIVE 03/13/2024 10:47 AM     RADIOLOGY    EXAMINATION:  ULTRASOUND OF THE KIDNEYS     3/12/2024 7:18 pm     COMPARISON:  CT abdomen 08/19/2023     HISTORY:  ORDERING SYSTEM PROVIDED HISTORY: NADIR.  TECHNOLOGIST PROVIDED HISTORY:  NADIR.     FINDINGS:  The right kidney measures 10.2 cm in length and the left kidney measures 10.1  cm in length.     Kidneys demonstrate normal cortical echogenicity.  No hydronephrosis or  intrarenal stones.  No focal lesions.  Incidental right renal cyst measures  8.5 mm.     IMPRESSION:  Unremarkable ultrasound of the kidneys.    Assessment:     Acute kidney injury due to prerenal azotemia: Resolved and now creatinine is 0.8 mg/dL shortness of breath with a drop in ejection fraction.  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  Patient refused cardiac catheterization again..  Coronary artery disease history of CABG in 1990.  Hypertension.  BPH.  Plan:   No further recommendation from nephrology  If patient is agreeable for heart catheter can be performed under the cover of IV fluids  Renal sign off at this point please call if you have any question  Nutrition       Thank you for the consultation. Please do not hesitate to contact us for any further questions/concerns.     Alvarez Griggs MD  [facility]     This note is created with the assistance of a speech-recognition program. While intending to generate a document that actually reflects the content of the visit, no guarantees can be provided that every mistake has been identified and corrected by editing.

## 2024-03-15 NOTE — PROGRESS NOTES
[facility]   Progress Note                   Date:   3/15/2024  Patient name: Michael Mcghee  Date of admission:  3/8/2024 11:05 AM  MRN:   [id number]  YOB: 1935  PCP: Shaun Peralta DO    Reason for Admission: SOB (shortness of breath) [R06.02]  Leg swelling [M79.89]  CHF with unknown LVEF (HCC) [I50.9]  Pneumonia of right lower lobe due to infectious organism [J18.9]  New onset of congestive heart failure (HCC) [I50.9]    Subjective:       Clinical Changes / Abnormalities:Patient seen and examined with Dr. Barrett in room.  Agitated with questions refusing cath tele/vitals/labs reviewed .       Medications:   Scheduled Meds:   silver sulfADIAZINE   Topical BID    metoprolol succinate  50 mg Oral Daily    heparin (porcine)  5,000 Units SubCUTAneous 3 times per day    [Held by provider] furosemide  20 mg Oral Daily    [Held by provider] losartan  25 mg Oral Daily    rosuvastatin  10 mg Oral Daily    guaiFENesin  600 mg Oral BID    aspirin  81 mg Oral Daily    tamsulosin  0.4 mg Oral Daily    sodium chloride flush  5-40 mL IntraVENous 2 times per day     Continuous Infusions:   sodium chloride       CBC:   Recent Labs     03/14/24  1058   WBC 6.6   HGB 13.2          BMP:    Recent Labs     03/13/24  0626 03/14/24  1058 03/15/24  0956    139 139   K 3.9 4.0 4.6   CL 99 101 103   CO2 27 27 26   BUN 44* 43* 29*   CREATININE 1.1 1.0 0.9   GLUCOSE 97 97 106*       Hepatic:   No results for input(s): \"AST\", \"ALT\", \"ALB\", \"BILITOT\", \"ALKPHOS\" in the last 72 hours.    Troponin:   No results for input(s): \"TROPHS\" in the last 72 hours.    BNP: No results for input(s): \"BNP\" in the last 72 hours.  Lipids:   No results for input(s): \"CHOL\", \"HDL\" in the last 72 hours.    Invalid input(s): \"LDLCALCU\"    INR: No results for input(s): \"INR\" in the last 72 hours.      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 Barrett on the phone to speak with the niece updates were given still asking for for cardiology male fellow  to talk to her when she is present in the room-cardiology fellow was notified.  , if niece try to convince him again after talking to cardiology follow will add him for cath otherwise medical treatment given though fact multiple attempts to convince patient for heart cath but adamantly refusing today Continue ASA, statin, BB  Volume management/diuresis   per nephrology     Electronically signed by KINDRA Reyes NP on 3/15/2024 at 10:49 AM  [facility] [employer]  [phone]

## 2024-03-15 NOTE — PROGRESS NOTES
Occupational Therapy  Facility/Department: [facility] CAR 2- STEPDOWN  Occupational Therapy Daily Treatment Note    Name: Michael Mcghee  : 1935  MRN: [id number]  Date of Service: 3/15/2024    Discharge Recommendations:  Patient would benefit from continued therapy after discharge          Patient Diagnosis(es): The primary encounter diagnosis was Leg swelling. Diagnoses of New onset of congestive heart failure (HCC), SOB (shortness of breath), Pneumonia of right lower lobe due to infectious organism, and Congestive heart failure (CHF) (HCC) were also pertinent to this visit.  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  Past Surgical History:  has a past surgical history that includes Ankle surgery (Bilateral); Cardiac surgery; Cardiac surgery; joint replacement (Right); Nerve Block (2014); and Cardiac catheterization (2024).    Treatment Diagnosis: CHF with unknown LVEF (HCC)      Assessment   Performance deficits / Impairments: Decreased functional mobility ;Decreased ADL status;Decreased strength;Decreased safe awareness;Decreased cognition;Decreased endurance;Decreased balance;Decreased high-level IADLs  Assessment: Pt completed bed mobility and STS w/ SS MAX A x2 from EOB>recliner. Pt unable to clear EOB on first attempt, successful
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
## 2024-03-16 VITALS
HEIGHT: 70 IN | TEMPERATURE: 98.8 F | RESPIRATION RATE: 28 BRPM | WEIGHT: 209.44 LBS | HEART RATE: 59 BPM | SYSTOLIC BLOOD PRESSURE: 101 MMHG | BODY MASS INDEX: 29.98 KG/M2 | OXYGEN SATURATION: 100 % | DIASTOLIC BLOOD PRESSURE: 63 MMHG

## 2024-03-16 LAB
ANION GAP SERPL CALCULATED.3IONS-SCNC: 9 MMOL/L (ref 9–16)
BASOPHILS # BLD: <0.03 K/UL (ref 0–0.2)
BASOPHILS NFR BLD: 0 % (ref 0–2)
BUN SERPL-MCNC: 29 MG/DL (ref 8–23)
CALCIUM SERPL-MCNC: 9.1 MG/DL (ref 8.6–10.4)
CHLORIDE SERPL-SCNC: 102 MMOL/L (ref 98–107)
CO2 SERPL-SCNC: 28 MMOL/L (ref 20–31)
CREAT SERPL-MCNC: 1 MG/DL (ref 0.7–1.2)
EOSINOPHIL # BLD: 0.1 K/UL (ref 0–0.44)
EOSINOPHILS RELATIVE PERCENT: 1 % (ref 1–4)
ERYTHROCYTE [DISTWIDTH] IN BLOOD BY AUTOMATED COUNT: 12.5 % (ref 11.8–14.4)
GFR SERPL CREATININE-BSD FRML MDRD: >60 ML/MIN/1.73M2
GLUCOSE BLD-MCNC: 100 MG/DL (ref 75–110)
GLUCOSE BLD-MCNC: 135 MG/DL (ref 75–110)
GLUCOSE BLD-MCNC: 97 MG/DL (ref 75–110)
GLUCOSE SERPL-MCNC: 97 MG/DL (ref 74–99)
HAV IGM SERPL QL IA: NONREACTIVE
HBV CORE IGM SERPL QL IA: NONREACTIVE
HBV SURFACE AG SERPL QL IA: NONREACTIVE
HCT VFR BLD AUTO: 42.8 % (ref 40.7–50.3)
HCV AB SERPL QL IA: NONREACTIVE
HGB BLD-MCNC: 13.7 G/DL (ref 13–17)
IMM GRANULOCYTES # BLD AUTO: 0.04 K/UL (ref 0–0.3)
IMM GRANULOCYTES NFR BLD: 0 %
LYMPHOCYTES NFR BLD: 1.77 K/UL (ref 1.1–3.7)
LYMPHOCYTES RELATIVE PERCENT: 19 % (ref 24–43)
MCH RBC QN AUTO: 30.4 PG (ref 25.2–33.5)
MCHC RBC AUTO-ENTMCNC: 32 G/DL (ref 28.4–34.8)
MCV RBC AUTO: 95.1 FL (ref 82.6–102.9)
MONOCYTES NFR BLD: 1.2 K/UL (ref 0.1–1.2)
MONOCYTES NFR BLD: 13 % (ref 3–12)
NEUTROPHILS NFR BLD: 67 % (ref 36–65)
NEUTS SEG NFR BLD: 6.08 K/UL (ref 1.5–8.1)
NRBC BLD-RTO: 0 PER 100 WBC
PLATELET # BLD AUTO: 191 K/UL (ref 138–453)
PMV BLD AUTO: 10.7 FL (ref 8.1–13.5)
POTASSIUM SERPL-SCNC: 4.3 MMOL/L (ref 3.7–5.3)
RBC # BLD AUTO: 4.5 M/UL (ref 4.21–5.77)
SODIUM SERPL-SCNC: 139 MMOL/L (ref 136–145)
WBC OTHER # BLD: 9.2 K/UL (ref 3.5–11.3)

## 2024-03-16 PROCEDURE — 80048 BASIC METABOLIC PNL TOTAL CA: CPT

## 2024-03-16 PROCEDURE — 6360000002 HC RX W HCPCS: Performed by: STUDENT IN AN ORGANIZED HEALTH CARE EDUCATION/TRAINING PROGRAM

## 2024-03-16 PROCEDURE — 6370000000 HC RX 637 (ALT 250 FOR IP): Performed by: STUDENT IN AN ORGANIZED HEALTH CARE EDUCATION/TRAINING PROGRAM

## 2024-03-16 PROCEDURE — 85025 COMPLETE CBC W/AUTO DIFF WBC: CPT

## 2024-03-16 PROCEDURE — 99232 SBSQ HOSP IP/OBS MODERATE 35: CPT | Performed by: STUDENT IN AN ORGANIZED HEALTH CARE EDUCATION/TRAINING PROGRAM

## 2024-03-16 PROCEDURE — 6370000000 HC RX 637 (ALT 250 FOR IP): Performed by: NURSE PRACTITIONER

## 2024-03-16 PROCEDURE — 99233 SBSQ HOSP IP/OBS HIGH 50: CPT | Performed by: SURGERY

## 2024-03-16 PROCEDURE — 6370000000 HC RX 637 (ALT 250 FOR IP)

## 2024-03-16 PROCEDURE — 97530 THERAPEUTIC ACTIVITIES: CPT

## 2024-03-16 PROCEDURE — 2700000000 HC OXYGEN THERAPY PER DAY

## 2024-03-16 PROCEDURE — 82947 ASSAY GLUCOSE BLOOD QUANT: CPT

## 2024-03-16 PROCEDURE — 36415 COLL VENOUS BLD VENIPUNCTURE: CPT

## 2024-03-16 PROCEDURE — 94761 N-INVAS EAR/PLS OXIMETRY MLT: CPT

## 2024-03-16 PROCEDURE — 2580000003 HC RX 258: Performed by: NURSE PRACTITIONER

## 2024-03-16 PROCEDURE — 97535 SELF CARE MNGMENT TRAINING: CPT

## 2024-03-16 RX ORDER — FUROSEMIDE 20 MG/1
20 TABLET ORAL DAILY
Qty: 60 TABLET | Refills: 3 | Status: SHIPPED | OUTPATIENT
Start: 2024-03-17

## 2024-03-16 RX ORDER — METOPROLOL SUCCINATE 50 MG/1
50 TABLET, EXTENDED RELEASE ORAL DAILY
Qty: 30 TABLET | Refills: 3 | Status: SHIPPED | OUTPATIENT
Start: 2024-03-17

## 2024-03-16 RX ADMIN — SODIUM CHLORIDE, PRESERVATIVE FREE 10 ML: 5 INJECTION INTRAVENOUS at 10:43

## 2024-03-16 RX ADMIN — GUAIFENESIN 600 MG: 600 TABLET, EXTENDED RELEASE ORAL at 10:36

## 2024-03-16 RX ADMIN — METOPROLOL SUCCINATE 50 MG: 25 TABLET, EXTENDED RELEASE ORAL at 10:36

## 2024-03-16 RX ADMIN — SILVER SULFADIAZINE: 10 CREAM TOPICAL at 10:42

## 2024-03-16 RX ADMIN — LOSARTAN POTASSIUM 25 MG: 50 TABLET, FILM COATED ORAL at 10:56

## 2024-03-16 RX ADMIN — ASPIRIN 81 MG: 81 TABLET, COATED ORAL at 10:36

## 2024-03-16 RX ADMIN — ROSUVASTATIN CALCIUM 10 MG: 10 TABLET, COATED ORAL at 10:36

## 2024-03-16 RX ADMIN — FUROSEMIDE 20 MG: 20 TABLET ORAL at 10:36

## 2024-03-16 RX ADMIN — HEPARIN SODIUM 5000 UNITS: 5000 INJECTION INTRAVENOUS; SUBCUTANEOUS at 10:56

## 2024-03-16 RX ADMIN — TAMSULOSIN HYDROCHLORIDE 0.4 MG: 0.4 CAPSULE ORAL at 10:36

## 2024-03-16 RX ADMIN — ACETAMINOPHEN 650 MG: 325 TABLET ORAL at 06:28

## 2024-03-16 ASSESSMENT — PAIN DESCRIPTION - LOCATION: LOCATION: HIP;LEG

## 2024-03-16 ASSESSMENT — PAIN - FUNCTIONAL ASSESSMENT: PAIN_FUNCTIONAL_ASSESSMENT: PREVENTS OR INTERFERES SOME ACTIVE ACTIVITIES AND ADLS

## 2024-03-16 ASSESSMENT — PAIN DESCRIPTION - DESCRIPTORS: DESCRIPTORS: ACHING;DISCOMFORT

## 2024-03-16 ASSESSMENT — PAIN DESCRIPTION - ORIENTATION: ORIENTATION: LEFT

## 2024-03-16 ASSESSMENT — PAIN SCALES - GENERAL: PAINLEVEL_OUTOF10: 6

## 2024-03-16 NOTE — PROGRESS NOTES
[facility]   Progress Note                   Date:   3/16/2024  Patient name: Michael Mcghee  Date of admission:  3/8/2024 11:05 AM  MRN:   [id number]  YOB: 1935  PCP: Shaun Peralta DO    Reason for Admission: SOB (shortness of breath) [R06.02]  Leg swelling [M79.89]  CHF with unknown LVEF (HCC) [I50.9]  Pneumonia of right lower lobe due to infectious organism [J18.9]  New onset of congestive heart failure (HCC) [I50.9]    Subjective:       Clinical Changes / Abnormalities:Patient seen and examined tele/vitals/labs reviewed .       Medications:   Scheduled Meds:   sodium chloride flush  5-40 mL IntraVENous 2 times per day    silver sulfADIAZINE   Topical BID    metoprolol succinate  50 mg Oral Daily    heparin (porcine)  5,000 Units SubCUTAneous 3 times per day    furosemide  20 mg Oral Daily    losartan  25 mg Oral Daily    rosuvastatin  10 mg Oral Daily    guaiFENesin  600 mg Oral BID    aspirin  81 mg Oral Daily    tamsulosin  0.4 mg Oral Daily    sodium chloride flush  5-40 mL IntraVENous 2 times per day     Continuous Infusions:   sodium chloride      sodium chloride       CBC:   Recent Labs     03/14/24  1058 03/16/24  0622   WBC 6.6 9.2   HGB 13.2 13.7    191       BMP:    Recent Labs     03/14/24  1058 03/15/24  0956 03/16/24  0622    139 139   K 4.0 4.6 4.3    103 102   CO2 27 26 28   BUN 43* 29* 29*   CREATININE 1.0 0.9 1.0   GLUCOSE 97 106* 97       Hepatic:   No results for input(s): \"AST\", \"ALT\", \"ALB\", \"BILITOT\", \"ALKPHOS\" in the last 72 hours.    Troponin:   No results for input(s): \"TROPHS\" in the last 72 hours.    BNP: No results for input(s): \"BNP\" in the last 72 hours.  Lipids:   No results for input(s): \"CHOL\", \"HDL\" in the last 72 hours.    Invalid input(s): \"LDLCALCU\"    INR: No results for input(s): \"INR\" in the last 72 hours.      DATA:    EKG:   Normal sinus rhythm, no acute ST-T wave changes      ECHO: 3/2024   Interpretation  auscultation  Heart: Regular rate and rhythm, s1/s2 auscultated, no murmurs  Abdomen: soft, non-tender, bowel sounds active  Extremities: no edema  Neurologic: not done        Assessment / Acute Cardiac Problems:   Acute new onset CHF, unspecified  Minimal elevated troponin with a flat trend likely due to CHF  CAD s/p CABG in 1990, records not available  Essential hypertension  Hyperlipidemia  Remote hx of subdural hematoma s/p evacuation      Patient Active Problem List:     Spinal stenosis, lumbar region, without neurogenic claudication     Arthropathy, unspecified, other specified sites     CAD (coronary artery disease)     Hypertension     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 (HCC)     Benign hypertensive heart disease without congestive heart failure     Chronic ischemic heart disease     Hyperlipidemia     Mitral valve disease     Obesity     Presence of aortocoronary bypass graft     Presence of coronary angioplasty implant and graft     Pure hypercholesterolemia     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)     SOB (shortness of breath)     Subdural hematoma (HCC)     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)     CHF with unknown LVEF (HCC)     Elevated troponin      Plan of Treatment:   New onset CHF on ECHO with ejection fraction 30 to 35% on echo in 40 to 45% on cath-recommend MUGA (can't  be done today as nuclear is busy stated )scan for discrepancy in LV gram and echo finding - can be done outpatient if pending discharge   underwent cardiac cath yesterday as above -Continue lasix , ASA, statin, BB And Cozaar  NADIR resolved - nephrology signed off  Follow up with cardiology in 2 weeks on discharge     Electronically signed by KINDRA Reyes NP on 3/16/2024 at 11:15 AM  [facility]  [phone]
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
## 2024-03-16 NOTE — PLAN OF CARE
Problem: Safety - Adult  Goal: Free from fall injury  Outcome: Completed     Problem: Discharge Planning  Goal: Discharge to home or other facility with appropriate resources  Outcome: Completed     Problem: Skin/Tissue Integrity  Goal: Absence of new skin breakdown  Description: 1.  Monitor for areas of redness and/or skin breakdown  2.  Assess vascular access sites hourly  3.  Every 4-6 hours minimum:  Change oxygen saturation probe site  4.  Every 4-6 hours:  If on nasal continuous positive airway pressure, respiratory therapy assess nares and determine need for appliance change or resting period.  Outcome: Completed     Problem: ABCDS Injury Assessment  Goal: Absence of physical injury  Outcome: Completed     Problem: Chronic Conditions and Co-morbidities  Goal: Patient's chronic conditions and co-morbidity symptoms are monitored and maintained or improved  Outcome: Completed     Problem: Pain  Goal: Verbalizes/displays adequate comfort level or baseline comfort level  Outcome: Completed     Problem: Respiratory - Adult  Goal: Achieves optimal ventilation and oxygenation  Outcome: Completed

## 2024-03-16 NOTE — PROGRESS NOTES
RT to pt room to assess for Bipap per order. Pt is awake and alert, pt refused bipap. Pt on 3L NC with Sp02 92% in no signs of distress.
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
## 2024-03-16 NOTE — PROGRESS NOTES
Occupational Therapy  Facility/Department: [facility] CAR 2- STEPDOWN  Occupational Therapy Daily Treatment Note    Name: Micheal Mcghee  : 1935  MRN: [id number]  Date of Service: 3/16/2024    Discharge Recommendations:  Patient would benefit from continued therapy after discharge  OT Equipment Recommendations  Other: Continue to assess as pt progressess    Patient Diagnosis(es): The primary encounter diagnosis was Leg swelling. Diagnoses of New onset of congestive heart failure (HCC), SOB (shortness of breath), Pneumonia of right lower lobe due to infectious organism, Congestive heart failure (CHF) (HCC), Coronary artery disease, and Ischemic cardiomyopathy were also pertinent to this visit.  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  Past Surgical History:  has a past surgical history that includes Ankle surgery (Bilateral); Cardiac surgery; Cardiac surgery; joint replacement (Right); Nerve Block (2014); and Cardiac catheterization (2024).    Treatment Diagnosis: CHF with unknown LVEF (HCC)    Assessment   Performance deficits / Impairments: Decreased functional mobility ;Decreased ADL status;Decreased strength;Decreased safe awareness;Decreased cognition;Decreased endurance;Decreased balance;Decreased high-level IADLs  Assessment: Pt  Left: Maximum assistance  Rolling to Right: Maximum assistance  Supine to Sit: Maximum assistance;2 Person assistance  Sit to Supine: Maximum assistance;2 Person assistance  Scooting: Moderate assistance  Bed Mobility Comments: Increased time/effort required, assist for trunk/BLE progression d/t decreased strength/balance. Required hand over hand assist for bed rail use during rolling L/R.    Cognition  Overall Cognitive Status: Exceptions  Arousal/Alertness: Appropriate responses to stimuli  Following Commands: Follows multistep commands with increased time;Follows multistep commands with repitition  Attention Span: Attends with cues to redirect  Memory: Appears intact  Safety Judgement: Decreased awareness of need for safety;Decreased awareness of need for assistance  Problem Solving: Assistance required to generate solutions;Assistance required to implement solutions;Assistance required to correct errors made  Insights: Decreased awareness of deficits  Initiation: Requires cues for some  Sequencing: Requires cues for some  Cognition Comment: Pt [place] and requires repetition of instructions at times. Pt verbalized self limiting speech such as, \"I can't do that.\" however is willing to attempt tasks with encouragement.  Orientation  Overall Orientation Status: Within Functional Limits  Orientation Level: Oriented X4    Education Given To: Patient  Education Provided: Role of Therapy;Transfer Training;Energy Conservation;Equipment  Education Provided Comments: Educated on role of OT, bed mobility techniques, importance of engagement in OOB activities, correction of R and posterior lean: good return  Education Method: Verbal  Barriers to Learning: Hearing;Cognition  Education Outcome: Verbalized understanding;Continued education needed    AM-PAC - ADL  AM-PAC Daily Activity - Inpatient   How much help is needed for putting on and taking off regular lower body clothing?: A Lot  How much help is needed for bathing (which

## 2024-03-16 NOTE — PLAN OF CARE
Problem: Safety - Adult  Goal: Free from fall injury  Outcome: Progressing     Problem: Discharge Planning  Goal: Discharge to home or other facility with appropriate resources  Outcome: Progressing     Problem: Chronic Conditions and Co-morbidities  Goal: Patient's chronic conditions and co-morbidity symptoms are monitored and maintained or improved  Outcome: Progressing     Problem: Pain  Goal: Verbalizes/displays adequate comfort level or baseline comfort level  Outcome: Progressing

## 2024-03-16 NOTE — CARE COORDINATION
Transitional Planning:   Pt has order to discharge. CM called Britany at [facility] and LVM on secure line, notifying pt ready for discharge and to call to set up time.   Transport [facility] faxed .   Called and spoke to Anthony patino and notified of discharge order and pt discharging to [facility]. States she will be up to notify pt d/t confusion   1645- Spoke with Ayse at [facility] who states they never rec'd fax. Re faxed to [facility], awaiting confirmation on time.   HENS and ADARSH completed and faxed to [phone] and copy in transport packet   Spoke with Ayse with [facility] and confirmed that pt will be p/u at 6pm . Called nathony patino and updated and she states she will be here in 5 min  1720-spoke with Britany at [facility] and notified of  time     Discharge Report    [facility]  Clinical Case Management Department  Written by: Alea Soliman    Patient Name: Michael Mcghee  Attending Provider: Lorene Barrett MD  Admit Date: 3/8/2024 11:05 AM  MRN: [id number]  Account: [id number]                     : 1935  Discharge Date:       Disposition: [facility]     Alea Soliman
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
## 2024-03-18 LAB
P E INTERPRETATION, U: NORMAL
PATHOLOGIST: NORMAL
SPECIMEN TYPE: NORMAL
SPECIMEN TYPE: NORMAL
SPECIMEN VOL UR: NORMAL ML
URINE IFX INTERP: NORMAL
URINE TOTAL PROTEIN: 53 MG/DL
URINE TOTAL PROTEIN: 53 MG/DL
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
## 2024-04-08 PROBLEM — R79.89 ELEVATED TROPONIN: Status: RESOLVED | Noted: 2024-03-09 | Resolved: 2024-04-08

## 2024-05-06 ENCOUNTER — OFFICE VISIT (OUTPATIENT)
Dept: PRIMARY CARE CLINIC | Age: 89
End: 2024-05-06
Payer: MEDICARE

## 2024-05-06 VITALS
DIASTOLIC BLOOD PRESSURE: 86 MMHG | HEART RATE: 57 BPM | SYSTOLIC BLOOD PRESSURE: 128 MMHG | OXYGEN SATURATION: 96 % | HEIGHT: 69 IN | BODY MASS INDEX: 28.05 KG/M2

## 2024-05-06 DIAGNOSIS — I10 PRIMARY HYPERTENSION: ICD-10-CM

## 2024-05-06 DIAGNOSIS — H61.23 BILATERAL IMPACTED CERUMEN: ICD-10-CM

## 2024-05-06 DIAGNOSIS — T24.012D: ICD-10-CM

## 2024-05-06 DIAGNOSIS — M48.061 SPINAL STENOSIS, LUMBAR REGION, WITHOUT NEUROGENIC CLAUDICATION: Primary | Chronic | ICD-10-CM

## 2024-05-06 PROBLEM — N40.0 BENIGN PROSTATIC HYPERPLASIA: Status: ACTIVE | Noted: 2024-05-06

## 2024-05-06 PROCEDURE — 1123F ACP DISCUSS/DSCN MKR DOCD: CPT | Performed by: NURSE PRACTITIONER

## 2024-05-06 PROCEDURE — 99204 OFFICE O/P NEW MOD 45 MIN: CPT | Performed by: NURSE PRACTITIONER

## 2024-05-06 PROCEDURE — G8427 DOCREV CUR MEDS BY ELIG CLIN: HCPCS | Performed by: NURSE PRACTITIONER

## 2024-05-06 PROCEDURE — 1036F TOBACCO NON-USER: CPT | Performed by: NURSE PRACTITIONER

## 2024-05-06 PROCEDURE — 69209 REMOVE IMPACTED EAR WAX UNI: CPT | Performed by: NURSE PRACTITIONER

## 2024-05-06 PROCEDURE — G8419 CALC BMI OUT NRM PARAM NOF/U: HCPCS | Performed by: NURSE PRACTITIONER

## 2024-05-06 RX ORDER — POLYETHYLENE GLYCOL 3350 17 G/17G
17 POWDER, FOR SOLUTION ORAL
COMMUNITY

## 2024-05-06 RX ORDER — AMINO ACIDS/PROTEIN HYDROLYS 15G-100/30
30 LIQUID (ML) ORAL 2 TIMES DAILY
COMMUNITY

## 2024-05-06 RX ORDER — ACETAMINOPHEN 500 MG
500 TABLET ORAL EVERY 6 HOURS PRN
Qty: 1 TABLET | Refills: 1
Start: 2024-05-06

## 2024-05-06 RX ORDER — AMINO ACIDS/PROTEIN HYDROLYS 15G-100/30
30 LIQUID (ML) ORAL 2 TIMES DAILY
Qty: 900 ML | Refills: 0
Start: 2024-05-06 | End: 2024-05-06 | Stop reason: SDUPTHER

## 2024-05-06 RX ORDER — NYSTATIN 100000 [USP'U]/G
POWDER TOPICAL 2 TIMES DAILY
COMMUNITY

## 2024-05-06 SDOH — ECONOMIC STABILITY: FOOD INSECURITY: WITHIN THE PAST 12 MONTHS, YOU WORRIED THAT YOUR FOOD WOULD RUN OUT BEFORE YOU GOT MONEY TO BUY MORE.: NEVER TRUE

## 2024-05-06 SDOH — ECONOMIC STABILITY: HOUSING INSECURITY
IN THE LAST 12 MONTHS, WAS THERE A TIME WHEN YOU DID NOT HAVE A STEADY PLACE TO SLEEP OR SLEPT IN A SHELTER (INCLUDING NOW)?: NO

## 2024-05-06 SDOH — ECONOMIC STABILITY: FOOD INSECURITY: WITHIN THE PAST 12 MONTHS, THE FOOD YOU BOUGHT JUST DIDN'T LAST AND YOU DIDN'T HAVE MONEY TO GET MORE.: NEVER TRUE

## 2024-05-06 SDOH — ECONOMIC STABILITY: INCOME INSECURITY: HOW HARD IS IT FOR YOU TO PAY FOR THE VERY BASICS LIKE FOOD, HOUSING, MEDICAL CARE, AND HEATING?: NOT VERY HARD

## 2024-05-06 ASSESSMENT — ENCOUNTER SYMPTOMS
DIARRHEA: 0
NAUSEA: 0
SINUS PRESSURE: 0
COUGH: 1
SHORTNESS OF BREATH: 1
CONSTIPATION: 0
SINUS PAIN: 0
BACK PAIN: 1

## 2024-05-06 ASSESSMENT — PATIENT HEALTH QUESTIONNAIRE - PHQ9
SUM OF ALL RESPONSES TO PHQ9 QUESTIONS 1 & 2: 0
2. FEELING DOWN, DEPRESSED OR HOPELESS: NOT AT ALL
SUM OF ALL RESPONSES TO PHQ QUESTIONS 1-9: 0
1. LITTLE INTEREST OR PLEASURE IN DOING THINGS: NOT AT ALL
SUM OF ALL RESPONSES TO PHQ QUESTIONS 1-9: 0

## 2024-05-06 NOTE — PROGRESS NOTES
[facility]  [facility] PRIMARY CARE  [address] [facility] [address]  Dept: [phone]    Michael Mcghee is a 88 y.o. male Established patient, who presents today for his medical conditions/complaints as noted below.      Chief Complaint   Patient presents with    New Patient       HPI:     HPI   We have seen patient in nursing and he has now moved to assisted living. He is adjusting well to AL.    He does not recall falling  but has  had falls in past.    He has pain in bad, shoulders, knees and feet most of the time.  He has wound on heel managed by nurses with silvadene.  HE does not remember well and has assistance.     He has a burn on left thigh and staff is applying Silver Sulfadiazine.    He uses wheelchair for mobility.  Nurses administer medication.    Reviewed prior notes: Cardiology   Reviewed previous:  Labs, Hospital Records, and point click care    No components found for: \"LDLCHOLESTEROL\", \"LDLCALC\"    (goal LDL is <100)   AST (U/L)   Date Value   03/08/2024 26     ALT (U/L)   Date Value   03/08/2024 11     BUN (mg/dL)   Date Value   03/16/2024 29 (H)     Hemoglobin A1C (%)   Date Value   11/01/2023 5.5     TSH (uIU/mL)   Date Value   03/09/2024 1.35     BP Readings from Last 3 Encounters:   05/06/24 128/86   04/11/24 126/82   03/16/24 101/63          (goal 120/80)    Past Medical History:   Diagnosis Date    Acute CHF ([facility]) 03/08/2024    Arthropathy, unspecified, other specified sites 02/20/2014    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

## 2024-06-18 RX ORDER — DAPAGLIFLOZIN 10 MG/1
10 TABLET, FILM COATED ORAL EVERY MORNING
Qty: 90 TABLET | Refills: 0 | Status: SHIPPED | OUTPATIENT
Start: 2024-06-18

## 2024-07-09 ENCOUNTER — OFFICE VISIT (OUTPATIENT)
Dept: PRIMARY CARE CLINIC | Age: 89
End: 2024-07-09
Payer: MEDICARE

## 2024-07-09 VITALS
WEIGHT: 221 LBS | DIASTOLIC BLOOD PRESSURE: 84 MMHG | HEIGHT: 69 IN | SYSTOLIC BLOOD PRESSURE: 124 MMHG | HEART RATE: 60 BPM | OXYGEN SATURATION: 98 % | BODY MASS INDEX: 32.73 KG/M2

## 2024-07-09 DIAGNOSIS — F41.9 ANXIETY AND DEPRESSION: ICD-10-CM

## 2024-07-09 DIAGNOSIS — Z79.899 MEDICATION MANAGEMENT: ICD-10-CM

## 2024-07-09 DIAGNOSIS — R56.1 SEIZURE AFTER HEAD INJURY (HCC): ICD-10-CM

## 2024-07-09 DIAGNOSIS — F32.A ANXIETY AND DEPRESSION: ICD-10-CM

## 2024-07-09 DIAGNOSIS — S06.5XAA SUBDURAL HEMATOMA (HCC): ICD-10-CM

## 2024-07-09 DIAGNOSIS — I25.719 ATHEROSCLEROSIS OF AUTOLOGOUS VEIN CORONARY ARTERY BYPASS GRAFT WITH ANGINA PECTORIS (HCC): ICD-10-CM

## 2024-07-09 DIAGNOSIS — R35.0 URINARY FREQUENCY: ICD-10-CM

## 2024-07-09 DIAGNOSIS — M79.672 LEFT FOOT PAIN: Primary | ICD-10-CM

## 2024-07-09 DIAGNOSIS — E78.5 HYPERLIPIDEMIA, UNSPECIFIED HYPERLIPIDEMIA TYPE: ICD-10-CM

## 2024-07-09 DIAGNOSIS — R07.9 CHEST PAIN, UNSPECIFIED TYPE: ICD-10-CM

## 2024-07-09 DIAGNOSIS — R06.2 WHEEZE: ICD-10-CM

## 2024-07-09 PROBLEM — E11.9 TYPE 2 DIABETES MELLITUS (HCC): Status: ACTIVE | Noted: 2024-07-09

## 2024-07-09 PROBLEM — E11.9 TYPE 2 DIABETES MELLITUS (HCC): Status: RESOLVED | Noted: 2024-07-09 | Resolved: 2024-07-09

## 2024-07-09 PROCEDURE — 1123F ACP DISCUSS/DSCN MKR DOCD: CPT | Performed by: NURSE PRACTITIONER

## 2024-07-09 PROCEDURE — 1036F TOBACCO NON-USER: CPT | Performed by: NURSE PRACTITIONER

## 2024-07-09 PROCEDURE — 99214 OFFICE O/P EST MOD 30 MIN: CPT | Performed by: NURSE PRACTITIONER

## 2024-07-09 PROCEDURE — G8427 DOCREV CUR MEDS BY ELIG CLIN: HCPCS | Performed by: NURSE PRACTITIONER

## 2024-07-09 PROCEDURE — G8417 CALC BMI ABV UP PARAM F/U: HCPCS | Performed by: NURSE PRACTITIONER

## 2024-07-09 RX ORDER — VENLAFAXINE 37.5 MG/1
37.5 TABLET ORAL 2 TIMES DAILY
Qty: 60 TABLET | Refills: 2 | Status: SHIPPED | OUTPATIENT
Start: 2024-07-09

## 2024-07-09 RX ORDER — ALBUTEROL SULFATE 2.5 MG/3ML
2.5 SOLUTION RESPIRATORY (INHALATION) 4 TIMES DAILY
Qty: 120 EACH | Refills: 1
Start: 2024-07-09 | End: 2024-07-12

## 2024-07-09 ASSESSMENT — ENCOUNTER SYMPTOMS
SINUS PAIN: 0
SHORTNESS OF BREATH: 1
SORE THROAT: 0
BACK PAIN: 1
COUGH: 1
CONSTIPATION: 0
WHEEZING: 1
DIARRHEA: 0

## 2024-07-09 NOTE — PROGRESS NOTES
Lipids      Return for keep September appointment.  Data Unavailable     Orders Placed This Encounter   Procedures    XR CHEST STANDARD (2 VW)     Standing Status:   Future     Standing Expiration Date:   7/9/2025     Order Specific Question:   Reason for exam:     Answer:   wheeze    XR FOOT LEFT (MIN 3 VIEWS)     Standing Status:   Future     Standing Expiration Date:   7/9/2025     Order Specific Question:   Reason for exam:     Answer:   pain    CBC with Auto Differential     Standing Status:   Future     Standing Expiration Date:   7/9/2025    Comprehensive Metabolic Panel, Fasting     Standing Status:   Future     Standing Expiration Date:   7/9/2025    Lipid, Fasting     Standing Status:   Future     Standing Expiration Date:   7/9/2025    Urinalysis with Reflex to Culture     Standing Status:   Future     Standing Expiration Date:   7/9/2025     Order Specific Question:   SPECIFY(EX-CATH,MIDSTREAM,CYSTO,ETC)?     Answer:   clean catch    EKG 12 lead     Standing Status:   Future     Standing Expiration Date:   9/7/2024     Order Specific Question:   Reason for Exam?     Answer:   Chest pain     Orders Placed This Encounter   Medications    albuterol (PROVENTIL) (2.5 MG/3ML) 0.083% nebulizer solution     Sig: Take 3 mLs by nebulization 4 times daily for 10 doses     Dispense:  120 each     Refill:  1    venlafaxine (EFFEXOR) 37.5 MG tablet     Sig: Take 1 tablet by mouth 2 times daily     Dispense:  60 tablet     Refill:  2       Patient given educational materials - see patient instructions.  Discussed use, benefit, and side effects of prescribed medications.  All patient questions answered. Pt voiced understanding. Reviewed health maintenance.  Instructed to continue current medications, diet and exercise.  Patient agreed with treatment plan. Follow up as directed.     Electronically signed by KINDRA Ross CNP on 7/9/2024 at 9:10 AM

## 2024-07-09 NOTE — PATIENT INSTRUCTIONS
Increase venlafaxine to 37.5 mg PO BID  EKG in office  UA with reflex to culture  Albuterol 0.083% nebulizer treatment 4x/day x 10 days  X-ray left foot  Chest x-ray  Labs: CBC with diff, CMP and Lipids

## 2024-07-30 ENCOUNTER — APPOINTMENT (OUTPATIENT)
Dept: CT IMAGING | Age: 89
DRG: 682 | End: 2024-07-30
Payer: MEDICARE

## 2024-07-30 ENCOUNTER — APPOINTMENT (OUTPATIENT)
Dept: GENERAL RADIOLOGY | Age: 89
DRG: 682 | End: 2024-07-30
Payer: MEDICARE

## 2024-07-30 ENCOUNTER — HOSPITAL ENCOUNTER (INPATIENT)
Age: 89
LOS: 6 days | Discharge: NURSING, REHAB OR LONG-TERM CARE | DRG: 682 | End: 2024-08-05
Attending: EMERGENCY MEDICINE | Admitting: INTERNAL MEDICINE
Payer: MEDICARE

## 2024-07-30 DIAGNOSIS — Z86.79 HISTORY OF SUBDURAL HEMATOMA: ICD-10-CM

## 2024-07-30 DIAGNOSIS — G93.41 METABOLIC ENCEPHALOPATHY: Primary | ICD-10-CM

## 2024-07-30 DIAGNOSIS — R41.0 CONFUSION: ICD-10-CM

## 2024-07-30 DIAGNOSIS — N17.9 AKI (ACUTE KIDNEY INJURY) (HCC): ICD-10-CM

## 2024-07-30 DIAGNOSIS — G40.009 LOCALIZATION-RELATED (FOCAL) (PARTIAL) IDIOPATHIC EPILEPSY AND EPILEPTIC SYNDROMES WITH SEIZURES OF LOCALIZED ONSET, NOT INTRACTABLE, WITHOUT STATUS EPILEPTICUS (HCC): ICD-10-CM

## 2024-07-30 LAB
ALBUMIN SERPL-MCNC: 3.1 G/DL (ref 3.5–5.2)
ALP SERPL-CCNC: 78 U/L (ref 40–129)
ALT SERPL-CCNC: 16 U/L (ref 5–41)
AMMONIA PLAS-SCNC: 22 UMOL/L (ref 16–60)
ANION GAP SERPL CALCULATED.3IONS-SCNC: 10 MMOL/L (ref 9–17)
AST SERPL-CCNC: 22 U/L
BACTERIA URNS QL MICRO: ABNORMAL
BASOPHILS # BLD: 0 K/UL (ref 0–0.2)
BASOPHILS NFR BLD: 0 % (ref 0–2)
BILIRUB SERPL-MCNC: 0.3 MG/DL (ref 0.3–1.2)
BILIRUB UR QL STRIP: NEGATIVE
BUN SERPL-MCNC: 39 MG/DL (ref 8–23)
CALCIUM SERPL-MCNC: 9.7 MG/DL (ref 8.6–10.4)
CASTS #/AREA URNS LPF: ABNORMAL /LPF
CHLORIDE SERPL-SCNC: 105 MMOL/L (ref 98–107)
CLARITY UR: CLEAR
CO2 SERPL-SCNC: 29 MMOL/L (ref 20–31)
COLOR UR: YELLOW
CREAT SERPL-MCNC: 1.7 MG/DL (ref 0.7–1.2)
EOSINOPHIL # BLD: 0.4 K/UL (ref 0–0.4)
EOSINOPHILS RELATIVE PERCENT: 7 % (ref 0–4)
EPI CELLS #/AREA URNS HPF: ABNORMAL /HPF
ERYTHROCYTE [DISTWIDTH] IN BLOOD BY AUTOMATED COUNT: 14.5 % (ref 11.5–14.9)
GFR, ESTIMATED: 38 ML/MIN/1.73M2
GLUCOSE SERPL-MCNC: 107 MG/DL (ref 70–99)
GLUCOSE UR STRIP-MCNC: ABNORMAL MG/DL
HCT VFR BLD AUTO: 38.5 % (ref 41–53)
HGB BLD-MCNC: 12.3 G/DL (ref 13.5–17.5)
HGB UR QL STRIP.AUTO: ABNORMAL
KETONES UR STRIP-MCNC: NEGATIVE MG/DL
LEUKOCYTE ESTERASE UR QL STRIP: NEGATIVE
LYMPHOCYTES NFR BLD: 1.5 K/UL (ref 1–4.8)
LYMPHOCYTES RELATIVE PERCENT: 24 % (ref 24–44)
MAGNESIUM SERPL-MCNC: 2.3 MG/DL (ref 1.6–2.6)
MCH RBC QN AUTO: 29.2 PG (ref 26–34)
MCHC RBC AUTO-ENTMCNC: 31.9 G/DL (ref 31–37)
MCV RBC AUTO: 91.8 FL (ref 80–100)
MONOCYTES NFR BLD: 0.5 K/UL (ref 0.1–1.3)
MONOCYTES NFR BLD: 8 % (ref 1–7)
NEUTROPHILS NFR BLD: 61 % (ref 36–66)
NEUTS SEG NFR BLD: 3.9 K/UL (ref 1.3–9.1)
NITRITE UR QL STRIP: NEGATIVE
PH UR STRIP: 6 [PH] (ref 5–8)
PLATELET # BLD AUTO: 176 K/UL (ref 150–450)
PMV BLD AUTO: 9.5 FL (ref 6–12)
POTASSIUM SERPL-SCNC: 3.7 MMOL/L (ref 3.7–5.3)
PROT SERPL-MCNC: 6.7 G/DL (ref 6.4–8.3)
PROT UR STRIP-MCNC: ABNORMAL MG/DL
RBC # BLD AUTO: 4.19 M/UL (ref 4.5–5.9)
RBC #/AREA URNS HPF: ABNORMAL /HPF
SODIUM SERPL-SCNC: 144 MMOL/L (ref 135–144)
SP GR UR STRIP: 1.01 (ref 1–1.03)
TROPONIN I SERPL HS-MCNC: 65 NG/L (ref 0–22)
TROPONIN I SERPL HS-MCNC: 72 NG/L (ref 0–22)
TROPONIN I SERPL HS-MCNC: 73 NG/L (ref 0–22)
UROBILINOGEN UR STRIP-ACNC: NORMAL EU/DL (ref 0–1)
WBC #/AREA URNS HPF: ABNORMAL /HPF
WBC OTHER # BLD: 6.4 K/UL (ref 3.5–11)

## 2024-07-30 PROCEDURE — 84484 ASSAY OF TROPONIN QUANT: CPT

## 2024-07-30 PROCEDURE — 6370000000 HC RX 637 (ALT 250 FOR IP): Performed by: EMERGENCY MEDICINE

## 2024-07-30 PROCEDURE — 96360 HYDRATION IV INFUSION INIT: CPT

## 2024-07-30 PROCEDURE — 82140 ASSAY OF AMMONIA: CPT

## 2024-07-30 PROCEDURE — 93005 ELECTROCARDIOGRAM TRACING: CPT | Performed by: EMERGENCY MEDICINE

## 2024-07-30 PROCEDURE — 6360000002 HC RX W HCPCS: Performed by: INTERNAL MEDICINE

## 2024-07-30 PROCEDURE — 6370000000 HC RX 637 (ALT 250 FOR IP)

## 2024-07-30 PROCEDURE — 70450 CT HEAD/BRAIN W/O DYE: CPT

## 2024-07-30 PROCEDURE — 81001 URINALYSIS AUTO W/SCOPE: CPT

## 2024-07-30 PROCEDURE — 85025 COMPLETE CBC W/AUTO DIFF WBC: CPT

## 2024-07-30 PROCEDURE — 83735 ASSAY OF MAGNESIUM: CPT

## 2024-07-30 PROCEDURE — 71045 X-RAY EXAM CHEST 1 VIEW: CPT

## 2024-07-30 PROCEDURE — 99285 EMERGENCY DEPT VISIT HI MDM: CPT

## 2024-07-30 PROCEDURE — 36415 COLL VENOUS BLD VENIPUNCTURE: CPT

## 2024-07-30 PROCEDURE — 2580000003 HC RX 258: Performed by: EMERGENCY MEDICINE

## 2024-07-30 PROCEDURE — 80053 COMPREHEN METABOLIC PANEL: CPT

## 2024-07-30 PROCEDURE — 2060000000 HC ICU INTERMEDIATE R&B

## 2024-07-30 PROCEDURE — 71250 CT THORAX DX C-: CPT

## 2024-07-30 RX ORDER — CALCIUM CARBONATE/VITAMIN D3 600 MG-10
1 TABLET ORAL NIGHTLY
Status: DISCONTINUED | OUTPATIENT
Start: 2024-07-30 | End: 2024-08-05 | Stop reason: HOSPADM

## 2024-07-30 RX ORDER — ONDANSETRON 4 MG/1
4 TABLET, ORALLY DISINTEGRATING ORAL EVERY 6 HOURS PRN
Status: DISCONTINUED | OUTPATIENT
Start: 2024-07-30 | End: 2024-08-05 | Stop reason: HOSPADM

## 2024-07-30 RX ORDER — NITROGLYCERIN 0.4 MG/1
0.4 TABLET SUBLINGUAL EVERY 5 MIN PRN
Status: DISCONTINUED | OUTPATIENT
Start: 2024-07-30 | End: 2024-08-05 | Stop reason: HOSPADM

## 2024-07-30 RX ORDER — BISACODYL 10 MG
10 SUPPOSITORY, RECTAL RECTAL DAILY PRN
Status: DISCONTINUED | OUTPATIENT
Start: 2024-07-30 | End: 2024-08-05 | Stop reason: HOSPADM

## 2024-07-30 RX ORDER — SODIUM CHLORIDE 9 MG/ML
INJECTION, SOLUTION INTRAVENOUS CONTINUOUS
Status: DISCONTINUED | OUTPATIENT
Start: 2024-07-30 | End: 2024-08-02

## 2024-07-30 RX ORDER — SPIRONOLACTONE 25 MG/1
12.5 TABLET ORAL DAILY
Status: DISCONTINUED | OUTPATIENT
Start: 2024-07-31 | End: 2024-08-05 | Stop reason: HOSPADM

## 2024-07-30 RX ORDER — FUROSEMIDE 20 MG/1
20 TABLET ORAL DAILY
Status: DISCONTINUED | OUTPATIENT
Start: 2024-07-31 | End: 2024-08-05 | Stop reason: HOSPADM

## 2024-07-30 RX ORDER — M-VIT,TX,IRON,MINS/CALC/FOLIC 27MG-0.4MG
1 TABLET ORAL DAILY
Status: DISCONTINUED | OUTPATIENT
Start: 2024-07-31 | End: 2024-08-05 | Stop reason: HOSPADM

## 2024-07-30 RX ORDER — LEVETIRACETAM 250 MG/1
250 TABLET ORAL 2 TIMES DAILY
Status: DISCONTINUED | OUTPATIENT
Start: 2024-07-30 | End: 2024-08-01

## 2024-07-30 RX ORDER — ACETAMINOPHEN 650 MG/1
650 SUPPOSITORY RECTAL EVERY 6 HOURS PRN
Status: DISCONTINUED | OUTPATIENT
Start: 2024-07-30 | End: 2024-08-05 | Stop reason: HOSPADM

## 2024-07-30 RX ORDER — ONDANSETRON 2 MG/ML
4 INJECTION INTRAMUSCULAR; INTRAVENOUS EVERY 6 HOURS PRN
Status: DISCONTINUED | OUTPATIENT
Start: 2024-07-30 | End: 2024-08-05 | Stop reason: HOSPADM

## 2024-07-30 RX ORDER — ATORVASTATIN CALCIUM 20 MG/1
20 TABLET, FILM COATED ORAL NIGHTLY
Status: DISCONTINUED | OUTPATIENT
Start: 2024-07-30 | End: 2024-08-05 | Stop reason: HOSPADM

## 2024-07-30 RX ORDER — POLYETHYLENE GLYCOL 3350 17 G/17G
17 POWDER, FOR SOLUTION ORAL DAILY PRN
Status: DISCONTINUED | OUTPATIENT
Start: 2024-07-30 | End: 2024-08-05 | Stop reason: HOSPADM

## 2024-07-30 RX ORDER — 0.9 % SODIUM CHLORIDE 0.9 %
500 INTRAVENOUS SOLUTION INTRAVENOUS ONCE
Status: COMPLETED | OUTPATIENT
Start: 2024-07-30 | End: 2024-07-30

## 2024-07-30 RX ORDER — LEVETIRACETAM 250 MG/1
250 TABLET ORAL 2 TIMES DAILY
Status: ON HOLD | COMMUNITY
End: 2024-08-01 | Stop reason: HOSPADM

## 2024-07-30 RX ORDER — 0.9 % SODIUM CHLORIDE 0.9 %
100 INTRAVENOUS SOLUTION INTRAVENOUS ONCE
Status: DISCONTINUED | OUTPATIENT
Start: 2024-07-30 | End: 2024-08-02

## 2024-07-30 RX ORDER — SODIUM CHLORIDE 0.9 % (FLUSH) 0.9 %
10 SYRINGE (ML) INJECTION PRN
Status: DISCONTINUED | OUTPATIENT
Start: 2024-07-30 | End: 2024-08-05 | Stop reason: HOSPADM

## 2024-07-30 RX ORDER — MAGNESIUM SULFATE 1 G/100ML
1000 INJECTION INTRAVENOUS PRN
Status: DISCONTINUED | OUTPATIENT
Start: 2024-07-30 | End: 2024-08-05 | Stop reason: HOSPADM

## 2024-07-30 RX ORDER — SODIUM CHLORIDE 0.9 % (FLUSH) 0.9 %
5-40 SYRINGE (ML) INJECTION EVERY 12 HOURS SCHEDULED
Status: DISCONTINUED | OUTPATIENT
Start: 2024-07-30 | End: 2024-08-05 | Stop reason: HOSPADM

## 2024-07-30 RX ORDER — ACETAMINOPHEN 500 MG
500 TABLET ORAL EVERY 6 HOURS PRN
Status: DISCONTINUED | OUTPATIENT
Start: 2024-07-30 | End: 2024-08-01

## 2024-07-30 RX ORDER — LIDOCAINE HYDROCHLORIDE 20 MG/ML
JELLY TOPICAL ONCE
Status: COMPLETED | OUTPATIENT
Start: 2024-07-30 | End: 2024-07-30

## 2024-07-30 RX ORDER — OXYBUTYNIN CHLORIDE 5 MG/1
5 TABLET, EXTENDED RELEASE ORAL 2 TIMES DAILY
Status: DISCONTINUED | OUTPATIENT
Start: 2024-07-30 | End: 2024-08-05 | Stop reason: HOSPADM

## 2024-07-30 RX ORDER — SODIUM CHLORIDE 9 MG/ML
INJECTION, SOLUTION INTRAVENOUS PRN
Status: DISCONTINUED | OUTPATIENT
Start: 2024-07-30 | End: 2024-08-05 | Stop reason: HOSPADM

## 2024-07-30 RX ORDER — METOPROLOL SUCCINATE 50 MG/1
50 TABLET, EXTENDED RELEASE ORAL DAILY
Status: DISCONTINUED | OUTPATIENT
Start: 2024-07-31 | End: 2024-08-05 | Stop reason: HOSPADM

## 2024-07-30 RX ORDER — ASPIRIN 81 MG/1
81 TABLET ORAL DAILY
Status: DISCONTINUED | OUTPATIENT
Start: 2024-07-31 | End: 2024-08-05 | Stop reason: HOSPADM

## 2024-07-30 RX ORDER — MIRTAZAPINE 15 MG/1
7.5 TABLET, FILM COATED ORAL NIGHTLY
Status: DISCONTINUED | OUTPATIENT
Start: 2024-07-30 | End: 2024-08-05 | Stop reason: HOSPADM

## 2024-07-30 RX ORDER — POTASSIUM CHLORIDE 7.45 MG/ML
10 INJECTION INTRAVENOUS PRN
Status: DISCONTINUED | OUTPATIENT
Start: 2024-07-30 | End: 2024-08-05 | Stop reason: HOSPADM

## 2024-07-30 RX ORDER — POLYETHYLENE GLYCOL 3350 17 G/17G
17 POWDER, FOR SOLUTION ORAL NIGHTLY
Status: DISCONTINUED | OUTPATIENT
Start: 2024-07-30 | End: 2024-08-05 | Stop reason: HOSPADM

## 2024-07-30 RX ORDER — ACETAMINOPHEN 325 MG/1
650 TABLET ORAL EVERY 6 HOURS PRN
Status: DISCONTINUED | OUTPATIENT
Start: 2024-07-30 | End: 2024-08-05 | Stop reason: HOSPADM

## 2024-07-30 RX ORDER — POTASSIUM CHLORIDE 20 MEQ/1
40 TABLET, EXTENDED RELEASE ORAL PRN
Status: DISCONTINUED | OUTPATIENT
Start: 2024-07-30 | End: 2024-08-05 | Stop reason: HOSPADM

## 2024-07-30 RX ORDER — ENOXAPARIN SODIUM 100 MG/ML
40 INJECTION SUBCUTANEOUS DAILY
Status: DISCONTINUED | OUTPATIENT
Start: 2024-07-30 | End: 2024-08-01

## 2024-07-30 RX ORDER — VENLAFAXINE 37.5 MG/1
37.5 TABLET ORAL 2 TIMES DAILY
Status: DISCONTINUED | OUTPATIENT
Start: 2024-07-30 | End: 2024-08-05 | Stop reason: HOSPADM

## 2024-07-30 RX ORDER — AMMONIUM LACTATE 12 G/100G
LOTION TOPICAL 2 TIMES DAILY
Status: DISCONTINUED | OUTPATIENT
Start: 2024-07-30 | End: 2024-08-05 | Stop reason: HOSPADM

## 2024-07-30 RX ORDER — CALCIUM CARBONATE 500(1250)
500 TABLET ORAL NIGHTLY
COMMUNITY

## 2024-07-30 RX ORDER — TAMSULOSIN HYDROCHLORIDE 0.4 MG/1
0.4 CAPSULE ORAL NIGHTLY
Status: DISCONTINUED | OUTPATIENT
Start: 2024-07-30 | End: 2024-08-05 | Stop reason: HOSPADM

## 2024-07-30 RX ORDER — ONDANSETRON 4 MG/1
4 TABLET, ORALLY DISINTEGRATING ORAL EVERY 8 HOURS PRN
Status: DISCONTINUED | OUTPATIENT
Start: 2024-07-30 | End: 2024-08-02

## 2024-07-30 RX ADMIN — ATORVASTATIN CALCIUM 20 MG: 20 TABLET, FILM COATED ORAL at 22:35

## 2024-07-30 RX ADMIN — MIRTAZAPINE 7.5 MG: 15 TABLET, FILM COATED ORAL at 21:53

## 2024-07-30 RX ADMIN — ENOXAPARIN SODIUM 40 MG: 100 INJECTION SUBCUTANEOUS at 21:53

## 2024-07-30 RX ADMIN — LEVETIRACETAM 250 MG: 250 TABLET, FILM COATED ORAL at 21:53

## 2024-07-30 RX ADMIN — VENLAFAXINE 37.5 MG: 37.5 TABLET ORAL at 22:35

## 2024-07-30 RX ADMIN — SODIUM CHLORIDE: 9 INJECTION, SOLUTION INTRAVENOUS at 13:54

## 2024-07-30 RX ADMIN — POLYETHYLENE GLYCOL 3350 17 G: 17 POWDER, FOR SOLUTION ORAL at 22:35

## 2024-07-30 RX ADMIN — TAMSULOSIN HYDROCHLORIDE 0.4 MG: 0.4 CAPSULE ORAL at 21:53

## 2024-07-30 RX ADMIN — CALCIUM CARBONATE 600 MG (1,500 MG)-VITAMIN D3 400 UNIT TABLET 1 TABLET: at 21:53

## 2024-07-30 RX ADMIN — OXYBUTYNIN CHLORIDE 5 MG: 5 TABLET, EXTENDED RELEASE ORAL at 22:35

## 2024-07-30 RX ADMIN — SODIUM CHLORIDE 500 ML: 9 INJECTION, SOLUTION INTRAVENOUS at 13:00

## 2024-07-30 RX ADMIN — SACUBITRIL AND VALSARTAN 1 TABLET: 24; 26 TABLET, FILM COATED ORAL at 21:53

## 2024-07-30 RX ADMIN — LIDOCAINE HYDROCHLORIDE: 20 JELLY TOPICAL at 15:19

## 2024-07-30 RX ADMIN — Medication: at 22:58

## 2024-07-30 ASSESSMENT — LIFESTYLE VARIABLES
HOW MANY STANDARD DRINKS CONTAINING ALCOHOL DO YOU HAVE ON A TYPICAL DAY: PATIENT DOES NOT DRINK
HOW OFTEN DO YOU HAVE A DRINK CONTAINING ALCOHOL: NEVER

## 2024-07-30 NOTE — PROGRESS NOTES
Patient transferred into bed with ED staff. Patient placed on monitor and 2L NC. Call light given to patient, bed in low and locked position, bed alarm on.

## 2024-07-30 NOTE — ED NOTES
Report given to EDWARD Mullins from U.   Report method by phone   The following was reviewed with receiving RN:   Current vital signs:  /60   Pulse 69   Temp 97.6 °F (36.4 °C) (Oral)   Resp 24   Ht 1.778 m (5' 10\")   Wt 90.7 kg (199 lb 15.3 oz)   SpO2 99%   BMI 28.69 kg/m²                MEWS Score: 1     Any medication or safety alerts were reviewed. Any pending diagnostics and notifications were also reviewed, as well as any safety concerns or issues, abnormal labs, abnormal imaging, and abnormal assessment findings. Questions were answered.

## 2024-07-30 NOTE — ED PROVIDER NOTES
EMERGENCY DEPARTMENT ENCOUNTER    Pt Name: Michael Mcghee  MRN: [id number]  Birthdate 1935  Date of evaluation: 7/30/24  CHIEF COMPLAINT       Chief Complaint   Patient presents with    Altered Mental Status     HISTORY OF PRESENT ILLNESS   88-year-old male presenting from [facility] for altered mental status.  Staff said that he was not acting appropriate.  Patient is alert to person and place and time.  He appears confused.  He had a recent hospitalization for sepsis and was on IV antibiotics.  He has no complaints of pain.  He is able to move all extremities.              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 ([facility]) 12/23/2008    Benign hypertensive heart disease without congestive heart failure 03/19/2007    CAD (coronary artery disease)     Chronic ischemic heart disease 11/27/2007    Hyperlipidemia 07/27/2020    Hypertension     Infection of prosthetic left knee joint ([facility]) 07/27/2020    Mitral valve disease 07/27/2020    Obesity 07/27/2020    Presence of aortocoronary bypass graft 03/19/2007    Presence of coronary angioplasty implant and graft 07/10/2006    Pure hypercholesterolemia 03/19/2007    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 ([facility]) 02/15/2019    SOB (shortness of breath) 07/27/2020    Spinal stenosis, lumbar region, without neurogenic claudication 12/12/2013    Subdural hematoma ([facility]) 02/09/2019    Type 2 diabetes mellitus 07/09/2024     Past Problem List  Patient Active Problem List   Diagnosis Code    Spinal stenosis, lumbar region, without neurogenic claudication M48.061    Arthropathy, unspecified, other specified sites M12.9    CAD (coronary artery disease) I25.10    Primary hypertension I10           PROCEDURES:    Procedures      DATA FOR LAB AND RADIOLOGY TESTS ORDERED BELOW ARE REVIEWED BY THE ED CLINICIAN:    RADIOLOGY: All x-rays, CT, MRI, and formal ultrasound images (except ED bedside ultrasound) are read by the radiologist, see reports below, unless otherwise noted in MDM or here.  Reports below are reviewed by myself.  CT CHEST ABDOMEN PELVIS WO CONTRAST Additional Contrast? None   Final Result   Small left-sided pleural effusion with associated atelectasis or scarring in   the left lower lobe.      Nodularity in the superior aspect of the right upper lobe which measures 3.6   mm in transverse.      Moderate stool in the rectum with associated mural thickening of the rectum   and adjacent inflammatory change related to stercoral colitis.  There are   findings related to constipation.      Diverticulosis of the colon.      Vascular disease.  There is dilatation of the ascending thoracic aorta which   measures a maximum 4.1 cm.      Additional findings noted above.      RECOMMENDATIONS:   Right solid pulmonary nodule within the upper lobe measuring 4 mm. Per   Fleischner Society Guidelines, if patient is low risk for malignancy, no   routine follow-up imaging is recommended. If patient is high risk for   malignancy, a non-contrast Chest CT at 12 months is optional. If performed   and the nodule is stable at 12 months, no further follow-up is recommended.      These guidelines do not apply to immunocompromised patients and patients with   cancer. Follow up in patients with significant comorbidities as clinically   warranted. For lung cancer screening, adhere to Lung-RADS guidelines.   Reference: Radiology. 2017; 284(1):228-43.            XR CHEST PORTABLE   Final Result   Left basilar opacification, pneumonia cannot be excluded. Follow-up chest   x-ray is recommended.         CT HEAD WO CONTRAST   Final Result   No acute intracranial abnormality.      Age related findings in the brain and findings

## 2024-07-30 NOTE — ED TRIAGE NOTES
Mode of arrival (squad #, walk in, police, etc) : EMS        Chief complaint(s): Altered mental status        Arrival Note (brief scenario, treatment PTA, etc).: Pt is coming from [facility], EMS states they reported altered mental status and not acting like himself. Was recently being treated for sepsis, receiving IV ATB.         C= \"Have you ever felt that you should Cut down on your drinking?\"  No  A= \"Have people Annoyed you by criticizing your drinking?\"  No  G= \"Have you ever felt bad or Guilty about your drinking?\"  No  E= \"Have you ever had a drink as an Eye-opener first thing in the morning to steady your nerves or to help a hangover?\"  No      Deferred []      Reason for deferring: N/A    *If yes to two or more: probable alcohol abuse.*

## 2024-07-30 NOTE — PROGRESS NOTES
Pharmacy Medication History Note      List of current medications patient is taking is complete.    Source of information: [facility] order summary    Changes made to medication list:  Medications removed (include reason, ex. therapy complete or physician discontinued, noncompliance):  None     Medications flagged for provider review:  Albuterol nebulizer solution - not on facility list   Magnesium hydroxide suspension - not on facility list   Nystatin powder - not on facility list  Senna - not on facility list     Medications added/doses adjusted:  Calcium carbonate 500 mg nightly   Levetiracetam 250 mg twice daily   Oxybutynin ER 5 mg increased to 5 mg twice daily   Polyethylene glycol changed to 17 g nightly     Other notes (ex. Recent course of antibiotics, Coumadin dosing):  N/A       Current Home Medication List at Time of Admission:  Prior to Admission medications    Medication Sig   levETIRAcetam (KEPPRA) 250 MG tablet Take 1 tablet by mouth 2 times daily   calcium carbonate (OSCAL) 500 MG TABS tablet Take 1 tablet by mouth at bedtime   sacubitril-valsartan (ENTRESTO) 24-26 MG per tablet Take 1 tablet by mouth 2 times daily   spironolactone (ALDACTONE) 25 MG tablet Take 0.5 tablets by mouth daily   nitroGLYCERIN (NITROSTAT) 0.4 MG SL tablet Place 1 tablet under the tongue every 5 minutes as needed for Chest pain up to max of 3 total doses. If no relief after 1 dose, call 911.   albuterol (PROVENTIL) (2.5 MG/3ML) 0.083% nebulizer solution Take 3 mLs by nebulization 4 times daily for 10 doses  Patient not taking: Reported on 7/10/2024   venlafaxine (EFFEXOR) 37.5 MG tablet Take 1 tablet by mouth 2 times daily   dapagliflozin (FARXIGA) 10 MG tablet Take 1 tablet by mouth every morning   polyethylene glycol (GLYCOLAX) 17 g packet Take 1 packet by mouth at bedtime   nystatin (MYCOSTATIN) 401704 UNIT/GM powder Apply topically 2 times daily Apply topically 2 times daily.  Patient not taking: Reported

## 2024-07-31 ENCOUNTER — APPOINTMENT (OUTPATIENT)
Dept: GENERAL RADIOLOGY | Age: 89
DRG: 682 | End: 2024-07-31
Payer: MEDICARE

## 2024-07-31 LAB
AMMONIA PLAS-SCNC: 25 UMOL/L (ref 16–60)
ANION GAP SERPL CALCULATED.3IONS-SCNC: 9 MMOL/L (ref 9–17)
ARTERIAL PATENCY WRIST A: ABNORMAL
BASOPHILS # BLD: 0.1 K/UL (ref 0–0.2)
BASOPHILS NFR BLD: 1 % (ref 0–2)
BDY SITE: ABNORMAL
BODY TEMPERATURE: 37
BUN SERPL-MCNC: 36 MG/DL (ref 8–23)
CALCIUM SERPL-MCNC: 9.3 MG/DL (ref 8.6–10.4)
CHLORIDE SERPL-SCNC: 105 MMOL/L (ref 98–107)
CO2 SERPL-SCNC: 30 MMOL/L (ref 20–31)
COHGB MFR BLD: 1 % (ref 0–5)
CREAT SERPL-MCNC: 1.7 MG/DL (ref 0.7–1.2)
EKG ATRIAL RATE: 68 BPM
EKG P AXIS: 17 DEGREES
EKG P-R INTERVAL: 188 MS
EKG Q-T INTERVAL: 420 MS
EKG QRS DURATION: 100 MS
EKG QTC CALCULATION (BAZETT): 446 MS
EKG R AXIS: -13 DEGREES
EKG T AXIS: 56 DEGREES
EKG VENTRICULAR RATE: 68 BPM
EOSINOPHIL # BLD: 0.3 K/UL (ref 0–0.4)
EOSINOPHILS RELATIVE PERCENT: 7 % (ref 0–4)
ERYTHROCYTE [DISTWIDTH] IN BLOOD BY AUTOMATED COUNT: 14.3 % (ref 11.5–14.9)
GAS FLOW.O2 O2 DELIVERY SYS: ABNORMAL L/MIN
GFR, ESTIMATED: 38 ML/MIN/1.73M2
GLUCOSE BLD-MCNC: 73 MG/DL (ref 75–110)
GLUCOSE BLD-MCNC: 95 MG/DL (ref 75–110)
GLUCOSE SERPL-MCNC: 88 MG/DL (ref 70–99)
HCO3 ARTERIAL: 31.7 MMOL/L (ref 22–26)
HCT VFR BLD AUTO: 37.4 % (ref 41–53)
HGB BLD-MCNC: 12.1 G/DL (ref 13.5–17.5)
INR PPP: 1.1
LEVETIRACETAM SERPL-MCNC: 9 UG/ML
LYMPHOCYTES NFR BLD: 1.6 K/UL (ref 1–4.8)
LYMPHOCYTES RELATIVE PERCENT: 30 % (ref 24–44)
MCH RBC QN AUTO: 30.1 PG (ref 26–34)
MCHC RBC AUTO-ENTMCNC: 32.4 G/DL (ref 31–37)
MCV RBC AUTO: 93 FL (ref 80–100)
METHEMOGLOBIN: 1.1 % (ref 0–1.9)
MONOCYTES NFR BLD: 0.5 K/UL (ref 0.1–1.3)
MONOCYTES NFR BLD: 9 % (ref 1–7)
NEUTROPHILS NFR BLD: 53 % (ref 36–66)
NEUTS SEG NFR BLD: 2.8 K/UL (ref 1.3–9.1)
O2 SAT, ARTERIAL: 94.7 % (ref 95–98)
PCO2 ARTERIAL: 63 MMHG (ref 35–45)
PH ARTERIAL: 7.31 (ref 7.35–7.45)
PLATELET # BLD AUTO: 159 K/UL (ref 150–450)
PMV BLD AUTO: 9.3 FL (ref 6–12)
PO2 ARTERIAL: 85.7 MMHG (ref 80–100)
POSITIVE BASE EXCESS, ART: 5.4 MMOL/L (ref 0–2)
POTASSIUM SERPL-SCNC: 3.8 MMOL/L (ref 3.7–5.3)
PROCALCITONIN SERPL-MCNC: 0.15 NG/ML
PROTHROMBIN TIME: 14.8 SEC (ref 11.8–14.6)
PT. POSITION: ABNORMAL
RBC # BLD AUTO: 4.02 M/UL (ref 4.5–5.9)
SODIUM SERPL-SCNC: 144 MMOL/L (ref 135–144)
WBC OTHER # BLD: 5.3 K/UL (ref 3.5–11)

## 2024-07-31 PROCEDURE — 99223 1ST HOSP IP/OBS HIGH 75: CPT | Performed by: INTERNAL MEDICINE

## 2024-07-31 PROCEDURE — 82947 ASSAY GLUCOSE BLOOD QUANT: CPT

## 2024-07-31 PROCEDURE — 80048 BASIC METABOLIC PNL TOTAL CA: CPT

## 2024-07-31 PROCEDURE — 94761 N-INVAS EAR/PLS OXIMETRY MLT: CPT

## 2024-07-31 PROCEDURE — 87040 BLOOD CULTURE FOR BACTERIA: CPT

## 2024-07-31 PROCEDURE — 85610 PROTHROMBIN TIME: CPT

## 2024-07-31 PROCEDURE — 85025 COMPLETE CBC W/AUTO DIFF WBC: CPT

## 2024-07-31 PROCEDURE — 2060000000 HC ICU INTERMEDIATE R&B

## 2024-07-31 PROCEDURE — 2700000000 HC OXYGEN THERAPY PER DAY

## 2024-07-31 PROCEDURE — 36600 WITHDRAWAL OF ARTERIAL BLOOD: CPT

## 2024-07-31 PROCEDURE — 36415 COLL VENOUS BLD VENIPUNCTURE: CPT

## 2024-07-31 PROCEDURE — 82805 BLOOD GASES W/O2 SATURATION: CPT

## 2024-07-31 PROCEDURE — 94640 AIRWAY INHALATION TREATMENT: CPT

## 2024-07-31 PROCEDURE — 2580000003 HC RX 258: Performed by: INTERNAL MEDICINE

## 2024-07-31 PROCEDURE — 5A09357 ASSISTANCE WITH RESPIRATORY VENTILATION, LESS THAN 24 CONSECUTIVE HOURS, CONTINUOUS POSITIVE AIRWAY PRESSURE: ICD-10-PCS | Performed by: INTERNAL MEDICINE

## 2024-07-31 PROCEDURE — 6370000000 HC RX 637 (ALT 250 FOR IP)

## 2024-07-31 PROCEDURE — 6370000000 HC RX 637 (ALT 250 FOR IP): Performed by: INTERNAL MEDICINE

## 2024-07-31 PROCEDURE — 84145 PROCALCITONIN (PCT): CPT

## 2024-07-31 PROCEDURE — 80177 DRUG SCRN QUAN LEVETIRACETAM: CPT

## 2024-07-31 PROCEDURE — 94660 CPAP INITIATION&MGMT: CPT

## 2024-07-31 PROCEDURE — 6360000002 HC RX W HCPCS: Performed by: INTERNAL MEDICINE

## 2024-07-31 PROCEDURE — 82140 ASSAY OF AMMONIA: CPT

## 2024-07-31 PROCEDURE — 2580000003 HC RX 258: Performed by: EMERGENCY MEDICINE

## 2024-07-31 RX ORDER — DEXTROSE MONOHYDRATE 100 MG/ML
INJECTION, SOLUTION INTRAVENOUS CONTINUOUS PRN
Status: DISCONTINUED | OUTPATIENT
Start: 2024-07-31 | End: 2024-08-05 | Stop reason: HOSPADM

## 2024-07-31 RX ORDER — IPRATROPIUM BROMIDE AND ALBUTEROL SULFATE 2.5; .5 MG/3ML; MG/3ML
1 SOLUTION RESPIRATORY (INHALATION)
Status: DISCONTINUED | OUTPATIENT
Start: 2024-07-31 | End: 2024-08-01

## 2024-07-31 RX ADMIN — LEVETIRACETAM 250 MG: 250 TABLET, FILM COATED ORAL at 10:50

## 2024-07-31 RX ADMIN — POLYETHYLENE GLYCOL 3350 17 G: 17 POWDER, FOR SOLUTION ORAL at 19:51

## 2024-07-31 RX ADMIN — ASPIRIN 81 MG: 81 TABLET, COATED ORAL at 10:50

## 2024-07-31 RX ADMIN — ATORVASTATIN CALCIUM 20 MG: 20 TABLET, FILM COATED ORAL at 19:48

## 2024-07-31 RX ADMIN — CALCIUM CARBONATE 600 MG (1,500 MG)-VITAMIN D3 400 UNIT TABLET 1 TABLET: at 19:48

## 2024-07-31 RX ADMIN — TAMSULOSIN HYDROCHLORIDE 0.4 MG: 0.4 CAPSULE ORAL at 19:48

## 2024-07-31 RX ADMIN — SACUBITRIL AND VALSARTAN 1 TABLET: 24; 26 TABLET, FILM COATED ORAL at 19:48

## 2024-07-31 RX ADMIN — SODIUM CHLORIDE, PRESERVATIVE FREE 10 ML: 5 INJECTION INTRAVENOUS at 10:51

## 2024-07-31 RX ADMIN — SODIUM CHLORIDE: 9 INJECTION, SOLUTION INTRAVENOUS at 01:33

## 2024-07-31 RX ADMIN — VENLAFAXINE 37.5 MG: 37.5 TABLET ORAL at 10:54

## 2024-07-31 RX ADMIN — Medication: at 10:52

## 2024-07-31 RX ADMIN — SACUBITRIL AND VALSARTAN 1 TABLET: 24; 26 TABLET, FILM COATED ORAL at 10:50

## 2024-07-31 RX ADMIN — SPIRONOLACTONE 12.5 MG: 25 TABLET ORAL at 10:50

## 2024-07-31 RX ADMIN — OXYBUTYNIN CHLORIDE 5 MG: 5 TABLET, EXTENDED RELEASE ORAL at 19:48

## 2024-07-31 RX ADMIN — MIRTAZAPINE 7.5 MG: 15 TABLET, FILM COATED ORAL at 19:48

## 2024-07-31 RX ADMIN — METOPROLOL SUCCINATE 50 MG: 50 TABLET, EXTENDED RELEASE ORAL at 10:51

## 2024-07-31 RX ADMIN — OXYBUTYNIN CHLORIDE 5 MG: 5 TABLET, EXTENDED RELEASE ORAL at 10:51

## 2024-07-31 RX ADMIN — WATER 40 MG: 1 INJECTION INTRAMUSCULAR; INTRAVENOUS; SUBCUTANEOUS at 15:27

## 2024-07-31 RX ADMIN — IPRATROPIUM BROMIDE AND ALBUTEROL SULFATE 1 DOSE: .5; 2.5 SOLUTION RESPIRATORY (INHALATION) at 15:26

## 2024-07-31 RX ADMIN — WATER 40 MG: 1 INJECTION INTRAMUSCULAR; INTRAVENOUS; SUBCUTANEOUS at 22:17

## 2024-07-31 RX ADMIN — ENOXAPARIN SODIUM 40 MG: 100 INJECTION SUBCUTANEOUS at 10:48

## 2024-07-31 RX ADMIN — VENLAFAXINE 37.5 MG: 37.5 TABLET ORAL at 19:58

## 2024-07-31 RX ADMIN — FUROSEMIDE 20 MG: 20 TABLET ORAL at 10:50

## 2024-07-31 RX ADMIN — Medication: at 19:48

## 2024-07-31 RX ADMIN — Medication 1 TABLET: at 10:51

## 2024-07-31 RX ADMIN — LEVETIRACETAM 250 MG: 250 TABLET, FILM COATED ORAL at 19:54

## 2024-07-31 NOTE — PROGRESS NOTES
07/31/24 1545   Encounter Summary   Encounter Overview/Reason Initial Encounter   Service Provided For Patient not available  (Staff with PT)

## 2024-07-31 NOTE — PROGRESS NOTES
Patient would not let CTP get a full set of vitals at this time. Patient acting aggressive and threatening to harm staff.     Writer will continue to monitor needs and repeat vitals at a different time.

## 2024-07-31 NOTE — CONSULTS
[facility] Neurology   IN-PATIENT SERVICE   [facility]    Inpatient Neurology Consult Note             Date:   7/31/2024  Patient name:  Michael Mcghee  Date of admission:  7/30/2024 11:18 AM  MRN:   [id number]  Account:  [id number]  YOB: 1935  PCP:    Bruno Metcalf APRN - CNP  [id number]  Code Status:    DNR-CCA  Chief Complaint:     Chief Complaint   Patient presents with    Altered Mental Status   Neurology Consulted 7/31/24 afternoon for concern of acute delirium and recent evaluation for seizures started on keppra   History Obtained From:   patient, electronic medical record  History of Present Illness:   The patient is a 88 y.o.  Right handed  male who presents with Altered Mental Status and he is admitted to the hospital for the management of metabolic encephalopathy. PMH significant for hypertension, hyperlipidemia, CAD S/P PCI with 5 stents and CABG X3 vessel bypass 1990, DM2, CHF, previous subdural hematoma 9/2019.  Patient recently evaluated at [facility] 7/17/2024 at that time concern for similar admission with altered mental status.  Per neurology note at that time appears patient is at similar baseline as that admission.  Poorly cooperative irritable with waxing waning mentation and fairly limited mobility and chronic progressive debility overall.  Per their documentation patient was previously noted on Keppra in 2019 and EEG at that time with diffuse slowing only.  Given concern for previous right craniotomy with diffuse cerebral atrophy and high suspicion for complex partial seizures patient was resumed back on Keppra 500 mg twice daily.  Appears during that admission patient had significant aggression and became angry following initiation of Keppra refusing any further workup or management in fact requiring IV Keppra further recommendation.    Radiology Review and Neurologic History:   -Patient initial neurology care appears  12:08 PM   Result Value Ref Range    Troponin, High Sensitivity 72 (HH) 0 - 22 ng/L   Ammonia    Collection Time: 07/30/24 12:08 PM   Result Value Ref Range    Ammonia 22 16 - 60 umol/L   Troponin Now and Q 1 Hour    Collection Time: 07/30/24  1:14 PM   Result Value Ref Range    Troponin, High Sensitivity 73 (HH) 0 - 22 ng/L   Urinalysis with Reflex to Culture    Collection Time: 07/30/24  3:27 PM    Specimen: Urine, clean catch   Result Value Ref Range    Color, UA Yellow Yellow    Turbidity UA Clear Clear    Glucose, Ur MOD (A) NEGATIVE mg/dL    Bilirubin, Urine NEGATIVE NEGATIVE    Ketones, Urine NEGATIVE NEGATIVE mg/dL    Specific Gravity, UA 1.014 1.000 - 1.030    Urine Hgb MOD (A) NEGATIVE    pH, Urine 6.0 5.0 - 8.0    Protein, UA 2+ (A) NEGATIVE mg/dL    Urobilinogen, Urine Normal 0.0 - 1.0 EU/dL    Nitrite, Urine NEGATIVE NEGATIVE    Leukocyte Esterase, Urine NEGATIVE NEGATIVE   Microscopic Urinalysis    Collection Time: 07/30/24  3:27 PM   Result Value Ref Range    WBC, UA 0 TO 2 (A) 0 TO 5 /HPF    RBC, UA 21 TO 50 (A) 0 TO 2 /HPF    Casts UA 3 to 5 (A) None /LPF    Epithelial Cells, UA 0 TO 2 /HPF    Bacteria, UA None None   Troponin    Collection Time: 07/30/24 10:11 PM   Result Value Ref Range    Troponin, High Sensitivity 65 (HH) 0 - 22 ng/L   Culture, Blood 1    Collection Time: 07/31/24 12:16 AM    Specimen: Blood   Result Value Ref Range    Specimen Description .BLOOD     Special Requests          Culture NO GROWTH <24 HRS    Procalcitonin    Collection Time: 07/31/24 12:17 AM   Result Value Ref Range    Procalcitonin 0.15 (H) <0.09 ng/mL   CBC with Auto Differential    Collection Time: 07/31/24  5:22 AM   Result Value Ref Range    WBC 5.3 3.5 - 11.0 k/uL    RBC 4.02 (L) 4.5 - 5.9 m/uL    Hemoglobin 12.1 (L) 13.5 - 17.5 g/dL    Hematocrit 37.4 (L) 41 - 53 %    MCV 93.0 80 - 100 fL    MCH 30.1 26 - 34 pg    MCHC 32.4 31 - 37 g/dL    RDW 14.3 11.5 - 14.9 %    Platelets 159 150 - 450 k/uL    MPV 9.3 6.0

## 2024-07-31 NOTE — PLAN OF CARE
Problem: Discharge Planning  Goal: Discharge to home or other facility with appropriate resources  7/31/2024 0157 by Mi William RN  Outcome: Progressing  7/31/2024 0156 by Mi William RN  Outcome: Progressing     Problem: Skin/Tissue Integrity  Goal: Absence of new skin breakdown  Description: 1.  Monitor for areas of redness and/or skin breakdown  2.  Assess vascular access sites hourly  3.  Every 4-6 hours minimum:  Change oxygen saturation probe site  4.  Every 4-6 hours:  If on nasal continuous positive airway pressure, respiratory therapy assess nares and determine need for appliance change or resting period.  7/31/2024 0157 by Mi William RN  Outcome: Progressing  Note: Skin intact.  Waffle mattress in place.  Assisted with turning and repositioning  7/31/2024 0156 by Mi William RN  Outcome: Progressing     Problem: Safety - Adult  Goal: Free from fall injury  7/31/2024 0157 by Mi William RN  Outcome: Progressing  Note: Patient weak.  Confused and agitated at times. Physically aggressive at times.Bed alarm and close to nurses station.  7/31/2024 0156 by Mi William RN  Outcome: Progressing     Problem: ABCDS Injury Assessment  Goal: Absence of physical injury  7/31/2024 0157 by Mi William RN  Outcome: Progressing  7/31/2024 0156 by Mi William RN  Outcome: Progressing     Problem: Infection - Adult  Goal: Absence of infection at discharge  Outcome: Progressing  Note: Afebrile.  BC and prolactin  level drawn.  IV fluids continue.  Goal: Absence of infection during hospitalization  Outcome: Progressing

## 2024-07-31 NOTE — DISCHARGE INSTR - COC
Continuity of Care Form    Patient Name: Michael Mcghee   :  1935  MRN:  [id number]    Admit date:  2024  Discharge date:  2024      Code Status Order: DNR-CCA   Advance Directives:     Admitting Physician:  Valente León MD  PCP: Bruno Metcalf, KINDRA Viveros CNP    Discharging Nurse:   Discharging Hospital Unit/Room#: 2099/2099-01  Discharging Unit Phone Number: [phone]    Emergency Contact:   Extended Emergency Contact Information  Primary Emergency Contact: Yari Briceño  Mobile Phone: [phone]  Relation: Niece/Nephew    Past Surgical History:  Past Surgical History:   Procedure Laterality Date    ANKLE SURGERY Bilateral     RIGHT THEN LEFT- PINS    CARDIAC CATHETERIZATION  2024    DR GUEVARA    CARDIAC PROCEDURE N/A 3/15/2024    Left heart cath / coronary angiography performed by Alvarez Gillespie MD at [facility] CARDIAC CATH LAB    CARDIAC SURGERY      TRIPLE BYPASS    CARDIAC SURGERY      STENTS X 5    JOINT REPLACEMENT Right     KNEE REPLACEMENT    NERVE BLOCK  2014    Duramorph celestone 9 mg       Immunization History:   Immunization History   Administered Date(s) Administered    COVID-19, PFIZER PURPLE top, DILUTE for use, (age 12 y+), 30mcg/0.3mL 2021, 2021, 10/13/2021    COVID-19, PFIZER, ( formula), (age 12y+), IM, 30mcg/0.3mL 10/10/2023    Influenza Virus Vaccine 10/18/2022    Influenza, FLUAD, (age 65 y+), Adjuvanted, 0.5mL 10/13/2021, 10/10/2023    Influenza, High Dose (Fluzone 65 yrs and older) 10/23/2014    Pneumococcal, PCV-13, PREVNAR 13, (age 6w+), IM, 0.5mL 2016    TDaP, ADACEL (age 10y-64y), BOOSTRIX (age 10y+), IM, 0.5mL 02/15/2022       Active Problems:  Patient Active Problem List   Diagnosis Code    Spinal stenosis, lumbar region, without neurogenic claudication M48.061    Arthropathy, unspecified, other specified sites M12.9    CAD (coronary artery disease) I25.10    Primary hypertension I10    Atherosclerosis of autologous vein

## 2024-07-31 NOTE — PROGRESS NOTES
[facility] Internal Medicine  Aquiles Taylor MD; Franki Hung MD; Valente León MD; MD Summer Harp MD; Aure Morales MD  [facility] Internal Medicine   IN-PATIENT SERVICE  [facility]                 Date:   7/30/2024  Patientname:  Michael Mcghee  Date of admission:  7/30/2024 11:18 AM  MRN:   [id number]  Account:  [id number]  YOB: 1935  PCP:    Bruno Metcalf APRN - CNP  Room:   2099/2099-01  Code Status:    DNR-CCA      Chief Complaint:     Chief Complaint   Patient presents with    Altered Mental Status       History of Present Illness:     Michael Mcghee is a 88 y.o. Non- / non  male with a history of CAD status post CABG 1990.  Cardiac cath 2024, hypertension, hyperlipidemia, subdural hematoma, and CHF who presents with and is admitted to the hospital for the management of Delirium.    According to patient, he is feeling quite well and is in no pain at this time.  The patient is oriented to self only.  According to the ER provider he has been increasingly disoriented at his extended care facility.  He was recently admitted and intubated for sepsis at another facility.  His imaging was negative for any acute findings.  There is concern for metabolic encephalopathy related to dehydration and NADIR.  His CODE STATUS is a DNR CCA.  Intubation allowed.  CPR is not allowed.  See documentation in the patient's chart.    During assessment the patient is pleasantly confused.  Cranial nerves II through VII appear grossly intact.  He is able to follow commands.  His heart sounds are S1, S2 no murmurs or rubs noted.  His pulmonary effort is normal and unlabored.  No adventitious breath sounds.  His abdomen is soft and nontender.  Both upper extremity  are strong bilaterally.  Both the patient's plantarflexion and dorsiflexion are bilaterally strong.     Upon presentation to the ER, his creatinine was 1.7; his base creatinine is 1.0.  His  03/14/2024    DR GUEVARA    CARDIAC PROCEDURE N/A 3/15/2024    Left heart cath / coronary angiography performed by Alvarez Gillespie MD at [facility] CARDIAC CATH LAB    CARDIAC SURGERY      TRIPLE BYPASS    CARDIAC SURGERY      STENTS X 5    JOINT REPLACEMENT Right     KNEE REPLACEMENT    NERVE BLOCK  01/21/2014    Duramorph celestone 9 mg        Medications Prior to Admission:     Prior to Admission medications    Medication Sig Start Date End Date Taking? Authorizing Provider   levETIRAcetam (KEPPRA) 250 MG tablet Take 1 tablet by mouth 2 times daily   Yes Evangelista Buck MD   calcium carbonate (OSCAL) 500 MG TABS tablet Take 1 tablet by mouth at bedtime   Yes Evangelista Buck MD   sacubitril-valsartan (ENTRESTO) 24-26 MG per tablet Take 1 tablet by mouth 2 times daily 7/10/24   Paulie Guevara MD   spironolactone (ALDACTONE) 25 MG tablet Take 0.5 tablets by mouth daily 7/10/24   Paulie Guevara MD   nitroGLYCERIN (NITROSTAT) 0.4 MG SL tablet Place 1 tablet under the tongue every 5 minutes as needed for Chest pain up to max of 3 total doses. If no relief after 1 dose, call 911. 7/10/24   Paulie Guevara MD   albuterol (PROVENTIL) (2.5 MG/3ML) 0.083% nebulizer solution Take 3 mLs by nebulization 4 times daily for 10 doses  Patient not taking: Reported on 7/10/2024 7/9/24 7/12/24  Bruno Metcalf APRN - CNP   venlafaxine (EFFEXOR) 37.5 MG tablet Take 1 tablet by mouth 2 times daily 7/9/24   Bruno Metcalf APRN - CNP   dapagliflozin (FARXIGA) 10 MG tablet Take 1 tablet by mouth every morning 6/18/24   Verna Martinez APRN - CNP   polyethylene glycol (GLYCOLAX) 17 g packet Take 1 packet by mouth at bedtime    Evangelista Buck MD   nystatin (MYCOSTATIN) 303474 UNIT/GM powder Apply topically 2 times daily Apply topically 2 times daily.  Patient not taking: Reported on 7/30/2024    Evangelista Buck MD   ammonium lactate (LAC-HYDRIN) 12 % lotion Apply topically 2 times daily Apply to legs

## 2024-07-31 NOTE — PROGRESS NOTES
Physical Therapy        Physical Therapy Cancel Note      DATE: 2024    NAME: Michael Mcghee  MRN: [id number]   : 1935      Patient not seen this date for Physical Therapy due to:    2024 at [phone]  HOLD PT evaluation.  Pt sleeping very soundly and difficult to arouse.  Pt will open eyes only briefly when name is called and then will return sleeping.  Will check status in the afternoon to see if pt can participate in PT evaluation.       Electronically signed by Kori Barry PT on 2024 at 9:58 AM

## 2024-07-31 NOTE — FLOWSHEET NOTE
07/31/24 0433   Treatment Team Notification   Reason for Communication Review case   Name of Team Member Notified EDINSON Dodd NP   Treatment Team Role Advanced Practice Nurse   Method of Communication Secure Message   Response Waiting for response   Notification Time 0433     Patient is aggressive. And balling up fists, trying to hit staff when care is attempted. Concerned now because he is not answering any questions and is basically nonverbal. Thanks. Latest vitals: T 97, HR 78, /53, O2 98%     0500 - Teresita Dodd NP in to assess patient. Noted that all extremities are moving strongly and has had similar behavior in previous admissions. Patient still not currently answering staff. No new orders at this time.

## 2024-07-31 NOTE — PROGRESS NOTES
Dr. León notified of blood glucose 73, patient not eating or drinking. Hypoglycemia protocol ordered, jardiance on hold.

## 2024-07-31 NOTE — FLOWSHEET NOTE
07/31/24 0627   Treatment Team Notification   Reason for Communication Review case   Name of Team Member Notified Dr. Ivan Ratliff   Treatment Team Role Consulting Provider   Method of Communication Secure Message   Response Waiting for response   Notification Time 0628     New consult. Admitted from ER last night. Delirium and hx of seizures. Currently on Keppra. Thanks

## 2024-07-31 NOTE — PROGRESS NOTES
Patient became more alert after wearing Bi-Pap for about 2 hours. Took Bi-Pap off himself and refusing to have it replaced or nasal cannula. Maintaining adequate O2 saturation. Dr. Dinero notified and stated to monitor oxygen and LOC. Encourage Bi-PAP use at night.

## 2024-07-31 NOTE — PROGRESS NOTES
Patient agitated and anxious with care.  Balling up fists and threatening to hit PCT's when VS were done.  Confused and  states \"I want the police called\".  Unable to reorient.  Unable at this time to change PICC line dressing due to agitation.

## 2024-07-31 NOTE — PROGRESS NOTES
[facility]   OCCUPATIONAL THERAPY MISSED TREATMENT NOTE   INPATIENT   Date: 24  Patient Name: Michael Mcghee       Room:   MRN: [id number]   Account #: [id number]    : 1935  (88 y.o.)  Gender: male                 REASON FOR MISSED TREATMENT:  24    -   Other - pt unable to be roused despite multiple verbal and tactile cues. Pt briefly opened eyes but immediately closed them and began snoring. Multiple attempts to wake however pt continues to sleep soundly. OT will continue to follow and attempt as able.      [id number]         Electronically signed by ZEYAD Owens on 24 at 10:04 AM EDT

## 2024-07-31 NOTE — PLAN OF CARE
Problem: Skin/Tissue Integrity  Goal: Absence of new skin breakdown  Description: 1.  Monitor for areas of redness and/or skin breakdown  2.  Assess vascular access sites hourly  3.  Every 4-6 hours minimum:  Change oxygen saturation probe site  4.  Every 4-6 hours:  If on nasal continuous positive airway pressure, respiratory therapy assess nares and determine need for appliance change or resting period.  7/31/2024 1459 by Sonia Jenkins RN  Outcome: Progressing  Note: No new breakdown noted on exam   7/31/2024 0157 by Mi William RN  Outcome: Progressing  Note: Skin intact.  Waffle mattress in place.  Assisted with turning and repositioning  7/31/2024 0156 by Mi William RN  Outcome: Progressing     Problem: Safety - Adult  Goal: Free from fall injury  7/31/2024 1459 by Sonia Jenkins RN  Outcome: Progressing  Flowsheets (Taken 7/31/2024 1456)  Free From Fall Injury:   Instruct family/caregiver on patient safety   Based on caregiver fall risk screen, instruct family/caregiver to ask for assistance with transferring infant if caregiver noted to have fall risk factors  Note: Patient has been more drowsy today, safety issues in regards to getting up has not been a problem   7/31/2024 0157 by Mi William RN  Outcome: Progressing  Note: Patient weak.  Confused and agitated at times. Physically aggressive at times.Bed alarm and close to nurses station.  7/31/2024 0156 by Mi William RN  Outcome: Progressing     Problem: Infection - Adult  Goal: Absence of infection at discharge  7/31/2024 1459 by Sonia Jenkins RN  Outcome: Progressing  Flowsheets (Taken 7/31/2024 1456)  Absence of infection at discharge:   Assess and monitor for signs and symptoms of infection   Administer medications as ordered  Note: Patient's WBC's have been within normal limits  7/31/2024 0157 by Mi William RN  Outcome: Progressing  Note: Afebrile.  BC and prolactin  level drawn.  IV fluids

## 2024-07-31 NOTE — H&P
[facility] Internal Medicine  Aquiles Taylor MD; Franki Hung MD; Valente León MD; MD Summer Harp MD; Aure Morales MD    [facility] Internal Medicine   IN-PATIENT SERVICE   [facility]    HISTORY AND PHYSICAL EXAMINATION            Date:   7/31/2024  Patient name:  Michael Mcghee  Date of admission:  7/30/2024 11:18 AM  MRN:   [id number]  Account:  [id number]  YOB: 1935  PCP:    Bruno Metcalf APRN - CNP  Room:   2099/2099-01  Code Status:    DNR-CCA    Chief Complaint:     Chief Complaint   Patient presents with    Altered Mental Status   Pt medical record and nursing staff    History Obtained From:     Pt medical record and nursing staff    History of Present Illness:     Michael Mcghee is a 88 y.o. Non- / non  male who presents with Altered Mental Status   and is admitted to the hospital for the management of Delirium.    Michael Mcghee is a 88 y.o. Non- / non  male with a history of CAD status post CABG 1990.  Cardiac cath 2024, hypertension, hyperlipidemia, subdural hematoma, and CHF who presents with and is admitted to the hospital for the management of Delirium.     According to patient, he is feeling quite well and is in no pain at this time.  The patient is oriented to self only.  According to the ER provider he has been increasingly disoriented at his extended care facility.  He was recently admitted and intubated for sepsis at another facility.  His imaging was negative for any acute findings.  There is concern for metabolic encephalopathy related to dehydration and NADIR.  His CODE STATUS is a DNR CCA.  Intubation allowed.  CPR is not allowed.  See documentation in the patient's chart.     During assessment the patient is pleasantly confused.  Cranial nerves II through VII appear grossly intact.  He is able to follow commands.  His heart sounds are S1, S2 no murmurs or rubs noted.  His  pulmonary effort is normal and unlabored.  No adventitious breath sounds.  His abdomen is soft and nontender.  Both upper extremity  are strong bilaterally.  Both the patient's plantarflexion and dorsiflexion are bilaterally strong.      Upon presentation to the ER, his creatinine was 1.7; his base creatinine is 1.0.  His ammonia was 22 his troponin was 72, 78.  His EKG showed normal sinus rhythm with sinus arrhythmia.  His base troponin is 42.  Moderate urine hemoglobin was noted.  The patient presented with a PICC line in his right arm.  Chart review reveals that the patient was recently admitted to the [facility] with altered mental status.  He was intubated due to hypoxia respiratory failure due to septic shock and community-acquired pneumonia.  Upon discharge he was to be on vancomycin until July 29, 2024.  Cefepime 2000 mg was ordered every 12 hours for 9 days starting 7/11/2024.  The blood cultures were positive for growth.  Furthermore there was concern that the patient was having new onset seizures.  Neurology started patient on Keppra at that time per the patient's power of .    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

## 2024-07-31 NOTE — CARE COORDINATION
Case Management Assessment  Initial Evaluation    Date/Time of Evaluation: 7/31/2024 1:23 PM  Assessment Completed by: Kalpana Wilson RN    If patient is discharged prior to next notation, then this note serves as note for discharge by case management.    Patient Name: Michael Mcghee                   YOB: 1935  Diagnosis: Metabolic encephalopathy [G93.41]  Confusion [R41.0]  Delirium [R41.0]  NADIR (acute kidney injury) (HCC) [N17.9]                   Date / Time: 7/30/2024 11:18 AM    Patient Admission Status: Inpatient   Readmission Risk (Low < 19, Mod (19-27), High > 27): Readmission Risk Score: 17.4    Current PCP: Bruno Metcalf APRN - CNP  PCP verified by CM? No    Chart Reviewed: Yes      History Provided by: Medical Record  Patient Orientation: Person    Patient Cognition: Alert    Hospitalization in the last 30 days (Readmission):  No    If yes, Readmission Assessment in CM Navigator will be completed.    Advance Directives:      Code Status: DNR-CCA   Patient's Primary Decision Maker is: Legal Next of Kin    Primary Decision Maker: Yari Briceño - Niece/Nephew - [phone]    Discharge Planning:    Patient lives with: Alone Type of Home: Skilled Nursing Facility  Primary Care Giver: Other (Comment) (SNF)  Patient Support Systems include: Family Members   Current Financial resources: Medicare  Current community resources: None  Current services prior to admission: Skilled Nursing Facility            Current DME:              Type of Home Care services:  None    ADLS  Prior functional level:    Current functional level:      PT AM-PAC:   /24  OT AM-PAC:   /24    Family can provide assistance at DC: Other (comment) (Unknown at this time.)  Would you like Case Management to discuss the discharge plan with any other family members/significant others, and if so, who?    Plans to Return to Present Housing: Yes  Other Identified Issues/Barriers to RETURNING to current housing: None  Potential  Assistance needed at discharge: Skilled Nursing Facility            Potential DME:    Patient expects to discharge to: Skilled nursing facility  Plan for transportation at discharge: Other (see comment) (Stretcher)    Financial    Payor: MEDICARE / Plan: MEDICARE PART A AND B / Product Type: *No Product type* /     Does insurance require precert for SNF: No    Potential assistance Purchasing Medications: No  Meds-to-Beds request: No      [facility] - [place], [place] - [address] - P [phone] - F [phone]  [address] [place] [address]  Phone: [phone] Fax: [phone]    [id number] [place], [place] - [address] [phone] - F [phone]  [address] [place] [address]  Phone: [phone] Fax: [phone]    [place], [place] - [address] [phone] - F [phone]  [address] [place] [address] [place] [address]  Phone: [phone] Fax: [phone]      Notes:    Factors facilitating achievement of predicted outcomes: Family support    Barriers to discharge: None    Additional Case Management Notes: 7/31/24 Medicare Pt.,Alert to Person, Is Coming from [facility], He is Bed hold, LTC, Unable to get in touch w/ family, unable to complete ACP, PT/OT, Neuro, CXR, Entresto, EEG, Neuro, Bimal will need signed/completed//KB    The Plan for Transition of Care is related to the following treatment goals of Metabolic encephalopathy [G93.41]  Confusion [R41.0]  Delirium [R41.0]  NADIR (acute kidney injury) (HCC) [N17.9]    IF APPLICABLE: The Patient and/or patient representative Michael and his family were provided with a choice of provider and agrees with the discharge plan. Freedom of choice list with basic dialogue that supports the patient's individualized plan of care/goals and shares the quality data associated with the providers was provided to:     Patient Representative Name:       The Patient and/or Patient Representative Agree

## 2024-07-31 NOTE — PROGRESS NOTES
Received call from MIRIAM Boyle's reviewed, orders received for intermittent Bi-PAP, does not need to transfer at this time.

## 2024-07-31 NOTE — PROGRESS NOTES
Patient WANDA Greenberg called in  to unit to inquire about patient orders and antibiotic therapy. Yari expressed many questions concerning the patient's care and orders.  Tresa SIMMONS notified and given Yari\"s phone number.  Tresa states she will contact Yari and explain orders and answer questions.

## 2024-08-01 ENCOUNTER — APPOINTMENT (OUTPATIENT)
Dept: GENERAL RADIOLOGY | Age: 89
DRG: 682 | End: 2024-08-01
Payer: MEDICARE

## 2024-08-01 PROBLEM — F01.B3 MODERATE VASCULAR DEMENTIA WITH MOOD DISTURBANCE (HCC): Status: ACTIVE | Noted: 2024-08-01

## 2024-08-01 PROBLEM — G40.009 LOCALIZATION-RELATED (FOCAL) (PARTIAL) IDIOPATHIC EPILEPSY AND EPILEPTIC SYNDROMES WITH SEIZURES OF LOCALIZED ONSET, NOT INTRACTABLE, WITHOUT STATUS EPILEPTICUS (HCC): Status: ACTIVE | Noted: 2024-08-01

## 2024-08-01 PROBLEM — Z86.79 HISTORY OF SUBDURAL HEMATOMA: Status: ACTIVE | Noted: 2024-08-01

## 2024-08-01 PROBLEM — G93.41 METABOLIC ENCEPHALOPATHY: Status: ACTIVE | Noted: 2024-08-01

## 2024-08-01 LAB — GLUCOSE BLD-MCNC: 180 MG/DL (ref 75–110)

## 2024-08-01 PROCEDURE — 2580000003 HC RX 258: Performed by: INTERNAL MEDICINE

## 2024-08-01 PROCEDURE — 6360000002 HC RX W HCPCS: Performed by: INTERNAL MEDICINE

## 2024-08-01 PROCEDURE — 6370000000 HC RX 637 (ALT 250 FOR IP)

## 2024-08-01 PROCEDURE — 99222 1ST HOSP IP/OBS MODERATE 55: CPT | Performed by: NURSE PRACTITIONER

## 2024-08-01 PROCEDURE — 6370000000 HC RX 637 (ALT 250 FOR IP): Performed by: INTERNAL MEDICINE

## 2024-08-01 PROCEDURE — 71045 X-RAY EXAM CHEST 1 VIEW: CPT

## 2024-08-01 PROCEDURE — 99223 1ST HOSP IP/OBS HIGH 75: CPT | Performed by: STUDENT IN AN ORGANIZED HEALTH CARE EDUCATION/TRAINING PROGRAM

## 2024-08-01 PROCEDURE — 73600 X-RAY EXAM OF ANKLE: CPT

## 2024-08-01 PROCEDURE — 94660 CPAP INITIATION&MGMT: CPT

## 2024-08-01 PROCEDURE — 82947 ASSAY GLUCOSE BLOOD QUANT: CPT

## 2024-08-01 PROCEDURE — 99232 SBSQ HOSP IP/OBS MODERATE 35: CPT | Performed by: INTERNAL MEDICINE

## 2024-08-01 PROCEDURE — 2060000000 HC ICU INTERMEDIATE R&B

## 2024-08-01 RX ORDER — PREDNISONE 20 MG/1
20 TABLET ORAL DAILY
Status: DISCONTINUED | OUTPATIENT
Start: 2024-08-02 | End: 2024-08-02

## 2024-08-01 RX ORDER — ENOXAPARIN SODIUM 100 MG/ML
30 INJECTION SUBCUTANEOUS 2 TIMES DAILY
Status: DISCONTINUED | OUTPATIENT
Start: 2024-08-01 | End: 2024-08-05 | Stop reason: HOSPADM

## 2024-08-01 RX ORDER — LACOSAMIDE 100 MG/1
100 TABLET ORAL 2 TIMES DAILY
Status: DISCONTINUED | OUTPATIENT
Start: 2024-08-01 | End: 2024-08-05 | Stop reason: HOSPADM

## 2024-08-01 RX ORDER — IPRATROPIUM BROMIDE AND ALBUTEROL SULFATE 2.5; .5 MG/3ML; MG/3ML
1 SOLUTION RESPIRATORY (INHALATION) EVERY 4 HOURS PRN
Status: DISCONTINUED | OUTPATIENT
Start: 2024-08-01 | End: 2024-08-05 | Stop reason: HOSPADM

## 2024-08-01 RX ORDER — LACOSAMIDE 100 MG/1
100 TABLET ORAL 2 TIMES DAILY
Qty: 60 TABLET | Refills: 5 | Status: SHIPPED | OUTPATIENT
Start: 2024-08-01 | End: 2024-08-05

## 2024-08-01 RX ADMIN — SACUBITRIL AND VALSARTAN 1 TABLET: 24; 26 TABLET, FILM COATED ORAL at 20:28

## 2024-08-01 RX ADMIN — WATER 40 MG: 1 INJECTION INTRAMUSCULAR; INTRAVENOUS; SUBCUTANEOUS at 03:57

## 2024-08-01 RX ADMIN — TAMSULOSIN HYDROCHLORIDE 0.4 MG: 0.4 CAPSULE ORAL at 20:28

## 2024-08-01 RX ADMIN — LEVETIRACETAM 250 MG: 250 TABLET, FILM COATED ORAL at 08:46

## 2024-08-01 RX ADMIN — Medication 1 TABLET: at 08:46

## 2024-08-01 RX ADMIN — LEVETIRACETAM 250 MG: 250 TABLET, FILM COATED ORAL at 20:28

## 2024-08-01 RX ADMIN — METOPROLOL SUCCINATE 50 MG: 50 TABLET, EXTENDED RELEASE ORAL at 08:46

## 2024-08-01 RX ADMIN — SODIUM CHLORIDE: 9 INJECTION, SOLUTION INTRAVENOUS at 20:37

## 2024-08-01 RX ADMIN — CALCIUM CARBONATE 600 MG (1,500 MG)-VITAMIN D3 400 UNIT TABLET 1 TABLET: at 20:28

## 2024-08-01 RX ADMIN — OXYBUTYNIN CHLORIDE 5 MG: 5 TABLET, EXTENDED RELEASE ORAL at 20:28

## 2024-08-01 RX ADMIN — VENLAFAXINE 37.5 MG: 37.5 TABLET ORAL at 20:28

## 2024-08-01 RX ADMIN — VENLAFAXINE 37.5 MG: 37.5 TABLET ORAL at 08:48

## 2024-08-01 RX ADMIN — SPIRONOLACTONE 12.5 MG: 25 TABLET ORAL at 08:45

## 2024-08-01 RX ADMIN — WATER 40 MG: 1 INJECTION INTRAMUSCULAR; INTRAVENOUS; SUBCUTANEOUS at 08:46

## 2024-08-01 RX ADMIN — FUROSEMIDE 20 MG: 20 TABLET ORAL at 08:46

## 2024-08-01 RX ADMIN — SODIUM CHLORIDE: 9 INJECTION, SOLUTION INTRAVENOUS at 04:02

## 2024-08-01 RX ADMIN — ENOXAPARIN SODIUM 30 MG: 100 INJECTION SUBCUTANEOUS at 20:28

## 2024-08-01 RX ADMIN — SACUBITRIL AND VALSARTAN 1 TABLET: 24; 26 TABLET, FILM COATED ORAL at 08:46

## 2024-08-01 RX ADMIN — ASPIRIN 81 MG: 81 TABLET, COATED ORAL at 08:46

## 2024-08-01 RX ADMIN — OXYBUTYNIN CHLORIDE 5 MG: 5 TABLET, EXTENDED RELEASE ORAL at 08:46

## 2024-08-01 RX ADMIN — MIRTAZAPINE 7.5 MG: 15 TABLET, FILM COATED ORAL at 20:27

## 2024-08-01 RX ADMIN — POLYETHYLENE GLYCOL 3350 17 G: 17 POWDER, FOR SOLUTION ORAL at 20:28

## 2024-08-01 RX ADMIN — ATORVASTATIN CALCIUM 20 MG: 20 TABLET, FILM COATED ORAL at 20:28

## 2024-08-01 NOTE — PROGRESS NOTES
I call the patient's niece Yari Briceño. I update Yari about the POA that the scanned document in the chart is not the POA that gives her the authorization to make any medical decisions for her Uncle. I update that the POA we have scanned into the medical records here covers personal affairs and not to make any medical decisions for him. I explain that without this in place we would follow the law which is next of kin or the majority of the nieces and nephews.     Yari states \" Oh I make all the decisions and no one else will.\" I tell her that unfortunately that cannot be the case if there is no document is place and that we need to follow [facility] law at this time.  I ask for Osvaldo Delaneyy's contact information as that was the patient's request for his HCPOA  document. Yari again states\" oh Andrew won't do it, as they don't even talk.\"   I update Yari that I will call him per the patient's request, and she states that she cannot share that information now as she is driving.   I request that when she can, to please call our office and leave the information on the VM.       Will follow to complete the patient's HCPOA after I have Osvaldo Mcghee( nephew) contact information and that he agrees to be appointed as the patient's voice for medical decisions if he cannot be.       Will follow for goals of care and patient/family support.     ADDENDUM:   I called the [facility] x 2 and there was no answer. I was calling [facility] to see if they had nephew Osvaldo Mcghee listed as a contact.   I go to talk with the patient and I explain my role and that I am following up about his request to complete his HCPOA. I explain in detail that the POA that we have is not for medical decisions. I update that I can complete that, and I ask again who he would want as his decision maker and he states\" my nephew Osvaldo Mcghee.\" I ask his age and address and I do a internet search and get no results. I get his POA documents and

## 2024-08-01 NOTE — ACP (ADVANCE CARE PLANNING)
..Advance Care Planning     Advance Care Planning (ACP) Physician/NP/PA Conversation    Date of Conversation: 7/30/2024  Conducted with: Patient with Decision Making Capacity  Other persons present: None    Healthcare Decision Maker: No healthcare decision makers have been documented.  Click here to complete HealthCare Decision Makers including selection of the Healthcare Decision Maker Relationship (ie \"Primary\")   Today we documented Decision Maker(s) consistent with Legal Next of Kin hierarchy. Will be majority of nieces and nephews. He only has financial legal documents in place.     Care Preferences:    Hospitalization:  \"If your health worsens and it becomes clear that your chance of recovery is unlikely, what would be your preference regarding hospitalization?\"  The patient would prefer hospitalization.    Ventilation:  \"If you were unable to breath on your own and your chance of recovery was unlikely, what would be your preference about the use of a ventilator (breathing machine) if it was available to you?\"  The patient would NOT desire the use of a ventilator.    Resuscitation:  \"In the event your heart stopped as a result of an underlying serious health condition, would you want attempts made to restart your heart, or would you prefer a natural death?\"  No, do NOT attempt to resuscitate.    treatment goals, benefit/burden of treatment options, ventilation preferences, hospitalization preferences, resuscitation preferences, and hospice care    Conversation Outcomes / Follow-Up Plan:  ACP in process - information provided, considering goals and options  Reviewed DNR/DNI and patient confirms current DNR status - completed forms on file (place new order if needed)    Length of Voluntary ACP Conversation in minutes:  <16 minutes (Non-Billable)    MARYCARMEN ALONSO, APRN - CNP

## 2024-08-01 NOTE — PROGRESS NOTES
08/01/24 1543   Encounter Summary   Encounter Overview/Reason Volunteer Encounter   Service Provided For Patient   Last Encounter  08/01/24   Rituals, Rites and Sacraments   Type Hindu Communion   Assessment/Intervention/Outcome   Intervention Prayer (assurance of)/Hope

## 2024-08-01 NOTE — PROGRESS NOTES
Neurology Tech at bedside to obtain EEG. Patient adamantly refusing despite education on importance of testing.

## 2024-08-01 NOTE — CONSULTS
[facility] PULMONARY, CRITICAL CARE & SLEEP   Shon Arriaga MD/ Jonathon ARGUELLES AGACNP-BC NP-C  Karin ARGUELLES NP-C  Hakan ARGUELLES NP-C     CONSULT NOTE      Date of Admission: 7/30/2024 11:18 AM    Chief complaint: AMS    Referring Physician: * No referring provider recorded for this case *  PCP: Bruno Metcalf, KINDRA - CNP     History of Present Illness: Michael Mcghee is a 88 y.o. male initially presenting from [facility] for altered mental status.  He had a recent admission at [facility] from 7/11 through 7/19.  He was admitted at that time for altered mental status.  He was found to be hypoxic and required intubation/mechanical ventilation.  He was subsequently extubated 7/14.  Workup during that admission with CT brain showed no acute findings.  CT chest showed bilateral lower lobe infiltrates/atelectasis, no PE.  CT abdomen and pelvis showed wall thickening of the urinary bladder and constipation.  He had elevated troponin and was started on a heparin drip.  He underwent MRI of the brain with no acute findings.  He was seen by infectious disease during his admission.  Blood culture positive for E. coli on 7/11.  Repeat blood cultures with possible contamination.  Follow-up blood culture prior to discharge with no growth.  Urine culture positive for E. coli.  Respiratory viral panel negative.  MRSA PCR was positive.  Sputum showed rare Pseudomonas.  He was treated with IV cefepime and discharged on vancomycin to continue through July 28.  Initial plan was for repeat blood cultures to be done on August 1.  There was concern for possible seizure-like activity during his admission and he had been started on Keppra.  He did have a prior history of subdural with craniotomy.  He was discharged back to [facility] nursing [facility].    Workup in the ER on 7/30 with CT chest abdomen and pelvis showed a small left-sided pleural effusion with  the stomach.  No gross abnormality seen in the liver, spleen, adrenal glands, pancreas, or gallbladder.  No hydronephrosis of the kidneys.  Minimal prominence of the ureters and diffuse thickening of the urinary bladder, correlate with urinalysis.  Diverticulosis of the   sigmoid, and constipation in the colon, thickening in the rectal wall, cannot exclude impaction.  Diffuse atherosclerotic disease of the abdominal aorta, iliac arteries, and mesenteric vessels.  Calcified plaques compromised the evaluation of the arteries.  No dilated bowel loops or free fluid. IMPRESSION: 1.  Severe mucosal wall thickening of the urinary bladder wall. 2.  Constipation in the colon, and mucosal wall thickening in the rectum, cannot exclude impaction. 3.  Diffuse atherosclerotic disease of the abdominal aorta and mesenteric vessels. Workstation:[id number] Finalized by Aileen Guillermo MD on 7/11/2024 7:56 AM    CT angiogram chest    Result Date: 7/11/2024  CT CTA CHEST:  7/11/2024 5:29 AM CLINICAL INDICATION:  Shortness of breath, intubated. Technique: Automatic radiation exposure lowering techniques were utilized.  Following the intravenous injection of 100 cc of Omnipaque 350 a CTA of the chest and 3-D reformats including  3 -D Maximum intensity projection reconstructions constructed under concurrent physician supervision on a independent workstation . 3 D images obtained to improve visualization of vascular detail. All CT scans at this facility use dose modulation, iterative reconstruction, and/or weight based dosing when appropriate to reduce radiation dose to as low as reasonably achievable. . FINDINGS: Comparison: No comparisons available.  Cardiomegaly and severe atherosclerotic disease and calcifications of the coronary arteries and thoracic aorta.  ET tube and NG tube in place.  Bilateral lower lobes airspace infiltrates and/or atelectasis.  No pleural or pericardial effusions.  The ascending aorta measures 3.8 cm, and the main

## 2024-08-01 NOTE — PLAN OF CARE
Problem: Discharge Planning  Goal: Discharge to home or other facility with appropriate resources  8/1/2024 1205 by Jaelyn Boateng RN  Outcome: Progressing  8/1/2024 0316 by Mi William RN  Outcome: Progressing     Problem: Skin/Tissue Integrity  Goal: Absence of new skin breakdown  Description: 1.  Monitor for areas of redness and/or skin breakdown  2.  Assess vascular access sites hourly  3.  Every 4-6 hours minimum:  Change oxygen saturation probe site  4.  Every 4-6 hours:  If on nasal continuous positive airway pressure, respiratory therapy assess nares and determine need for appliance change or resting period.  8/1/2024 1205 by Jaelyn Boateng RN  Outcome: Progressing  8/1/2024 0316 by Mi William RN  Outcome: Progressing  Note: Skin with multiple echymotic areas.  Buttocks slightly red.  Remains on waffle.  Turned and repositioned frequently.     Problem: Safety - Adult  Goal: Free from fall injury  8/1/2024 1205 by Jaelyn Boateng RN  Outcome: Progressing  8/1/2024 0316 by Mi William RN  Outcome: Progressing  Note: Patient confused.  Weak.  Bed alarm on.  Refusing care at times.     Problem: ABCDS Injury Assessment  Goal: Absence of physical injury  8/1/2024 1205 by Jaelyn Boateng RN  Outcome: Progressing  8/1/2024 0316 by Mi William RN  Outcome: Progressing     Problem: Infection - Adult  Goal: Absence of infection at discharge  8/1/2024 1205 by Jaelyn Boateng RN  Outcome: Progressing  8/1/2024 0316 by Mi William RN  Outcome: Progressing  Note: VS stable.  Afebrile.  Goal: Absence of infection during hospitalization  8/1/2024 1205 by Jaelyn Boateng RN  Outcome: Progressing  8/1/2024 0316 by Mi William RN  Outcome: Progressing

## 2024-08-01 NOTE — PROGRESS NOTES
Dr. Ratliff sent secure message notifying him that patient is refusing EEG despite education on importance of testing.

## 2024-08-01 NOTE — PROGRESS NOTES
Dr. León notified that patient is refusing morning labs, chest xray, and EEG despite education on reasoning and importance.

## 2024-08-01 NOTE — PROGRESS NOTES
Bipap on standby at this time.  Pulse Ox-- 98% r/a  Pt wore last night.   Skin score--0      Nasal gel pad available at bedside.  Pt awake/alert/no distress noted.

## 2024-08-01 NOTE — CONSULTS
..  Palliative Care Inpatient Consult    NAME:  Michael Mcghee  MEDICAL RECORD NUMBER:  [id number]  AGE: 88 y.o.   GENDER: male  : 1935  TODAY'S DATE:  2024    Reasons for Consultation:    Symptom and/or pain management  Provision of information regarding PC and/or hospice philosophies  Complex, time-intensive communication and interdisciplinary psychosocial support  Clarification of goals of care and/or assistance with difficult decision-making  Guidance in regards to resources and transition(s)    Members of PC team contributing to this consultation are : Kelin Reynoso, Palliative Care APRN-CNP    Plan      Palliative Interaction: I went to see patient and he was lying in bed awake. No family present. He is oriented to self, place, and time. He confused year at first but then corrected himself. He is able to carry conversation.     We discussed chronic history and he from nursing home. He reports that he lost his wife in the last year. He reports that it's been awhile since he has been able to ambulate. He recalls be at [facility] and on vent treated for infection. He has significant CAD history.     We discussed current hospitalized problems and patient doesn't really recall why he is here. He was originally brought in for AMS but this seems to be clearing. He was found to be dry and is on IV fluids. We discussed NADIR, there is no new labs today to compare. He complains of R ankle pain. He appears to have swelling to this ankle. Will get X-ray. He denies injury.     I discussed goals and current code status was explained. He states \"I am not doing that again when discussing intubation.\" He reports wanting external 02 only. He reports losing most of his family. All 9 siblings are . He seems to be wanting somewhere between DNRCC and DNRCC-A. He is agreeable to DNRCC-A no intubation but reports limitations.     I discussed palliative care vs hospice care. He seems to say he would consider hospice care      ADVANCED CARE PLANNING:  Patient has capacity for medical decisions: yes  Health Care Power of : no  Living Will: no     Personal, Social, and Family History  Marital Status:   Living situation:nursing home  Importance of leta/Taoist/spiritual beliefs: [] Very [] Somewhat [] Not   Psychological Distress: mild  Does patient understand diagnosis/treatment? yes  Does caregiver understand diagnosis/treatment? not asked      Assessment        REVIEW OF SYSTEMS  Constitutional: no fever, no chills or weight loss  Eyes: no eye pain or blurred vision  ENT: no hearing loss, congestion, or difficulty swallowing   Respiratory: no wheezing, chest tightness, or shortness of breath   Cardiovascular: no chest pain or pressure, no palpitations, no diaphoresis   Gastrointestinal: no nausea, vomiting,abdominal pain, diarrhea or constipation, no melena   Genitourinary: no dysuria, frequency, hematuria , or nocturia   Musculoskeletal: no myalgias or arthralgias, no back pain +R ankle pain/generalized weakness-unable to ambulate.   Skin: no rashes or sores   Neurological: no focal weakness, numbness, tingling, or headache, no seizures    PHYSICAL ASSESSMENT:  Constitutional: Alert and oriented to person, place, and time.   Head: Normocephalic and atraumatic.   Eyes: Pupils are equal, round   Neck: Normal range of motion. Neck supple. No tracheal deviation present.   Cardiovascular: Normal rate and regular rhythm, S1, S2, no murmur   Pulmonary/Chest: Effort normal and breath sounds normal. No rales or wheezes.  Abdomen: Soft. No tenderness, not distended, no ascites, no organomegaly   Musculoskeletal: Normal range of motion. +R ankle swelling  Neurological: CN II-XII grossly intact, no focal neurological deficits   Skin: Normal turgor, no bleeding, no bruising     Palliative Performance Scale:  ___60%  Ambulation reduced; Significant disease; Can't do hobbies/housework; intake normal or reduced; occasional assist;

## 2024-08-01 NOTE — PROGRESS NOTES
[facility] Internal Medicine  Aquiles Taylor MD; Franki Hung MD; Valente León MD; MD Summer Harp MD; Aure Morales MD    [facility] Internal Medicine   IN-PATIENT SERVICE   [facility]    HISTORY AND PHYSICAL EXAMINATION            Date:   8/1/2024  Patient name:  Michael Mcghee  Date of admission:  7/30/2024 11:18 AM  MRN:   [id number]  Account:  [id number]  YOB: 1935  PCP:    Bruno Metcalf APRN - CNP  Room:   2099/2099-01  Code Status:    DNR-CCA    Chief Complaint:     Chief Complaint   Patient presents with    Altered Mental Status   Pt medical record and nursing staff    History Obtained From:     Pt medical record and nursing staff    History of Present Illness:     Michael Mcghee is a 88 y.o. Non- / non  male who presents with Altered Mental Status   and is admitted to the hospital for the management of Delirium.    Michael Mcghee is a 88 y.o. Non- / non  male with a history of CAD status post CABG 1990.  Cardiac cath 2024, hypertension, hyperlipidemia, subdural hematoma, and CHF who presents with and is admitted to the hospital for the management of Delirium.     According to patient, he is feeling quite well and is in no pain at this time.  The patient is oriented to self only.  According to the ER provider he has been increasingly disoriented at his extended care facility.  He was recently admitted and intubated for sepsis at another facility.  His imaging was negative for any acute findings.  There is concern for metabolic encephalopathy related to dehydration and NADIR.  His CODE STATUS is a DNR CCA.  Intubation allowed.  CPR is not allowed.  See documentation in the patient's chart.     During assessment the patient is pleasantly confused.  Cranial nerves II through VII appear grossly intact.  He is able to follow commands.  His heart sounds are S1, S2 no murmurs or rubs noted.  His

## 2024-08-01 NOTE — PROGRESS NOTES
Patient educated on importance of lab work. Patient is continuing to refuse labs. Patient refusing VBG at this time. Christi CALHOUN notified.

## 2024-08-01 NOTE — PROGRESS NOTES
Yari called for patient update. Update provided and all questions answered. Yari expresses appreciation for our care.

## 2024-08-01 NOTE — PROGRESS NOTES
PATIENT REFUSES TO WEAR BIPAP     [x] Risks and benefits explained to patient   [x] Patient refuses to wear Bipap stating \"I'm not wearing that thing\"  [x] Patient verbalizes understanding of information presented.    Will try to attempt later.

## 2024-08-01 NOTE — PLAN OF CARE
Problem: Discharge Planning  Goal: Discharge to home or other facility with appropriate resources  Outcome: Progressing     Problem: Skin/Tissue Integrity  Goal: Absence of new skin breakdown  Description: 1.  Monitor for areas of redness and/or skin breakdown  2.  Assess vascular access sites hourly  3.  Every 4-6 hours minimum:  Change oxygen saturation probe site  4.  Every 4-6 hours:  If on nasal continuous positive airway pressure, respiratory therapy assess nares and determine need for appliance change or resting period.  8/1/2024 0316 by Mi William RN  Outcome: Progressing  Note: Skin with multiple echymotic areas.  Buttocks slightly red.  Remains on waffle.  Turned and repositioned frequently.  7/31/2024 1459 by Sonia Jenkins RN  Outcome: Progressing  Note: No new breakdown noted on exam      Problem: Safety - Adult  Goal: Free from fall injury  8/1/2024 0316 by Mi William RN  Outcome: Progressing  Note: Patient confused.  Weak.  Bed alarm on.  Refusing care at times.  7/31/2024 1459 by Sonia Jenkins RN  Outcome: Progressing  Flowsheets (Taken 7/31/2024 1456)  Free From Fall Injury:   Instruct family/caregiver on patient safety   Based on caregiver fall risk screen, instruct family/caregiver to ask for assistance with transferring infant if caregiver noted to have fall risk factors  Note: Patient has been more drowsy today, safety issues in regards to getting up has not been a problem      Problem: ABCDS Injury Assessment  Goal: Absence of physical injury  8/1/2024 0316 by Mi William RN  Outcome: Progressing  7/31/2024 1459 by Sonia Jenkins RN  Outcome: Progressing     Problem: Infection - Adult  Goal: Absence of infection at discharge  8/1/2024 0316 by Mi William RN  Outcome: Progressing  Note: VS stable.  Afebrile.  7/31/2024 1459 by Sonia Jenkins RN  Outcome: Progressing  Flowsheets (Taken 7/31/2024 1456)  Absence of infection at discharge:   Assess  and monitor for signs and symptoms of infection   Administer medications as ordered  Note: Patient's WBC's have been within normal limits  Goal: Absence of infection during hospitalization  Outcome: Progressing

## 2024-08-01 NOTE — CARE COORDINATION
DISCHARGE PLANNING NOTE:    Plan remains for patient to return to [facility]/long term care resident.    Active order for IV Solu-medrol 40mg every 6 hours.    Will continue to follow for additional discharge needs.    Electronically signed by Claudia Zambrano RN on 8/1/2024 at 1:17 PM

## 2024-08-01 NOTE — PROGRESS NOTES
Patient refusing to wear NC at this time. SpO2 92%. Patient educated on importance of wearing NC. Patient continues to refuse.

## 2024-08-01 NOTE — PROGRESS NOTES
Patient refusing writer to do VS or change depends. Balling up fists and trying to hit writer.  Also refusing oxygen nasal cannula to be placed,  Oxygen sat room air 93%.

## 2024-08-01 NOTE — PROGRESS NOTES
Writer responded to consult regarding patient wanting communion and to see a ; writer told patient that a  is not available to see patient today; patient acknowledged receiving communion earlier today and stated \"that is all that matters\"; patient is okay with not seeing a ; amarising given;       08/01/24 1909   Encounter Summary   Encounter Overview/Reason Spiritual/Emotional Needs   Service Provided For Patient   Referral/Consult From Palliative Care   Last Encounter  08/01/24   Complexity of Encounter Low   Spiritual/Emotional needs   Type Spiritual Support   Assessment/Intervention/Outcome   Assessment Calm;Coping   Intervention Discussed belief system/Hinduism practices/leta   Outcome Engaged in conversation;Expressed feelings, needs, and concerns;Expressed Gratitude;Receptive

## 2024-08-01 NOTE — PROGRESS NOTES
Patient refusing blood sugar checks. Patient educated on importance of checking blood sugar. Patient continues to refuse.

## 2024-08-01 NOTE — PROGRESS NOTES
Phlebotomist at bedside to obtain patients CBC and BMP. Patient refusing. RN brought to bedside, and educated patient on importance of daily labs. Patient begins cussing and yelling. Patient continues to refuse labs at this time.

## 2024-08-02 PROBLEM — Z71.89 DNR (DO NOT RESUSCITATE) DISCUSSION: Status: ACTIVE | Noted: 2024-08-02

## 2024-08-02 PROBLEM — R26.2 UNABLE TO AMBULATE: Status: ACTIVE | Noted: 2024-08-02

## 2024-08-02 PROBLEM — M25.571 ACUTE RIGHT ANKLE PAIN: Status: ACTIVE | Noted: 2024-08-02

## 2024-08-02 PROBLEM — Z51.5 PALLIATIVE CARE ENCOUNTER: Status: ACTIVE | Noted: 2024-08-02

## 2024-08-02 PROBLEM — Z71.89 GOALS OF CARE, COUNSELING/DISCUSSION: Status: ACTIVE | Noted: 2024-08-02

## 2024-08-02 PROBLEM — Z71.89 ACP (ADVANCE CARE PLANNING): Status: ACTIVE | Noted: 2024-08-02

## 2024-08-02 LAB
ARTERIAL PATENCY WRIST A: ABNORMAL
BDY SITE: ABNORMAL
COHGB MFR BLD: 1.1 % (ref 0–5)
GLUCOSE BLD-MCNC: 109 MG/DL (ref 75–110)
GLUCOSE BLD-MCNC: 91 MG/DL (ref 75–110)
GLUCOSE BLD-MCNC: 92 MG/DL (ref 75–110)
HCO3 ARTERIAL: 28.9 MMOL/L (ref 22–26)
METHEMOGLOBIN: <0 % (ref 0–1.9)
O2 SAT, ARTERIAL: 89.3 % (ref 95–98)
PCO2 ARTERIAL: 51.7 MMHG (ref 35–45)
PH ARTERIAL: 7.36 (ref 7.35–7.45)
PO2 ARTERIAL: 57.9 MMHG (ref 80–100)
POSITIVE BASE EXCESS, ART: 3.4 MMOL/L (ref 0–2)

## 2024-08-02 PROCEDURE — 99232 SBSQ HOSP IP/OBS MODERATE 35: CPT | Performed by: NURSE PRACTITIONER

## 2024-08-02 PROCEDURE — 36600 WITHDRAWAL OF ARTERIAL BLOOD: CPT

## 2024-08-02 PROCEDURE — 82947 ASSAY GLUCOSE BLOOD QUANT: CPT

## 2024-08-02 PROCEDURE — 82805 BLOOD GASES W/O2 SATURATION: CPT

## 2024-08-02 PROCEDURE — 2580000003 HC RX 258: Performed by: INTERNAL MEDICINE

## 2024-08-02 PROCEDURE — 2060000000 HC ICU INTERMEDIATE R&B

## 2024-08-02 PROCEDURE — 6360000002 HC RX W HCPCS: Performed by: INTERNAL MEDICINE

## 2024-08-02 PROCEDURE — C9254 INJECTION, LACOSAMIDE: HCPCS | Performed by: INTERNAL MEDICINE

## 2024-08-02 PROCEDURE — 99233 SBSQ HOSP IP/OBS HIGH 50: CPT | Performed by: INTERNAL MEDICINE

## 2024-08-02 RX ORDER — LORAZEPAM 2 MG/ML
2 INJECTION INTRAMUSCULAR
Status: DISCONTINUED | OUTPATIENT
Start: 2024-08-02 | End: 2024-08-05 | Stop reason: HOSPADM

## 2024-08-02 RX ORDER — SODIUM CHLORIDE 9 MG/ML
INJECTION, SOLUTION INTRAVENOUS CONTINUOUS
Status: DISCONTINUED | OUTPATIENT
Start: 2024-08-02 | End: 2024-08-04

## 2024-08-02 RX ADMIN — LACOSAMIDE 100 MG: 10 INJECTION INTRAVENOUS at 15:10

## 2024-08-02 RX ADMIN — ENOXAPARIN SODIUM 30 MG: 100 INJECTION SUBCUTANEOUS at 23:26

## 2024-08-02 RX ADMIN — LORAZEPAM 2 MG: 2 INJECTION INTRAMUSCULAR; INTRAVENOUS at 14:13

## 2024-08-02 RX ADMIN — LACOSAMIDE 100 MG: 10 INJECTION INTRAVENOUS at 21:53

## 2024-08-02 RX ADMIN — SODIUM CHLORIDE: 9 INJECTION, SOLUTION INTRAVENOUS at 15:00

## 2024-08-02 NOTE — PROGRESS NOTES
Patient appears to be seizing with eyes rolling to the back of his head. Dr. Deluna on unit and notified that patient did refuse his seizure medication this morning. PRN Ativan ordered and MD will change seizure medications to IV.

## 2024-08-02 NOTE — PROGRESS NOTES
[facility] Internal Medicine  Aquiles Taylor MD; Franki Hung MD; Valente León MD; MD Summer Harp MD; Aure Morales MD    [facility] Internal Medicine   IN-PATIENT SERVICE   [facility]    HISTORY AND PHYSICAL EXAMINATION            Date:   8/2/2024  Patient name:  Michael Mcghee  Date of admission:  7/30/2024 11:18 AM  MRN:   [id number]  Account:  [id number]  YOB: 1935  PCP:    Bruno Metcalf APRN - CNP  Room:   2099/2099-01  Code Status:    DNR-CCA    Chief Complaint:     Chief Complaint   Patient presents with    Altered Mental Status   Pt medical record and nursing staff    History Obtained From:     Pt medical record and nursing staff    History of Present Illness:     Michael Mcghee is a 88 y.o. Non- / non  male who presents with Altered Mental Status   and is admitted to the hospital for the management of Delirium.    Michael Mcghee is a 88 y.o. Non- / non  male with a history of CAD status post CABG 1990.  Cardiac cath 2024, hypertension, hyperlipidemia, subdural hematoma, and CHF who presents with and is admitted to the hospital for the management of Delirium.     According to patient, he is feeling quite well and is in no pain at this time.  The patient is oriented to self only.  According to the ER provider he has been increasingly disoriented at his extended care facility.  He was recently admitted and intubated for sepsis at another facility.  His imaging was negative for any acute findings.  There is concern for metabolic encephalopathy related to dehydration and NADIR.  His CODE STATUS is a DNR CCA.  Intubation allowed.  CPR is not allowed.  See documentation in the patient's chart.     During assessment the patient is pleasantly confused.  Cranial nerves II through VII appear grossly intact.  He is able to follow commands.  His heart sounds are S1, S2 no murmurs or rubs noted.  His  intact  Nose: no drainage noted  Mouth: mucous membranes moist  Neck: supple, no carotid bruits, thyroid not palpable  Lungs: Bilateral equal air entry, clear to ausculation, no wheezing, rales or rhonchi, normal effort  Cardiovascular: normal rate, regular rhythm, no murmur, gallop, rub  Abdomen: Soft, nontender, nondistended, normal bowel sounds, no hepatomegaly or splenomegaly  Neurologic: Moving all 4 extremities  Skin: No gross lesions, rashes, bruising or bleeding on exposed skin area  Extremities: History of bilateral knee replacement noted peripheral pulses palpable, no pedal edema or calf pain with palpation    Investigations:      Laboratory Testing:  Recent Results (from the past 24 hour(s))   POC Glucose Fingerstick    Collection Time: 08/01/24  7:17 PM   Result Value Ref Range    POC Glucose 180 (H) 75 - 110 mg/dL   POC Glucose Fingerstick    Collection Time: 08/02/24  6:12 AM   Result Value Ref Range    POC Glucose 109 75 - 110 mg/dL       Imaging/Diagnostics:  CT CHEST ABDOMEN PELVIS WO CONTRAST Additional Contrast? None    Result Date: 7/30/2024  Small left-sided pleural effusion with associated atelectasis or scarring in the left lower lobe. Nodularity in the superior aspect of the right upper lobe which measures 3.6 mm in transverse. Moderate stool in the rectum with associated mural thickening of the rectum and adjacent inflammatory change related to stercoral colitis.  There are findings related to constipation. Diverticulosis of the colon. Vascular disease.  There is dilatation of the ascending thoracic aorta which measures a maximum 4.1 cm. Additional findings noted above. RECOMMENDATIONS: Right solid pulmonary nodule within the upper lobe measuring 4 mm. Per Fleischner Society Guidelines, if patient is low risk for malignancy, no routine follow-up imaging is recommended. If patient is high risk for malignancy, a non-contrast Chest CT at 12 months is optional. If performed and the nodule is

## 2024-08-02 NOTE — PROGRESS NOTES
08/02/24 1517   Encounter Summary   Encounter Overview/Reason  Encounter   Service Provided For Patient   Last Encounter  08/02/24   Rituals, Rites and Sacraments   Type Sacrament of Sick;Anointing;Blessings

## 2024-08-02 NOTE — PROGRESS NOTES
Dr. Deluna would like to hold off on discharge at this point as family will be in tomorrow and patient appears to be declining. Social work notified.

## 2024-08-02 NOTE — CARE COORDINATION
Transportation arranged for patient to go to [facility] at [facility] via [employer] . Facility informed. Bedside nurse informed.     Number for Report: [phone]

## 2024-08-02 NOTE — PROGRESS NOTES
08/02/24 1516   Encounter Summary   Encounter Overview/Reason Volunteer Encounter   Service Provided For Patient   Last Encounter  08/02/24   Assessment/Intervention/Outcome   Intervention Prayer (assurance of)/[place]

## 2024-08-02 NOTE — PROGRESS NOTES
Writer informed Dr. Arriaga of ABG results and pt condition. Wrote back saying he or his NP will see pt later today.

## 2024-08-02 NOTE — PLAN OF CARE
Problem: Skin/Tissue Integrity  Goal: Absence of new skin breakdown  Description: 1.  Monitor for areas of redness and/or skin breakdown  2.  Assess vascular access sites hourly  3.  Every 4-6 hours minimum:  Change oxygen saturation probe site  4.  Every 4-6 hours:  If on nasal continuous positive airway pressure, respiratory therapy assess nares and determine need for appliance change or resting period.  8/2/2024 1559 by Leo Elizabeth RN  Outcome: Progressing     Problem: ABCDS Injury Assessment  Goal: Absence of physical injury  Outcome: Progressing  Flowsheets (Taken 8/2/2024 0800)  Absence of Physical Injury: Implement safety measures based on patient assessment     Problem: Chronic Conditions and Co-morbidities  Goal: Patient's chronic conditions and co-morbidity symptoms are monitored and maintained or improved  Outcome: Progressing  Flowsheets  Taken 8/2/2024 1200  Care Plan - Patient's Chronic Conditions and Co-Morbidity Symptoms are Monitored and Maintained or Improved:   Monitor and assess patient's chronic conditions and comorbid symptoms for stability, deterioration, or improvement   Collaborate with multidisciplinary team to address chronic and comorbid conditions and prevent exacerbation or deterioration  Taken 8/2/2024 0800  Care Plan - Patient's Chronic Conditions and Co-Morbidity Symptoms are Monitored and Maintained or Improved: Monitor and assess patient's chronic conditions and comorbid symptoms for stability, deterioration, or improvement

## 2024-08-02 NOTE — PLAN OF CARE
Problem: Skin/Tissue Integrity  Goal: Absence of new skin breakdown  Description: 1.  Monitor for areas of redness and/or skin breakdown  2.  Assess vascular access sites hourly  3.  Every 4-6 hours minimum:  Change oxygen saturation probe site  4.  Every 4-6 hours:  If on nasal continuous positive airway pressure, respiratory therapy assess nares and determine need for appliance change or resting period.  8/2/2024 0710 by Roxann Davis RN  Outcome: Progressing     Problem: Discharge Planning  Goal: Discharge to home or other facility with appropriate resources  8/2/2024 1126 by Muriel Castillo RN  Outcome: Completed  Flowsheets (Taken 8/2/2024 0800 by Leo Elizabeth, RN)  Discharge to home or other facility with appropriate resources:   Identify barriers to discharge with patient and caregiver   Arrange for needed discharge resources and transportation as appropriate  8/2/2024 0710 by Roxann Davis RN  Outcome: Progressing     Problem: Safety - Adult  Goal: Free from fall injury  8/2/2024 1126 by Muriel Castillo RN  Outcome: Completed  Flowsheets (Taken 8/2/2024 0800 by Leo Elizabeth, RN)  Free From Fall Injury: Instruct family/caregiver on patient safety  8/2/2024 0710 by Roxann Davis RN  Outcome: Progressing     Problem: Infection - Adult  Goal: Absence of infection at discharge  Outcome: Completed  Flowsheets (Taken 8/2/2024 0800 by Leo Elizabeth, RN)  Absence of infection at discharge: Assess and monitor for signs and symptoms of infection  Goal: Absence of infection during hospitalization  Outcome: Completed  Flowsheets (Taken 8/2/2024 0800 by Leo Elizabeth, RN)  Absence of infection during hospitalization:   Assess and monitor for signs and symptoms of infection   Monitor lab/diagnostic results

## 2024-08-02 NOTE — PROGRESS NOTES
Occupational Therapy  [facility]   OCCUPATIONAL THERAPY MISSED TREATMENT NOTE   INPATIENT   Date: 24  Patient Name: Michael Mcghee       Room:   MRN: [id number]   Account #: [id number]    : 1935  (88 y.o.)  Gender: male                 REASON FOR MISSED TREATMENT:    Pt refusing therapy services at this time with difficulties maintaining arousal. Writer provides Max verbal and tactile cues for maintenance of rousal. Education on benefit of engagement in session provided with patient continuing to refuse. OT will re-attempt as appropriate/time allowing. 0831      Electronically signed by Yuko Yeager on 24 at 10:33 AM EDT

## 2024-08-02 NOTE — PROGRESS NOTES
[facility] PULMONARY,CRITICAL CARE & SLEEP   Shon Arriaga MD/Jonathon ARGUELLES AGACNP-BC, NP-C      Karin ARGUELLES NP-C     Hakan ARGUELLES NP-C                                          Pulmonary Progress Note    Patient - Michael Mcghee   Age - 88 y.o.   - 1935  MRN - [id number]  Acct # - [id number]  Date of Admission - 2024 11:18 AM    Consulting Service/Physician:       Primary Care Physician: Bruno Metcalf, APRN - CNP    SUBJECTIVE:     Chief Complaint:   Chief Complaint   Patient presents with    Altered Mental Status     Subjective:    Michael is seen resting in bed.  He apparently had more confusion and agitation overnight and this morning.  Repeat blood gas showed improvement in hypercapnia with pH 7.355, CO2 51.7, O2 58 and bicarb 29.  He apparently is refusing to wear a BiPAP.  He has been afebrile.  He is on room air with pulse ox 90%.    VITALS  /67   Pulse 60   Temp 98 °F (36.7 °C) (Axillary)   Resp 16   Ht 1.778 m (5' 10\")   Wt 104 kg (229 lb 4.5 oz)   SpO2 90%   BMI 32.90 kg/m²   Wt Readings from Last 3 Encounters:   24 104 kg (229 lb 4.5 oz)   07/10/24 90.7 kg (200 lb)   24 100.2 kg (221 lb)     I/O (24 Hours)    Intake/Output Summary (Last 24 hours) at 2024 1339  Last data filed at 2024 1801  Gross per 24 hour   Intake 240 ml   Output --   Net 240 ml     Ventilator:   Settings  FiO2 : 35 %  Insp Rise Time (%): 1 %  Exam:   Physical Exam   Constitutional: Elderly man resting in bed on room air no distress  HENT: Unremarkable  Head: Normocephalic and atraumatic.   Eyes: Eyes closed  Neck: Neck supple.   Cardiovascular:  Regular rate and rhythm.  Normal heart tones.  No JVD.    Pulmonary/Chest: Respirations even and nonlabored, clear but diminished anteriorly, no wheezing, on room air with pulse ox 90%   Abdominal: Soft. Bowel sounds are normal.    Musculoskeletal: Normal range of motion.   Neurological:

## 2024-08-02 NOTE — PROGRESS NOTES
I received the patient's HCPOA/ living will documents from his  Shaun Malloy [phone], as he emailed to me. The patient's legal medical decision maker is Marianne Abdi.  I reach Marianne at [phone], and I introduce myself and my role to Marianne. I ask her if she has seen the patient and she states\" yes about a year ago.\"   I update her that she is appointed as the patient's legal HCPOA or medical decision maker. In update Marianne that Michael told me his 's name and I contacted him and he sent me the HCPOA and living will.   I ask how she is related to Michael and she states\" I am that daughter of his wife Cathy's sister Ifeoma.\" We talk about the document and when it was put in place as it was May 16, 2008, and as well that Michael is here as a patient. I review the reason for his admission to the hospital.   We talk about his chronic conditions as well as that he does have a living will, and what that all states. I update Marianne that he remains living at [facility] and I offer contact information.     I ask if she remains agreeable to make his medical decisions, and after explanation of the role and how palliative care is involved, she is agreeable to remain in the role of HCPOA. Marianne states that I can call Yari his niece and update her about the documents and that she is agreeable to the role of being his voice and making decisions when he is unable.     We talk about the support from Palliative care and as well I explain the contents of his living will, and what his wishes would so she can honor them. We talk at length and I answer many questions.     I offer her our contact number, and I encourage her to see Michael and she states that she and her  Ari will be there tomorrow. I as well encourage her to talk to the physician team and understand what his medical conditions are and what the plan of care is.   I update that I will call nilior Greenberg and update her about the

## 2024-08-02 NOTE — ACP (ADVANCE CARE PLANNING)
..Advance Care Planning     Palliative Team Advance Care Planning (ACP) Conversation    Date of Conversation: 24    Individuals present for the conversation: Legal healthcare agent named below. I called Marianne Broderick at [phone]     ACP documents on file prior to discussion:  -Power of  for Healthcare  -Living Will    Previously completed document/s not on file: Not discussed.    Healthcare Decision Maker:    Primary Decision Maker: Marianne Abdi - Other - [phone]     Conversation Summary:  The patient's  Shaun Hilliardoman [phone] emailed me the patient's HCPOA and living will. He completed the documents in May of 2008. His wife who is not  was appointed as primary and the alternate whom is now the primary decision maker is Marianne Broderick who is the daughter of his late wife Cathy's sister Ifeoma. After a lengthy conversation about what the role would be, and what is in his living will, she agreed to execute her rights as she is appointed as alternate and the patient's primary decision maker on his HCPOA.        Resuscitation Status:   Code Status: DNR-CCA     Documentation Completed: NONE        Yadira Lopez RN

## 2024-08-02 NOTE — PROGRESS NOTES
RN went into give morning medications but patient refusing and screaming. Education provided but patient still refusing.

## 2024-08-02 NOTE — PROGRESS NOTES
NIV removed from pt room due to pt adamantly refusing to wear regardless of the education provided.

## 2024-08-02 NOTE — PROGRESS NOTES
Physical Therapy        Physical Therapy Cancel Note      DATE: 2024    NAME: Michael Mcghee  MRN: [id number]   : 1935      Patient not seen this date for Physical Therapy due to:    2024 At 8:31-8:33 am. Pt refused participation in PT evaluation. Pt refusing bed mobility, dangling, or transfer to the chair. Case discussed with nurse Llamas.     The above documentation completed by Student Physical Therapist was provided under the direct line of sight by supervision. Documentation was reviewed and accepted by supervising Clinical Instructor Kori Barry PT.     SANGEETHA Abernathy    The above documentation completed by Student Physical Therapist Irene Quigley  was reviewed and accepted by the supervising Clinical instructor Kori Barry PT.    Electronically signed by Kori Barry PT on 2024 at 1:00 PM

## 2024-08-02 NOTE — PROGRESS NOTES
..    Palliative Care Progress Note    NAME:  Michael Mcghee  MEDICAL RECORD NUMBER:  [id number]  AGE: 88 y.o.   GENDER: male  : 1935  TODAY'S DATE:  2024    Reason for Consult:  goals of care      Plan        Palliative Interaction: Dr. Deluna updated writer that patient is declining and need decision maker for further decisions.     I went to see patient and he was being cleaned up by staff. RN assisting reports patient to be more confused today and needing more help. No family was present.     I reviewed Yadira RN notes and she was able to call  that other legal document was signed by and found HCPOA. She printed and placed on chart. She also contacted WANDA Lopes per her notes and she is agreeable to make decisions and is coming to see patient tomorrow. DC is being held d/t the above with attending planning to talk with family tomorrow.     I reached out to Marianne and informed her of my role and let her know Yadira has left for the day and I am helping. I updated her on patients condition changes and that DC is being held.     I discussed goals and she reports being overwhelmed learning all of this today. She is planning to come in tomorrow to see him and is willing to make decisions then.     I discussed present code status and option to focus on comfort and call in hospice care. I also informed her that Yadira left legal documents for her and I will add code classification sheet for her review.     I offered her support and updated Dr. Deluna of plan. When I went back to room, patient was lying in bed with eyes closed. He did not appear to be in distress. I did not awake him d/t the restlessness reported earlier.     Education/support to staff  Education/support to family  Discharge planning/helping to coordinate care  Communications with primary service  Providing support for coping/adaptation/distress of patient  Discussing meaning/purpose   Decision making regarding life prolonging treatment  Continue  [] Distended      [] Ascites      [] Other:    Neurological: [] Normal Speech      [] Normal Sensation      []  Deficits present:  nonverbal    Extremity:  [x] normal skin color/temp      [] clubbing/cyanosis      []  No edema      [] Other:     Palliative Performance Scale:  ___60%  Ambulation reduced; Significant disease; Can't do hobbies/housework; intake normal or reduced; occasional assist; LOC full/confusion  ___50%  Mainly sit/lie; Extensive disease; Can't do any work; Considerable assist; intake normal or reduced; LOC full/confusion  ___40%  Mainly in bed; Extensive disease; Mainly assist; intake normal or reduced; LOC full/confusion   ___30%  Bed Bound; Extensive disease; Total care; intake reduced; LOCfull/confusion  _x__20%  Bed Bound; Extensive disease; Total care; intake minimal; Drowsy/coma  ___10%  Bed Bound; Extensive disease; Total care; Mouth care only; Drowsy/coma  ___0       Death        Please call with any palliative questions or concerns.  Palliative Care Team is available via perfect serve or via phone.       Palliative Care will continue to follow Mr. Mcghee's care as needed.    The note has been dictated by dragon, typing errors may be a possibility     Thank you for allowing Palliative Care to participate in the care of Mr. Mcghee .    The total time I spent in seeing the patient, discussing goals of care, advanced directives, code status and other major issues was more than 35 minutes     Electronically signed by   KINDRA CABRERA CNP  Palliative Care Team  on 8/2/2024 at 2:16 PM    Palliative care office: [phone]

## 2024-08-03 LAB
ANION GAP SERPL CALCULATED.3IONS-SCNC: 11 MMOL/L (ref 9–17)
BASOPHILS # BLD: 0 K/UL (ref 0–0.2)
BASOPHILS NFR BLD: 1 % (ref 0–2)
BUN SERPL-MCNC: 25 MG/DL (ref 8–23)
CALCIUM SERPL-MCNC: 8.6 MG/DL (ref 8.6–10.4)
CHLORIDE SERPL-SCNC: 106 MMOL/L (ref 98–107)
CO2 SERPL-SCNC: 26 MMOL/L (ref 20–31)
CREAT SERPL-MCNC: 1.2 MG/DL (ref 0.7–1.2)
EOSINOPHIL # BLD: 0.3 K/UL (ref 0–0.4)
EOSINOPHILS RELATIVE PERCENT: 5 % (ref 0–4)
ERYTHROCYTE [DISTWIDTH] IN BLOOD BY AUTOMATED COUNT: 14.9 % (ref 11.5–14.9)
GFR, ESTIMATED: 58 ML/MIN/1.73M2
GLUCOSE BLD-MCNC: 71 MG/DL (ref 75–110)
GLUCOSE BLD-MCNC: 74 MG/DL (ref 75–110)
GLUCOSE BLD-MCNC: 82 MG/DL (ref 75–110)
GLUCOSE SERPL-MCNC: 86 MG/DL (ref 70–99)
HCT VFR BLD AUTO: 41.2 % (ref 41–53)
HGB BLD-MCNC: 13.1 G/DL (ref 13.5–17.5)
LYMPHOCYTES NFR BLD: 2.2 K/UL (ref 1–4.8)
LYMPHOCYTES RELATIVE PERCENT: 31 % (ref 24–44)
MCH RBC QN AUTO: 29.5 PG (ref 26–34)
MCHC RBC AUTO-ENTMCNC: 31.9 G/DL (ref 31–37)
MCV RBC AUTO: 92.5 FL (ref 80–100)
MONOCYTES NFR BLD: 0.5 K/UL (ref 0.1–1.3)
MONOCYTES NFR BLD: 7 % (ref 1–7)
NEUTROPHILS NFR BLD: 56 % (ref 36–66)
NEUTS SEG NFR BLD: 4.1 K/UL (ref 1.3–9.1)
PLATELET # BLD AUTO: 150 K/UL (ref 150–450)
PMV BLD AUTO: 9.5 FL (ref 6–12)
POTASSIUM SERPL-SCNC: 3.6 MMOL/L (ref 3.7–5.3)
RBC # BLD AUTO: 4.45 M/UL (ref 4.5–5.9)
SODIUM SERPL-SCNC: 143 MMOL/L (ref 135–144)
WBC OTHER # BLD: 7.3 K/UL (ref 3.5–11)

## 2024-08-03 PROCEDURE — 36415 COLL VENOUS BLD VENIPUNCTURE: CPT

## 2024-08-03 PROCEDURE — 2580000003 HC RX 258: Performed by: INTERNAL MEDICINE

## 2024-08-03 PROCEDURE — 6360000002 HC RX W HCPCS: Performed by: NURSE PRACTITIONER

## 2024-08-03 PROCEDURE — 6360000002 HC RX W HCPCS: Performed by: INTERNAL MEDICINE

## 2024-08-03 PROCEDURE — 82947 ASSAY GLUCOSE BLOOD QUANT: CPT

## 2024-08-03 PROCEDURE — C9254 INJECTION, LACOSAMIDE: HCPCS | Performed by: INTERNAL MEDICINE

## 2024-08-03 PROCEDURE — 85025 COMPLETE CBC W/AUTO DIFF WBC: CPT

## 2024-08-03 PROCEDURE — 6370000000 HC RX 637 (ALT 250 FOR IP): Performed by: INTERNAL MEDICINE

## 2024-08-03 PROCEDURE — 6370000000 HC RX 637 (ALT 250 FOR IP)

## 2024-08-03 PROCEDURE — 2060000000 HC ICU INTERMEDIATE R&B

## 2024-08-03 PROCEDURE — 99233 SBSQ HOSP IP/OBS HIGH 50: CPT | Performed by: INTERNAL MEDICINE

## 2024-08-03 PROCEDURE — 80048 BASIC METABOLIC PNL TOTAL CA: CPT

## 2024-08-03 PROCEDURE — 6370000000 HC RX 637 (ALT 250 FOR IP): Performed by: STUDENT IN AN ORGANIZED HEALTH CARE EDUCATION/TRAINING PROGRAM

## 2024-08-03 RX ORDER — HYDRALAZINE HYDROCHLORIDE 20 MG/ML
10 INJECTION INTRAMUSCULAR; INTRAVENOUS ONCE
Status: COMPLETED | OUTPATIENT
Start: 2024-08-03 | End: 2024-08-03

## 2024-08-03 RX ADMIN — HYDRALAZINE HYDROCHLORIDE 10 MG: 20 INJECTION INTRAMUSCULAR; INTRAVENOUS at 00:26

## 2024-08-03 RX ADMIN — SACUBITRIL AND VALSARTAN 1 TABLET: 24; 26 TABLET, FILM COATED ORAL at 21:39

## 2024-08-03 RX ADMIN — POLYETHYLENE GLYCOL 3350 17 G: 17 POWDER, FOR SOLUTION ORAL at 21:48

## 2024-08-03 RX ADMIN — LACOSAMIDE 100 MG: 100 TABLET, FILM COATED ORAL at 21:40

## 2024-08-03 RX ADMIN — LORAZEPAM 2 MG: 2 INJECTION INTRAMUSCULAR; INTRAVENOUS at 10:47

## 2024-08-03 RX ADMIN — SODIUM CHLORIDE: 9 INJECTION, SOLUTION INTRAVENOUS at 02:57

## 2024-08-03 RX ADMIN — SODIUM CHLORIDE: 9 INJECTION, SOLUTION INTRAVENOUS at 18:33

## 2024-08-03 RX ADMIN — MIRTAZAPINE 7.5 MG: 15 TABLET, FILM COATED ORAL at 21:40

## 2024-08-03 RX ADMIN — VENLAFAXINE 37.5 MG: 37.5 TABLET ORAL at 21:49

## 2024-08-03 RX ADMIN — ENOXAPARIN SODIUM 30 MG: 100 INJECTION SUBCUTANEOUS at 12:04

## 2024-08-03 RX ADMIN — ATORVASTATIN CALCIUM 20 MG: 20 TABLET, FILM COATED ORAL at 21:39

## 2024-08-03 RX ADMIN — CALCIUM CARBONATE 600 MG (1,500 MG)-VITAMIN D3 400 UNIT TABLET 1 TABLET: at 21:40

## 2024-08-03 RX ADMIN — ACETAMINOPHEN 650 MG: 325 TABLET ORAL at 21:40

## 2024-08-03 RX ADMIN — LACOSAMIDE 100 MG: 10 INJECTION INTRAVENOUS at 10:45

## 2024-08-03 RX ADMIN — Medication: at 21:41

## 2024-08-03 RX ADMIN — OXYBUTYNIN CHLORIDE 5 MG: 5 TABLET, EXTENDED RELEASE ORAL at 21:40

## 2024-08-03 RX ADMIN — Medication: at 10:00

## 2024-08-03 RX ADMIN — TAMSULOSIN HYDROCHLORIDE 0.4 MG: 0.4 CAPSULE ORAL at 21:39

## 2024-08-03 ASSESSMENT — PAIN SCALES - GENERAL
PAINLEVEL_OUTOF10: 4
PAINLEVEL_OUTOF10: 4

## 2024-08-03 ASSESSMENT — PAIN - FUNCTIONAL ASSESSMENT: PAIN_FUNCTIONAL_ASSESSMENT: PREVENTS OR INTERFERES SOME ACTIVE ACTIVITIES AND ADLS

## 2024-08-03 ASSESSMENT — PAIN SCALES - WONG BAKER: WONGBAKER_NUMERICALRESPONSE: NO HURT

## 2024-08-03 ASSESSMENT — PAIN DESCRIPTION - ORIENTATION: ORIENTATION: RIGHT;LEFT

## 2024-08-03 ASSESSMENT — PAIN DESCRIPTION - LOCATION: LOCATION: KNEE;FINGER (COMMENT WHICH ONE)

## 2024-08-03 ASSESSMENT — PAIN DESCRIPTION - DESCRIPTORS
DESCRIPTORS: ACHING
DESCRIPTORS: ACHING

## 2024-08-03 NOTE — PLAN OF CARE
Problem: Discharge Planning  Goal: Discharge to home or other facility with appropriate resources  Outcome: Progressing     Problem: Skin/Tissue Integrity  Goal: Absence of new skin breakdown  Description: 1.  Monitor for areas of redness and/or skin breakdown  2.  Assess vascular access sites hourly  3.  Every 4-6 hours minimum:  Change oxygen saturation probe site  4.  Every 4-6 hours:  If on nasal continuous positive airway pressure, respiratory therapy assess nares and determine need for appliance change or resting period.  Outcome: Progressing     Problem: Safety - Adult  Goal: Free from fall injury  Outcome: Progressing     Problem: ABCDS Injury Assessment  Goal: Absence of physical injury  Outcome: Progressing     Problem: Neurosensory - Adult  Goal: Absence of seizures  Outcome: Progressing  Flowsheets (Taken 8/3/2024 1000)  Absence of seizures: Monitor for seizure activity.  If seizure occurs, document type and location of movements and any associated apnea

## 2024-08-03 NOTE — PROGRESS NOTES
Physical Therapy        Physical Therapy Cancel Note      DATE: 8/3/2024    NAME: Michael Mcghee  MRN: [id number]   : 1935      Patient not seen this date for Physical Therapy due to:    8-3-2024 at 1057- Pt unavailable for PT evaluation.  Bed is elevated and PCT just starting bed bath.  Will attempt later if time permits.      Electronically signed by Kori Barry PT on 8/3/2024 at 11:00 AM

## 2024-08-03 NOTE — PLAN OF CARE
Problem: Discharge Planning  Goal: Discharge to home or other facility with appropriate resources  Recent Flowsheet Documentation  Taken 8/3/2024 0227 by Jaelyn Moore RN  Discharge to home or other facility with appropriate resources: Identify barriers to discharge with patient and caregiver     Problem: Safety - Adult  Goal: Free from fall injury  Outcome: Progressing  Note: Pt remains free from falls and accidental injury at this time. Fall precautions in place, floor free from obstacles, and bed is locked and in lowest position. Adequate lighting provided.  Pt encouraged to call for any need.  Bed alarm activated. Will continue to monitor needs during hourly rounding, and reinforce education on use of call light.     Problem: Pain  Goal: Verbalizes/displays adequate comfort level or baseline comfort level  8/3/2024 0227 by Jaelyn Moore RN  Outcome: Progressing  Note: No pain at this time.  0/10 pain scale.     Problem: Cardiovascular - Adult  Goal: Maintains optimal cardiac output and hemodynamic stability  8/3/2024 0227 by Jaelyn Moore RN  Outcome: Progressing  Flowsheets (Taken 8/3/2024 0227)  Maintains optimal cardiac output and hemodynamic stability:   Monitor blood pressure and heart rate   Administer vasoactive medications as ordered     Problem: Respiratory - Adult  Goal: Achieves optimal ventilation and oxygenation  8/3/2024 0227 by Jaelyn Moore RN  Outcome: Progressing  Flowsheets (Taken 8/3/2024 0227)  Achieves optimal ventilation and oxygenation:   Assess for changes in respiratory status   Assess for changes in mentation and behavior   Respiratory therapy support as indicated     Problem: Neurosensory - Adult  Goal: Absence of seizures  8/3/2024 0227 by Jaelyn Moore RN  Outcome: Progressing  Flowsheets (Taken 8/3/2024 0227)  Absence of seizures:   Monitor for seizure activity.  If seizure occurs, document type and location of movements and any associated apnea

## 2024-08-03 NOTE — PROGRESS NOTES
RN reached out to Yari per WANDA Lopes's request and left VM with Marianne's number. RN reached out to Marianne to let her know VM was left. Also left unit number for return phone call.    Attending Attestation (For Attendings USE Only)...

## 2024-08-03 NOTE — PROGRESS NOTES
the Progressive Unit/Step down  88-year-old elderly gentleman who was recently discharged 3 weeks ago from [facility] after E. coli infection treated with IV antibiotics patient history of cardiac cath hypertension hyperlipidemia history of subdural hematoma on antiseizure medication admitted for altered mental status delirium more drowsy and sleepy no fever chills no nausea vomiting  History mostly noted from the nursing staff medical records and history was provided by family member  Clinically dehydrated with acute kidney injury creatinine 1.7  IV fluid resuscitation  Hypoglycemia stop diabetic medication including SGLT2 inhibitors  Check ammonia levels and ABG  Avoid any sedatives  Neurology consulted plan for EEG  Keppra levels  CT chest Abdo pelvis noted nil acute for the symptoms  CT head was done nil acute  IV fluids supportive care OT PT  8/2   Patient, has advanced dementia  Neurology signed off  Patient refused EEG  Refusing BiPAP  Ordering ABG likely CO2 retention may be the reason for decompensation  DC fluids with CHF  Overall prognosis very guarded  Patient is DNR CCA  Palliative care following, I was informed, family is leaning toward hospice  Prognosis very guarded    8/3   Resumed oral medications  POA will discuss with Perative care on Monday and decide further course of action, I was informed they are leaning toward hospice.  Overall prognosis guarded, on gentle hydration  Consultations:   IP CONSULT TO SPIRITUAL SERVICES  IP CONSULT TO NEUROLOGY  IP CONSULT TO PALLIATIVE CARE  IP CONSULT TO PULMONOLOGY     Patient is admitted as inpatient status because of co-morbidities listed above, severity of signs and symptoms as outlined, requirement for current medical therapies and most importantly because of direct risk to patient if care not provided in a hospital setting.  Expected length of stay > 48 hours.    Ryan Deluna MD  8/3/2024  4:41 PM    Copy sent to Bruno Rene, KINDRA -

## 2024-08-03 NOTE — PROGRESS NOTES
[facility] PULMONARY,CRITICAL CARE & SLEEP   Shonjack Arriaga MD/Jonathon ARGUELLES AGACHRISTINAP-BC, NP-C      Karin ARGUELLES NP-C    Hakan ARGUELLES NP-C                                         Pulmonary Progress Note    Patient - Michael Mcghee   Age - 88 y.o.   - 1935  MRN - [id number]  Acct # - [id number]  Date of Admission - 2024 11:18 AM    Consulting Service/Physician:       Primary Care Physician: Bruno Metcalf, APRN - CNP    SUBJECTIVE:     Chief Complaint:   Chief Complaint   Patient presents with    Altered Mental Status     Subjective:    He was alert for me this morning, telling me his wrist hurts. He said he is terrible. He denies any cough or chest pain. He has not had any fevers.     VITALS  BP (!) 154/77   Pulse 79   Temp 98 °F (36.7 °C) (Axillary)   Resp 18   Ht 1.778 m (5' 10\")   Wt 101.6 kg (223 lb 15.8 oz)   SpO2 97%   BMI 32.14 kg/m²   Wt Readings from Last 3 Encounters:   24 101.6 kg (223 lb 15.8 oz)   07/10/24 90.7 kg (200 lb)   24 100.2 kg (221 lb)     I/O (24 Hours)    Intake/Output Summary (Last 24 hours) at 8/3/2024 1827  Last data filed at 8/3/2024 0257  Gross per 24 hour   Intake 928.84 ml   Output --   Net 928.84 ml     Ventilator:   Settings  FiO2 : 35 %  Insp Rise Time (%): 1 %  Exam:   Physical Exam   Constitutional:  awake, alert, somewhat oriented, agitated re: wrist pain  HENT: Unremarkable  Head: Normocephalic and atraumatic.   Eyes: EOM are normal. Pupils are equal, round, and reactive to light.   Neck: Neck supple.   Cardiovascular:  Regular rate and rhythm.  Normal heart tones.  No JVD.    Pulmonary/Chest: Lung sounds somewhat clear, no rhonchi or wheezing, respirations easy at rest on room air  Abdominal: Soft. Bowel sounds are normal. There is no tenderness.   Musculoskeletal Debilitated, limited ROM to RUE due to wrist pain, hematoma noted on top of hand, tender, no warmth  Neurological:patient is  08/01/24 0846    calcium carb-cholecalciferol 600-10 MG-MCG per tab 1 tablet, 1 tablet, Oral, Nightly, Tresa Baez, APRN - NP, 1 tablet at 08/01/24 2028    sacubitril-valsartan (ENTRESTO) 24-26 MG per tablet 1 tablet, 1 tablet, Oral, BID, AdrianhoTresa domingo L, APRN - NP, 1 tablet at 08/01/24 2028    spironolactone (ALDACTONE) tablet 12.5 mg, 12.5 mg, Oral, Daily, Adrianhoffer, Tresa L, APRN - NP, 12.5 mg at 08/01/24 0845    metoprolol succinate (TOPROL XL) extended release tablet 50 mg, 50 mg, Oral, Daily, Adrianhoffer, Tresa L, APRN - NP, 50 mg at 08/01/24 0846    venlafaxine (EFFEXOR) tablet 37.5 mg, 37.5 mg, Oral, BID, Tresa Baez L, APRN - NP, 37.5 mg at 08/01/24 2028    Lab Results:     Lab Results   Component Value Date    WBC 7.3 08/03/2024    HGB 13.1 (L) 08/03/2024    HCT 41.2 08/03/2024    MCV 92.5 08/03/2024     08/03/2024     Lab Results   Component Value Date    CALCIUM 8.6 08/03/2024     08/03/2024    K 3.6 (L) 08/03/2024    CO2 26 08/03/2024     08/03/2024    BUN 25 (H) 08/03/2024    CREATININE 1.2 08/03/2024       Lab Results   Component Value Date    INR 1.1 07/31/2024    PROTIME 14.8 (H) 07/31/2024       Radiology:   No new chest imaging    ASSESSMENT:       Altered mental status, concern for metabolic encephalopathy, recent sepsis admission, improved  Acute hypoxic respiratory failure, resolved, currently on room air  Acute on chronic hypercapnic respiratory failure, ABG 7.31, pCO2 63, O2 85, bicarb 31  Recent urosepsis/CAP, treated with IV cefepime and vancomycin at \[facility]\""  Recent NSTEMI, echo with EF 45 to 50%, grade 2 diastolic dysfunction  Acute on CKD-CR 1.2  CHF, echo with grade 2 diastolic dysfunction  CAD, history of CABG 1990  History of subdural hematoma  Possible seizures, on Vimpat  Remote smoking history, quit 1991  Denies COPD/asthma history  Probable JEFF, no PSG  DNR CCA, with intubation  PLAN:   Stable on room air  C/o wrist pain, defer to

## 2024-08-04 ENCOUNTER — APPOINTMENT (OUTPATIENT)
Dept: GENERAL RADIOLOGY | Age: 89
DRG: 682 | End: 2024-08-04
Payer: MEDICARE

## 2024-08-04 LAB
GLUCOSE BLD-MCNC: 101 MG/DL (ref 75–110)
GLUCOSE BLD-MCNC: 129 MG/DL (ref 75–110)

## 2024-08-04 PROCEDURE — 82947 ASSAY GLUCOSE BLOOD QUANT: CPT

## 2024-08-04 PROCEDURE — 6360000002 HC RX W HCPCS: Performed by: INTERNAL MEDICINE

## 2024-08-04 PROCEDURE — 6370000000 HC RX 637 (ALT 250 FOR IP): Performed by: STUDENT IN AN ORGANIZED HEALTH CARE EDUCATION/TRAINING PROGRAM

## 2024-08-04 PROCEDURE — 2580000003 HC RX 258: Performed by: INTERNAL MEDICINE

## 2024-08-04 PROCEDURE — 2060000000 HC ICU INTERMEDIATE R&B

## 2024-08-04 PROCEDURE — 99232 SBSQ HOSP IP/OBS MODERATE 35: CPT | Performed by: INTERNAL MEDICINE

## 2024-08-04 PROCEDURE — 73120 X-RAY EXAM OF HAND: CPT

## 2024-08-04 PROCEDURE — 6370000000 HC RX 637 (ALT 250 FOR IP)

## 2024-08-04 RX ADMIN — TAMSULOSIN HYDROCHLORIDE 0.4 MG: 0.4 CAPSULE ORAL at 22:30

## 2024-08-04 RX ADMIN — FUROSEMIDE 20 MG: 20 TABLET ORAL at 08:34

## 2024-08-04 RX ADMIN — VENLAFAXINE 37.5 MG: 37.5 TABLET ORAL at 22:32

## 2024-08-04 RX ADMIN — LORAZEPAM 2 MG: 2 INJECTION INTRAMUSCULAR; INTRAVENOUS at 14:59

## 2024-08-04 RX ADMIN — ATORVASTATIN CALCIUM 20 MG: 20 TABLET, FILM COATED ORAL at 22:29

## 2024-08-04 RX ADMIN — SODIUM CHLORIDE, PRESERVATIVE FREE 10 ML: 5 INJECTION INTRAVENOUS at 22:31

## 2024-08-04 RX ADMIN — POLYETHYLENE GLYCOL 3350 17 G: 17 POWDER, FOR SOLUTION ORAL at 22:31

## 2024-08-04 RX ADMIN — CALCIUM CARBONATE 600 MG (1,500 MG)-VITAMIN D3 400 UNIT TABLET 1 TABLET: at 22:29

## 2024-08-04 RX ADMIN — SACUBITRIL AND VALSARTAN 1 TABLET: 24; 26 TABLET, FILM COATED ORAL at 08:35

## 2024-08-04 RX ADMIN — ASPIRIN 81 MG: 81 TABLET, COATED ORAL at 08:35

## 2024-08-04 RX ADMIN — SODIUM CHLORIDE, PRESERVATIVE FREE 10 ML: 5 INJECTION INTRAVENOUS at 08:35

## 2024-08-04 RX ADMIN — SPIRONOLACTONE 12.5 MG: 25 TABLET ORAL at 08:34

## 2024-08-04 RX ADMIN — ENOXAPARIN SODIUM 30 MG: 100 INJECTION SUBCUTANEOUS at 08:35

## 2024-08-04 RX ADMIN — Medication 1 TABLET: at 08:34

## 2024-08-04 RX ADMIN — OXYBUTYNIN CHLORIDE 5 MG: 5 TABLET, EXTENDED RELEASE ORAL at 22:29

## 2024-08-04 RX ADMIN — OXYBUTYNIN CHLORIDE 5 MG: 5 TABLET, EXTENDED RELEASE ORAL at 08:34

## 2024-08-04 RX ADMIN — LACOSAMIDE 100 MG: 100 TABLET, FILM COATED ORAL at 22:30

## 2024-08-04 RX ADMIN — METOPROLOL SUCCINATE 50 MG: 50 TABLET, EXTENDED RELEASE ORAL at 08:34

## 2024-08-04 RX ADMIN — LACOSAMIDE 100 MG: 100 TABLET, FILM COATED ORAL at 08:35

## 2024-08-04 RX ADMIN — SACUBITRIL AND VALSARTAN 1 TABLET: 24; 26 TABLET, FILM COATED ORAL at 22:29

## 2024-08-04 RX ADMIN — Medication: at 08:42

## 2024-08-04 RX ADMIN — MIRTAZAPINE 7.5 MG: 15 TABLET, FILM COATED ORAL at 22:30

## 2024-08-04 ASSESSMENT — PAIN SCALES - GENERAL: PAINLEVEL_OUTOF10: 0

## 2024-08-04 NOTE — PROGRESS NOTES
[facility] PULMONARY,CRITICAL CARE & SLEEP   Shonjack Arriaga MD/Jonathon ARGUELLES AGACHRISTINAP-BC, NP-C      Karin ARGUELLES NP-C    Hakan ARGUELLES NP-C                                         Pulmonary Progress Note    Patient - Michael Mcghee   Age - 88 y.o.   - 1935  MRN - [id number]  [id number]  Date of Admission - 2024 11:18 AM    Consulting Service/Physician:       Primary Care Physician: Bruno Metcalf, APRN - CNP    SUBJECTIVE:     Chief Complaint:   Chief Complaint   Patient presents with    Altered Mental Status     Subjective:    He seems pretty good today, very alert.  He tells me the wrist pain has improved.  He states he is try to keep it up on a pillow.  He asked me what is going on why my getting out of here.    VITALS  BP (!) 156/76   Pulse 66   Temp 97.9 °F (36.6 °C) (Oral)   Resp 20   Ht 1.778 m (5' 10\")   Wt 101.6 kg (223 lb 15.8 oz)   SpO2 95%   BMI 32.14 kg/m²   Wt Readings from Last 3 Encounters:   24 101.6 kg (223 lb 15.8 oz)   07/10/24 90.7 kg (200 lb)   24 100.2 kg (221 lb)     I/O (24 Hours)    Intake/Output Summary (Last 24 hours) at 2024 1251  Last data filed at 2024 0012  Gross per 24 hour   Intake 1322.23 ml   Output --   Net 1322.23 ml     Ventilator:   Settings  FiO2 : 35 %  Insp Rise Time (%): 1 %  Exam:   Physical Exam   Constitutional:  awake, alert, somewhat oriented, very pleasant this morning, not in any distress  HENT: Unremarkable  Head: Normocephalic and atraumatic.   Eyes: EOM are normal. Pupils are equal, round, and reactive to light.   Neck: Neck supple.   Cardiovascular:  Regular rate and rhythm.  Normal heart tones.  No JVD.    Pulmonary/Chest: Lung sounds slightly diminished but clear, no adventitious sounds present, respirations easy nonlabored on room air  Abdominal: Soft. Bowel sounds are normal. There is no tenderness.   Musculoskeletal Debilitated, limited ROM to RUE due to  mg, IntraVENous, PRN, Valente León MD    [DISCONTINUED] ondansetron (ZOFRAN-ODT) disintegrating tablet 4 mg, 4 mg, Oral, Q8H PRN **OR** ondansetron (ZOFRAN) injection 4 mg, 4 mg, IntraVENous, Q6H PRN, Valente León MD    polyethylene glycol (GLYCOLAX) packet 17 g, 17 g, Oral, Daily PRN, Valente León MD    acetaminophen (TYLENOL) tablet 650 mg, 650 mg, Oral, Q6H PRN, 650 mg at 08/03/24 2140 **OR** acetaminophen (TYLENOL) suppository 650 mg, 650 mg, Rectal, Q6H PRN, Valente León MD    aspirin EC tablet 81 mg, 81 mg, Oral, Daily, Tresa Baez APRN - NP, 81 mg at 08/04/24 0835    atorvastatin (LIPITOR) tablet 20 mg, 20 mg, Oral, Nightly, Tresa Baez APRN - NP, 20 mg at 08/03/24 2139    bisacodyl (DULCOLAX) suppository 10 mg, 10 mg, Rectal, Daily PRN, Tresa Baez APRN - NP    tamsulosin (FLOMAX) capsule 0.4 mg, 0.4 mg, Oral, Nightly, Tresa Baez APRN - NP, 0.4 mg at 08/03/24 2139    furosemide (LASIX) tablet 20 mg, 20 mg, Oral, Daily, Tresa Baez APRN - NP, 20 mg at 08/04/24 0834    ammonium lactate (LAC-HYDRIN) 12 % lotion, , Topical, BID, Tresa Baez APRN - NP, Given at 08/04/24 0842    polyethylene glycol (GLYCOLAX) packet 17 g, 17 g, Oral, Nightly, Tresa Baez APRN - NP, 17 g at 08/03/24 2148    mirtazapine (REMERON) tablet 7.5 mg, 7.5 mg, Oral, Nightly, Tresa Baez APRN - NP, 7.5 mg at 08/03/24 2140    nitroGLYCERIN (NITROSTAT) SL tablet 0.4 mg, 0.4 mg, SubLINGual, Q5 Min PRN, Tresa Baez APRN - NP    ondansetron (ZOFRAN-ODT) disintegrating tablet 4 mg, 4 mg, Oral, Q6H PRN, Tresa Baez APRN - NP    oxyBUTYnin (DITROPAN-XL) extended release tablet 5 mg, 5 mg, Oral, BID, Tresa Baez APRN - NP, 5 mg at 08/04/24 0834    therapeutic multivitamin-minerals 1 tablet, 1 tablet, Oral, Daily, Tresa Baez APRN - NP, 1 tablet at 08/04/24 0834    calcium carb-cholecalciferol 600-10 MG-MCG

## 2024-08-04 NOTE — PROGRESS NOTES
[facility] Internal Medicine  Aquiles Taylor MD; Franki Hung MD; Valente León MD; MD Summer Harp MD; Aure Morales MD    [facility] Internal Medicine   IN-PATIENT SERVICE   [facility]    HISTORY AND PHYSICAL EXAMINATION            Date:   8/4/2024  Patient name:  Michael Mcghee  Date of admission:  7/30/2024 11:18 AM  MRN:   [id number]  Account:  [id number]  YOB: 1935  PCP:    Bruno Metcalf APRN - CNP  Room:   2099/2099-01  Code Status:    DNR-CCA    Chief Complaint:     Chief Complaint   Patient presents with    Altered Mental Status   Pt medical record and nursing staff    History Obtained From:     Pt medical record and nursing staff    History of Present Illness:     Michael Mcghee is a 88 y.o. Non- / non  male who presents with Altered Mental Status   and is admitted to the hospital for the management of Delirium.    Michael Mcghee is a 88 y.o. Non- / non  male with a history of CAD status post CABG 1990.  Cardiac cath 2024, hypertension, hyperlipidemia, subdural hematoma, and CHF who presents with and is admitted to the hospital for the management of Delirium.     According to patient, he is feeling quite well and is in no pain at this time.  The patient is oriented to self only.  According to the ER provider he has been increasingly disoriented at his extended care facility.  He was recently admitted and intubated for sepsis at another facility.  His imaging was negative for any acute findings.  There is concern for metabolic encephalopathy related to dehydration and NADIR.  His CODE STATUS is a DNR CCA.  Intubation allowed.  CPR is not allowed.  See documentation in the patient's chart.     During assessment the patient is pleasantly confused.  Cranial nerves II through VII appear grossly intact.  He is able to follow commands.  His heart sounds are S1, S2 no murmurs or rubs noted.  His  11.5 - 14.9 %    Platelets 150 150 - 450 k/uL    MPV 9.5 6.0 - 12.0 fL    Neutrophils % 56 36 - 66 %    Lymphocytes % 31 24 - 44 %    Monocytes % 7 1 - 7 %    Eosinophils % 5 (H) 0 - 4 %    Basophils % 1 0 - 2 %    Neutrophils Absolute 4.10 1.3 - 9.1 k/uL    Lymphocytes Absolute 2.20 1.0 - 4.8 k/uL    Monocytes Absolute 0.50 0.1 - 1.3 k/uL    Eosinophils Absolute 0.30 0.0 - 0.4 k/uL    Basophils Absolute 0.00 0.0 - 0.2 k/uL       Imaging/Diagnostics:  CT CHEST ABDOMEN PELVIS WO CONTRAST Additional Contrast? None    Result Date: 7/30/2024  Small left-sided pleural effusion with associated atelectasis or scarring in the left lower lobe. Nodularity in the superior aspect of the right upper lobe which measures 3.6 mm in transverse. Moderate stool in the rectum with associated mural thickening of the rectum and adjacent inflammatory change related to stercoral colitis.  There are findings related to constipation. Diverticulosis of the colon. Vascular disease.  There is dilatation of the ascending thoracic aorta which measures a maximum 4.1 cm. Additional findings noted above. RECOMMENDATIONS: Right solid pulmonary nodule within the upper lobe measuring 4 mm. Per Fleischner Society Guidelines, if patient is low risk for malignancy, no routine follow-up imaging is recommended. If patient is high risk for malignancy, a non-contrast Chest CT at 12 months is optional. If performed and the nodule is stable at 12 months, no further follow-up is recommended. These guidelines do not apply to immunocompromised patients and patients with cancer. Follow up in patients with significant comorbidities as clinically warranted. For lung cancer screening, adhere to Lung-RADS guidelines. Reference: Radiology. 2017; 284(1):228-43.     CT HEAD WO CONTRAST    Result Date: 7/30/2024  No acute intracranial abnormality. Age related findings in the brain and findings likely related to microvascular ischemic disease. Chronic appearing subdural

## 2024-08-04 NOTE — PROGRESS NOTES
Physical Therapy        Physical Therapy Cancel Note      DATE: 2024    NAME: Michael Mcghee  MRN: [id number]   : 1935      Patient not seen this date for Physical Therapy due to:    2024 at [phone]-  Pt refused PT evaluation.  Pt more alert with eyes open and conversing today.  Pt states,\"Not today.  I don't feel up to it.  I already worked today and I'm tired.  I didn't expect any therapy on .\"   Per charge nurse family Kalpana to make a decision on Monday regarding hospice care.      Electronically signed by Kori Barry PT on 2024 at 3:04 PM

## 2024-08-04 NOTE — CARE COORDINATION
ONGOING DISCHARGE PLANNING NOTE:    Writer reviewed LSW notes, and discharge plan is [facility] resident/bed hold. Can return when medically ready.    Will continue to follow for additional discharge needs.    Electronically signed by Claudia Zambrano RN on 8/4/2024 at 4:32 PM

## 2024-08-04 NOTE — PROGRESS NOTES
08/04/24 1339   Encounter Summary   Encounter Overview/Reason Volunteer Encounter   Service Provided For Patient   Referral/Consult From Rounding   Last Encounter  08/04/24   Complexity of Encounter Low   Spiritual/Emotional needs   Type Spiritual Support   Rituals, Rites and Sacraments   Type Buddhist Communion   Assessment/Intervention/Outcome   Intervention Prayer (assurance of)/[place]

## 2024-08-04 NOTE — PLAN OF CARE
Problem: Discharge Planning  Goal: Discharge to home or other facility with appropriate resources  8/4/2024 0242 by Jaelyn Moore, RN  Outcome: Progressing     Problem: Safety - Adult  Goal: Free from fall injury  8/4/2024 0242 by Jaelyn Moore, RN  Outcome: Progressing     Problem: Pain  Goal: Verbalizes/displays adequate comfort level or baseline comfort level  Outcome: Progressing

## 2024-08-04 NOTE — PLAN OF CARE
Problem: Discharge Planning  Goal: Discharge to home or other facility with appropriate resources  Outcome: Progressing     Problem: Skin/Tissue Integrity  Goal: Absence of new skin breakdown  Description: 1.  Monitor for areas of redness and/or skin breakdown  2.  Assess vascular access sites hourly  3.  Every 4-6 hours minimum:  Change oxygen saturation probe site  4.  Every 4-6 hours:  If on nasal continuous positive airway pressure, respiratory therapy assess nares and determine need for appliance change or resting period.  Outcome: Progressing     Problem: Safety - Adult  Goal: Free from fall injury  Outcome: Progressing     Problem: ABCDS Injury Assessment  Goal: Absence of physical injury  Outcome: Progressing  Flowsheets (Taken 8/4/2024 0844)  Absence of Physical Injury: Implement safety measures based on patient assessment     Problem: Neurosensory - Adult  Goal: Absence of seizures  Outcome: Progressing

## 2024-08-05 ENCOUNTER — APPOINTMENT (OUTPATIENT)
Dept: GENERAL RADIOLOGY | Age: 89
DRG: 682 | End: 2024-08-05
Payer: MEDICARE

## 2024-08-05 VITALS
WEIGHT: 223.99 LBS | SYSTOLIC BLOOD PRESSURE: 138 MMHG | HEIGHT: 70 IN | DIASTOLIC BLOOD PRESSURE: 61 MMHG | HEART RATE: 62 BPM | TEMPERATURE: 97.5 F | OXYGEN SATURATION: 94 % | BODY MASS INDEX: 32.07 KG/M2 | RESPIRATION RATE: 28 BRPM

## 2024-08-05 LAB
GLUCOSE BLD-MCNC: 104 MG/DL (ref 75–110)
GLUCOSE BLD-MCNC: 99 MG/DL (ref 75–110)
MICROORGANISM SPEC CULT: NORMAL
MICROORGANISM SPEC CULT: NORMAL
SERVICE CMNT-IMP: NORMAL
SERVICE CMNT-IMP: NORMAL
SPECIMEN DESCRIPTION: NORMAL
SPECIMEN DESCRIPTION: NORMAL

## 2024-08-05 PROCEDURE — 6370000000 HC RX 637 (ALT 250 FOR IP)

## 2024-08-05 PROCEDURE — 99239 HOSP IP/OBS DSCHRG MGMT >30: CPT | Performed by: INTERNAL MEDICINE

## 2024-08-05 PROCEDURE — 2580000003 HC RX 258: Performed by: INTERNAL MEDICINE

## 2024-08-05 PROCEDURE — 6370000000 HC RX 637 (ALT 250 FOR IP): Performed by: STUDENT IN AN ORGANIZED HEALTH CARE EDUCATION/TRAINING PROGRAM

## 2024-08-05 PROCEDURE — 82947 ASSAY GLUCOSE BLOOD QUANT: CPT

## 2024-08-05 PROCEDURE — 6360000002 HC RX W HCPCS: Performed by: INTERNAL MEDICINE

## 2024-08-05 PROCEDURE — 71045 X-RAY EXAM CHEST 1 VIEW: CPT

## 2024-08-05 RX ORDER — IPRATROPIUM BROMIDE AND ALBUTEROL SULFATE 2.5; .5 MG/3ML; MG/3ML
3 SOLUTION RESPIRATORY (INHALATION) EVERY 4 HOURS PRN
Qty: 360 ML | Refills: 0 | Status: SHIPPED | OUTPATIENT
Start: 2024-08-05

## 2024-08-05 RX ORDER — LACOSAMIDE 100 MG/1
100 TABLET ORAL 2 TIMES DAILY
Qty: 60 TABLET | Refills: 0 | Status: SHIPPED | OUTPATIENT
Start: 2024-08-05 | End: 2024-09-04

## 2024-08-05 RX ADMIN — LACOSAMIDE 100 MG: 100 TABLET, FILM COATED ORAL at 11:31

## 2024-08-05 RX ADMIN — VENLAFAXINE 37.5 MG: 37.5 TABLET ORAL at 11:31

## 2024-08-05 RX ADMIN — FUROSEMIDE 20 MG: 20 TABLET ORAL at 11:32

## 2024-08-05 RX ADMIN — SODIUM CHLORIDE, PRESERVATIVE FREE 10 ML: 5 INJECTION INTRAVENOUS at 11:32

## 2024-08-05 RX ADMIN — Medication 1 TABLET: at 11:32

## 2024-08-05 RX ADMIN — OXYBUTYNIN CHLORIDE 5 MG: 5 TABLET, EXTENDED RELEASE ORAL at 11:31

## 2024-08-05 RX ADMIN — SPIRONOLACTONE 12.5 MG: 25 TABLET ORAL at 11:31

## 2024-08-05 RX ADMIN — SACUBITRIL AND VALSARTAN 1 TABLET: 24; 26 TABLET, FILM COATED ORAL at 11:31

## 2024-08-05 RX ADMIN — ASPIRIN 81 MG: 81 TABLET, COATED ORAL at 11:31

## 2024-08-05 RX ADMIN — METOPROLOL SUCCINATE 50 MG: 50 TABLET, EXTENDED RELEASE ORAL at 11:31

## 2024-08-05 NOTE — PROGRESS NOTES
[facility] Internal Medicine  Aquiles Taylor MD; Franki Hung MD; Valente León MD; MD Summer Harp MD; Aure Morales MD    [facility] Internal Medicine   IN-PATIENT SERVICE   [facility]    HISTORY AND PHYSICAL EXAMINATION            Date:   8/5/2024  Patient name:  Michael Mcghee  Date of admission:  7/30/2024 11:18 AM  MRN:   [id number]  Account:  [id number]  YOB: 1935  PCP:    Bruno Metcalf APRN - CNP  Room:   2099/2099-01  Code Status:    DNR-CCA    Chief Complaint:     Chief Complaint   Patient presents with    Altered Mental Status   Pt medical record and nursing staff    History Obtained From:     Pt medical record and nursing staff    History of Present Illness:     Michael Mcghee is a 88 y.o. Non- / non  male who presents with Altered Mental Status   and is admitted to the hospital for the management of Delirium.    Michael Mcghee is a 88 y.o. Non- / non  male with a history of CAD status post CABG 1990.  Cardiac cath 2024, hypertension, hyperlipidemia, subdural hematoma, and CHF who presents with and is admitted to the hospital for the management of Delirium.     According to patient, he is feeling quite well and is in no pain at this time.  The patient is oriented to self only.  According to the ER provider he has been increasingly disoriented at his extended care facility.  He was recently admitted and intubated for sepsis at another facility.  His imaging was negative for any acute findings.  There is concern for metabolic encephalopathy related to dehydration and NADIR.  His CODE STATUS is a DNR CCA.  Intubation allowed.  CPR is not allowed.  See documentation in the patient's chart.     During assessment the patient is pleasantly confused.  Cranial nerves II through VII appear grossly intact.  He is able to follow commands.  His heart sounds are S1, S2 no murmurs or rubs noted.  His  Entresto  Swelling of right hand x-ray reviewed, patient does not have tenderness at the local site, has old fractures I suspect it is due to infiltration from IV line  POA, will have meeting with palliative care today  Likely discharge today to SNF    Consultations:   IP CONSULT TO SPIRITUAL SERVICES  IP CONSULT TO NEUROLOGY  IP CONSULT TO PALLIATIVE CARE  IP CONSULT TO PULMONOLOGY     Patient is admitted as inpatient status because of co-morbidities listed above, severity of signs and symptoms as outlined, requirement for current medical therapies and most importantly because of direct risk to patient if care not provided in a hospital setting.  Expected length of stay > 48 hours.    Ryan Deluna MD  8/5/2024  12:23 PM    Copy sent to Bruno Rene, APRN - CNP    Please note that this chart was generated using voice recognition Dragon dictation software.  Although every effort was made to ensure the accuracy of this automated transcription, some errors in transcription may have occurred.

## 2024-08-05 NOTE — PROGRESS NOTES
08/05/24 1404   Encounter Summary   Encounter Overview/Reason Attempted Encounter   Service Provided For Patient not available  (PT with staff)

## 2024-08-05 NOTE — PROGRESS NOTES
Attempted to give patient morning medications. Patient was upset and telling writer to leave him alone.     1106: reattempted to give medication. Pt refusing at this time.

## 2024-08-05 NOTE — PROGRESS NOTES
per tab 1 tablet, 1 tablet, Oral, Nightly, Tresa Baez L, APRN - NP, 1 tablet at 08/04/24 2229    sacubitril-valsartan (ENTRESTO) 24-26 MG per tablet 1 tablet, 1 tablet, Oral, BID, Bernhojose l, Tresa L, APRN - NP, 1 tablet at 08/05/24 1131    spironolactone (ALDACTONE) tablet 12.5 mg, 12.5 mg, Oral, Daily, Adrianhoffer Tresa L, APRN - NP, 12.5 mg at 08/05/24 1131    metoprolol succinate (TOPROL XL) extended release tablet 50 mg, 50 mg, Oral, Daily, Adrianhoffer, Tresa L, APRN - NP, 50 mg at 08/05/24 1131    venlafaxine (EFFEXOR) tablet 37.5 mg, 37.5 mg, Oral, BID, Adrianhoffer, Tresa L, APRN - NP, 37.5 mg at 08/05/24 1131    Lab Results:     Lab Results   Component Value Date    WBC 7.3 08/03/2024    HGB 13.1 (L) 08/03/2024    HCT 41.2 08/03/2024    MCV 92.5 08/03/2024     08/03/2024     Lab Results   Component Value Date    CALCIUM 8.6 08/03/2024     08/03/2024    K 3.6 (L) 08/03/2024    CO2 26 08/03/2024     08/03/2024    BUN 25 (H) 08/03/2024    CREATININE 1.2 08/03/2024       Lab Results   Component Value Date    INR 1.1 07/31/2024    PROTIME 14.8 (H) 07/31/2024       Radiology:   No new chest imaging    ASSESSMENT:       Altered mental status, concern for metabolic encephalopathy, recent sepsis admission, improved  Acute hypoxic respiratory failure, resolved, currently on room air  Acute on chronic hypercapnic respiratory failure, ABG 7.31, pCO2 63, O2 85, bicarb 31  Recent urosepsis/CAP, treated with IV cefepime and vancomycin [facility]  Recent NSTEMI, echo with EF 45 to 50%, grade 2 diastolic dysfunction  Acute on CKD-CR 1.2  CHF, echo with grade 2 diastolic dysfunction  CAD, history of CABG 1990  History of subdural hematoma  Possible seizures, on Vimpat  Remote smoking history, quit 1991  Denies COPD/asthma history  Probable JEFF, no PSG  DNR CCA, with intubation, palliative following for goals of care  PLAN:   Stable on room air  Awaiting family decisions re: hospice  Supportive

## 2024-08-05 NOTE — CARE COORDINATION
DISCHARGE PLANNING NOTE:    LSW following for discharge back to facility. Patient is a long term care resident at [facility], can return when medically ready for discharge per Lalitha in admissions.

## 2024-08-05 NOTE — CARE COORDINATION
Trans[facility] informed. Bedside nurse informed.     Number for Report: [phone]

## 2024-08-05 NOTE — PLAN OF CARE
Problem: Discharge Planning  Goal: Discharge to home or other facility with appropriate resources  8/5/2024 0349 by Jaelyn Moore RN  Outcome: Progressing  8/4/2024 1805 by Kelly Ferrari RN  Outcome: Progressing     Problem: Safety - Adult  Goal: Free from fall injury  8/5/2024 0349 by Jaelyn Moore RN  Outcome: Progressing  8/4/2024 1805 by Kelly Ferrari RN  Outcome: Progressing     Problem: Cardiovascular - Adult  Goal: Maintains optimal cardiac output and hemodynamic stability  Outcome: Progressing     Problem: Skin/Tissue Integrity  Goal: Absence of new skin breakdown  Description: 1.  Monitor for areas of redness and/or skin breakdown  2.  Assess vascular access sites hourly  3.  Every 4-6 hours minimum:  Change oxygen saturation probe site  4.  Every 4-6 hours:  If on nasal continuous positive airway pressure, respiratory therapy assess nares and determine need for appliance change or resting period.  8/5/2024 0349 by Jaelyn Moore RN  Outcome: Progressing

## 2024-08-05 NOTE — PROGRESS NOTES
I talk to the patient's niece by marriage and UNIQUE Marianne. She states that she is now very well updated, and she understands what is going on with the patient and as well the plan of care. Marianne states that she does give permission to send him back to [facility] and I tell her that he will be discharged to [facility] when he is medically stable. I as well update Claudia NEGRETE.     I call Mellsia back as well to again talk about his code status and resuscitation wishes for the patient. I explain in detail the DNRCC-A code status. I do tell Marianne that remains very assertive medical care and as well the patient has life limiting conditions that are not reversible and I just wanted to make sure she was well educated as she is his voice when he cannot be and that he has had confusion, memory issues and a dementia history.     I the explain as well the DNRCC as that is more conservative treatment as I explain that with assertive medical treatments, we would not be able to reverse sor cure his current progressing medical conditions and that at times we can cause more pain and suffering than any good. I explain the DNRCC as living out life with conservative treatment and to focus on medical treatments for infections like pneumonias or UTI's and that focus would be living life and treatment focused on comfort and living well.     Marianne was very appreciative for the information that is shared, and she as well had our contact information. .           Will follow for goals of care and support.         ..[facility] Palliative [facility]  Yadira Lopez RN,CHPN   [facility]: [phone]  [facility]: [phone]  [facility]: [phone]

## 2024-08-12 NOTE — DISCHARGE SUMMARY
IN-PATIENT SERVICE   [facility] Internal Medicine    Discharge Summary     Patient ID: Michael Mcghee  :  1935   MRN: [id number]     ACCOUNT:  [id number]   Patient's PCP: Bruno Metcalf APRN - CNP  Admit Date: 2024   Discharge Date: 2024    Length of Stay: 6  Code Status:  Prior  Admitting Physician: Valente León MD  Discharge Physician: Ryan Deluna MD     Active Discharge Diagnoses:     Primary Problem  Delirium      Hospital Problems  Active Hospital Problems    Diagnosis Date Noted    Acute right ankle pain [M25.571] 2024    Goals of care, counseling/discussion [Z71.89] 2024    DNR (do not resuscitate) discussion [Z71.89] 2024    ACP (advance care planning) [Z71.89] 2024    Palliative care encounter [Z51.5] 2024    Unable to ambulate [R26.2] 2024    Metabolic encephalopathy [G93.41] 2024    History of subdural hematoma [Z86.79] 2024    Localization-related (focal) (partial) idiopathic epilepsy and epileptic syndromes with seizures of localized onset, not intractable, without status epilepticus (HCC) [G40.009] 2024    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 male who was admitted for the management of Delirium , presented to ER with Altered Mental Status  Patient, his past medical history multimedical problems and coronary artery disease status post CABG, hypertension, hyperlipidemia subdural hematoma, CHF, admitted with delirium  Patient, evaluated by neurologist, has history of seizures, plan for EEG, patient refused EEG  CODE STATUS changed to DNR CCA  Patient has swelling of right hand, likely at the site of IV infiltration  X-ray, negative  Advised to keep arm elevated  Patient POA, will have meeting with palliative care

## 2024-10-10 ENCOUNTER — TELEPHONE (OUTPATIENT)
Dept: PRIMARY CARE CLINIC | Age: 89
End: 2024-10-10

## 2024-11-27 ENCOUNTER — OFFICE VISIT (OUTPATIENT)
Dept: NEUROLOGY | Age: 88
End: 2024-11-27
Payer: MEDICARE

## 2024-11-27 VITALS
SYSTOLIC BLOOD PRESSURE: 122 MMHG | BODY MASS INDEX: 26.94 KG/M2 | HEIGHT: 70 IN | DIASTOLIC BLOOD PRESSURE: 60 MMHG | WEIGHT: 188.2 LBS | HEART RATE: 50 BPM

## 2024-11-27 DIAGNOSIS — G40.009 LOCALIZATION-RELATED (FOCAL) (PARTIAL) IDIOPATHIC EPILEPSY AND EPILEPTIC SYNDROMES WITH SEIZURES OF LOCALIZED ONSET, NOT INTRACTABLE, WITHOUT STATUS EPILEPTICUS (HCC): ICD-10-CM

## 2024-11-27 DIAGNOSIS — Z86.79 HISTORY OF SUBDURAL HEMATOMA: ICD-10-CM

## 2024-11-27 DIAGNOSIS — F01.B3 MODERATE VASCULAR DEMENTIA WITH MOOD DISTURBANCE (HCC): Primary | ICD-10-CM

## 2024-11-27 PROCEDURE — G8417 CALC BMI ABV UP PARAM F/U: HCPCS | Performed by: PHYSICIAN ASSISTANT

## 2024-11-27 PROCEDURE — 99204 OFFICE O/P NEW MOD 45 MIN: CPT | Performed by: PHYSICIAN ASSISTANT

## 2024-11-27 PROCEDURE — 1123F ACP DISCUSS/DSCN MKR DOCD: CPT | Performed by: PHYSICIAN ASSISTANT

## 2024-11-27 PROCEDURE — 1036F TOBACCO NON-USER: CPT | Performed by: PHYSICIAN ASSISTANT

## 2024-11-27 PROCEDURE — 1159F MED LIST DOCD IN RCRD: CPT | Performed by: PHYSICIAN ASSISTANT

## 2024-11-27 PROCEDURE — G8484 FLU IMMUNIZE NO ADMIN: HCPCS | Performed by: PHYSICIAN ASSISTANT

## 2024-11-27 PROCEDURE — G8427 DOCREV CUR MEDS BY ELIG CLIN: HCPCS | Performed by: PHYSICIAN ASSISTANT

## 2024-11-27 RX ORDER — SENNOSIDES A AND B 8.6 MG/1
1 TABLET, FILM COATED ORAL DAILY
COMMUNITY

## 2024-11-27 RX ORDER — DIVALPROEX SODIUM 125 MG/1
125 TABLET, DELAYED RELEASE ORAL 2 TIMES DAILY
Qty: 60 TABLET | Refills: 2 | Status: SHIPPED | OUTPATIENT
Start: 2024-11-27

## 2024-12-30 DIAGNOSIS — F01.B3 MODERATE VASCULAR DEMENTIA WITH MOOD DISTURBANCE (HCC): ICD-10-CM

## 2024-12-31 NOTE — PROGRESS NOTES
Called and spoke with Kimberley, nurse at [facility], regarding patients valproate level. They advised he is most of the time compliant with his medications and takes his pills no problem. They advised he is on the Depakote 125 mg BID and that its few and far between that he refuses. They have not had any witnessed seizure activity.

## 2025-01-03 ENCOUNTER — TELEPHONE (OUTPATIENT)
Dept: NEUROLOGY | Age: 89
End: 2025-01-03

## 2025-01-03 NOTE — TELEPHONE ENCOUNTER
Called and spoke with Kimberley, nurse at [facility], regarding patients valproate level. They advised he is most of the time compliant with his medications and takes his pills no problem. They advised he is on the Depakote 125 mg BID and that its few and far between that he refuses. They have not had any witnessed seizure activity. Any changes you want to make regarding his medications? I advised that I would call if any additional changes were going to happen. Please advise, thank you!

## 2025-03-22 ENCOUNTER — APPOINTMENT (OUTPATIENT)
Dept: CT IMAGING | Age: 89
DRG: 682 | End: 2025-03-22
Payer: MEDICARE

## 2025-03-22 ENCOUNTER — APPOINTMENT (OUTPATIENT)
Dept: GENERAL RADIOLOGY | Age: 89
DRG: 682 | End: 2025-03-22
Payer: MEDICARE

## 2025-03-22 ENCOUNTER — HOSPITAL ENCOUNTER (INPATIENT)
Age: 89
LOS: 14 days | Discharge: HOSPICE | DRG: 682 | End: 2025-04-06
Attending: EMERGENCY MEDICINE | Admitting: INTERNAL MEDICINE
Payer: MEDICARE

## 2025-03-22 DIAGNOSIS — R06.02 SHORTNESS OF BREATH: ICD-10-CM

## 2025-03-22 DIAGNOSIS — N17.9 ACUTE RENAL FAILURE, UNSPECIFIED ACUTE RENAL FAILURE TYPE: Primary | ICD-10-CM

## 2025-03-22 DIAGNOSIS — E87.5 HYPERKALEMIA: ICD-10-CM

## 2025-03-22 DIAGNOSIS — K62.5 RECTAL BLEEDING: ICD-10-CM

## 2025-03-22 DIAGNOSIS — E16.2 HYPOGLYCEMIA: ICD-10-CM

## 2025-03-22 DIAGNOSIS — M25.552 LEFT HIP PAIN: ICD-10-CM

## 2025-03-22 LAB
ANION GAP SERPL CALCULATED.3IONS-SCNC: 18 MMOL/L (ref 9–16)
ANION GAP SERPL CALCULATED.3IONS-SCNC: 21 MMOL/L (ref 9–16)
BASOPHILS # BLD: 0 K/UL (ref 0–0.2)
BASOPHILS NFR BLD: 0 % (ref 0–2)
BUN SERPL-MCNC: 100 MG/DL (ref 8–23)
BUN SERPL-MCNC: 103 MG/DL (ref 8–23)
CALCIUM SERPL-MCNC: 9.6 MG/DL (ref 8.6–10.4)
CALCIUM SERPL-MCNC: 9.8 MG/DL (ref 8.6–10.4)
CHLORIDE SERPL-SCNC: 106 MMOL/L (ref 98–107)
CHLORIDE SERPL-SCNC: 111 MMOL/L (ref 98–107)
CHP ED QC CHECK: NORMAL
CK SERPL-CCNC: 1183 U/L (ref 39–308)
CO2 SERPL-SCNC: 21 MMOL/L (ref 20–31)
CO2 SERPL-SCNC: 21 MMOL/L (ref 20–31)
CREAT SERPL-MCNC: 5.6 MG/DL (ref 0.7–1.2)
CREAT SERPL-MCNC: 5.9 MG/DL (ref 0.7–1.2)
EOSINOPHIL # BLD: 0.16 K/UL (ref 0–0.4)
EOSINOPHILS RELATIVE PERCENT: 1 % (ref 0–4)
ERYTHROCYTE [DISTWIDTH] IN BLOOD BY AUTOMATED COUNT: 15.1 % (ref 11.5–14.9)
GFR, ESTIMATED: 9 ML/MIN/1.73M2
GFR, ESTIMATED: 9 ML/MIN/1.73M2
GLUCOSE BLD-MCNC: 112 MG/DL
GLUCOSE BLD-MCNC: 127 MG/DL (ref 75–110)
GLUCOSE BLD-MCNC: 53 MG/DL (ref 75–110)
GLUCOSE SERPL-MCNC: 318 MG/DL (ref 74–99)
GLUCOSE SERPL-MCNC: 87 MG/DL (ref 74–99)
HCT VFR BLD AUTO: 42.6 % (ref 41–53)
HGB BLD-MCNC: 13.3 G/DL (ref 13.5–17.5)
LYMPHOCYTES NFR BLD: 1.44 K/UL (ref 1–4.8)
LYMPHOCYTES RELATIVE PERCENT: 9 % (ref 24–44)
MCH RBC QN AUTO: 30 PG (ref 26–34)
MCHC RBC AUTO-ENTMCNC: 31.3 G/DL (ref 31–37)
MCV RBC AUTO: 95.9 FL (ref 80–100)
MONOCYTES NFR BLD: 0.64 K/UL (ref 0.1–1.3)
MONOCYTES NFR BLD: 4 % (ref 1–7)
MORPHOLOGY: ABNORMAL
NEUTROPHILS NFR BLD: 86 % (ref 36–66)
NEUTS SEG NFR BLD: 13.76 K/UL (ref 1.3–9.1)
PLATELET # BLD AUTO: 271 K/UL (ref 150–450)
PMV BLD AUTO: 9 FL (ref 6–12)
POTASSIUM SERPL-SCNC: 6.3 MMOL/L (ref 3.7–5.3)
POTASSIUM SERPL-SCNC: 6.6 MMOL/L (ref 3.7–5.3)
RBC # BLD AUTO: 4.44 M/UL (ref 4.5–5.9)
SODIUM SERPL-SCNC: 148 MMOL/L (ref 136–145)
SODIUM SERPL-SCNC: 150 MMOL/L (ref 136–145)
WBC OTHER # BLD: 16 K/UL (ref 3.5–11)

## 2025-03-22 PROCEDURE — 96375 TX/PRO/DX INJ NEW DRUG ADDON: CPT

## 2025-03-22 PROCEDURE — 73501 X-RAY EXAM HIP UNI 1 VIEW: CPT

## 2025-03-22 PROCEDURE — 85025 COMPLETE CBC W/AUTO DIFF WBC: CPT

## 2025-03-22 PROCEDURE — 2580000003 HC RX 258

## 2025-03-22 PROCEDURE — 6370000000 HC RX 637 (ALT 250 FOR IP)

## 2025-03-22 PROCEDURE — 73700 CT LOWER EXTREMITY W/O DYE: CPT

## 2025-03-22 PROCEDURE — 51798 US URINE CAPACITY MEASURE: CPT

## 2025-03-22 PROCEDURE — 82550 ASSAY OF CK (CPK): CPT

## 2025-03-22 PROCEDURE — 80048 BASIC METABOLIC PNL TOTAL CA: CPT

## 2025-03-22 PROCEDURE — 6360000002 HC RX W HCPCS

## 2025-03-22 PROCEDURE — 93005 ELECTROCARDIOGRAM TRACING: CPT

## 2025-03-22 PROCEDURE — 36415 COLL VENOUS BLD VENIPUNCTURE: CPT

## 2025-03-22 PROCEDURE — 82947 ASSAY GLUCOSE BLOOD QUANT: CPT

## 2025-03-22 PROCEDURE — 96365 THER/PROPH/DIAG IV INF INIT: CPT

## 2025-03-22 PROCEDURE — 99285 EMERGENCY DEPT VISIT HI MDM: CPT

## 2025-03-22 RX ORDER — DEXTROSE MONOHYDRATE 100 MG/ML
INJECTION, SOLUTION INTRAVENOUS CONTINUOUS PRN
Status: DISCONTINUED | OUTPATIENT
Start: 2025-03-22 | End: 2025-04-06 | Stop reason: HOSPADM

## 2025-03-22 RX ORDER — FUROSEMIDE 10 MG/ML
40 INJECTION INTRAMUSCULAR; INTRAVENOUS ONCE
Status: COMPLETED | OUTPATIENT
Start: 2025-03-23 | End: 2025-03-23

## 2025-03-22 RX ORDER — OXYCODONE HYDROCHLORIDE 5 MG/1
2.5 TABLET ORAL ONCE
Refills: 0 | Status: DISCONTINUED | OUTPATIENT
Start: 2025-03-22 | End: 2025-03-22

## 2025-03-22 RX ORDER — SODIUM CHLORIDE 9 MG/ML
INJECTION, SOLUTION INTRAVENOUS CONTINUOUS
Status: ACTIVE | OUTPATIENT
Start: 2025-03-23 | End: 2025-03-23

## 2025-03-22 RX ORDER — DEXTROSE MONOHYDRATE 100 MG/ML
INJECTION, SOLUTION INTRAVENOUS CONTINUOUS PRN
Status: DISCONTINUED | OUTPATIENT
Start: 2025-03-22 | End: 2025-03-24

## 2025-03-22 RX ORDER — ACETAMINOPHEN 325 MG/1
650 TABLET ORAL ONCE
Status: DISCONTINUED | OUTPATIENT
Start: 2025-03-22 | End: 2025-03-22

## 2025-03-22 RX ORDER — CALCIUM GLUCONATE 20 MG/ML
1000 INJECTION, SOLUTION INTRAVENOUS ONCE
Status: COMPLETED | OUTPATIENT
Start: 2025-03-22 | End: 2025-03-22

## 2025-03-22 RX ADMIN — DEXTROSE MONOHYDRATE 250 ML: 100 INJECTION, SOLUTION INTRAVENOUS at 20:59

## 2025-03-22 RX ADMIN — DEXTROSE MONOHYDRATE 125 ML: 100 INJECTION, SOLUTION INTRAVENOUS at 19:36

## 2025-03-22 RX ADMIN — INSULIN HUMAN 10 UNITS: 100 INJECTION, SOLUTION PARENTERAL at 21:09

## 2025-03-22 RX ADMIN — CALCIUM GLUCONATE 1000 MG: 20 INJECTION, SOLUTION INTRAVENOUS at 20:57

## 2025-03-22 RX ADMIN — HYDROMORPHONE HYDROCHLORIDE 0.25 MG: 1 INJECTION, SOLUTION INTRAMUSCULAR; INTRAVENOUS; SUBCUTANEOUS at 21:45

## 2025-03-22 ASSESSMENT — PAIN DESCRIPTION - LOCATION: LOCATION: HIP

## 2025-03-22 ASSESSMENT — PAIN SCALES - GENERAL
PAINLEVEL_OUTOF10: 3
PAINLEVEL_OUTOF10: 8

## 2025-03-22 ASSESSMENT — PAIN DESCRIPTION - DESCRIPTORS: DESCRIPTORS: DISCOMFORT

## 2025-03-22 ASSESSMENT — PAIN DESCRIPTION - ORIENTATION: ORIENTATION: LEFT

## 2025-03-22 ASSESSMENT — LIFESTYLE VARIABLES
HOW OFTEN DO YOU HAVE A DRINK CONTAINING ALCOHOL: NEVER
HOW MANY STANDARD DRINKS CONTAINING ALCOHOL DO YOU HAVE ON A TYPICAL DAY: PATIENT DOES NOT DRINK

## 2025-03-23 ENCOUNTER — APPOINTMENT (OUTPATIENT)
Dept: GENERAL RADIOLOGY | Age: 89
DRG: 682 | End: 2025-03-23
Payer: MEDICARE

## 2025-03-23 ENCOUNTER — APPOINTMENT (OUTPATIENT)
Dept: CT IMAGING | Age: 89
DRG: 682 | End: 2025-03-23
Payer: MEDICARE

## 2025-03-23 PROBLEM — N17.9 ACUTE RENAL FAILURE (ARF): Status: ACTIVE | Noted: 2025-03-23

## 2025-03-23 LAB
ALBUMIN SERPL-MCNC: 3 G/DL (ref 3.5–5.2)
ALP SERPL-CCNC: 67 U/L (ref 40–129)
ALT SERPL-CCNC: 31 U/L (ref 10–50)
AMMONIA PLAS-SCNC: 41 UMOL/L (ref 16–60)
ANION GAP SERPL CALCULATED.3IONS-SCNC: 19 MMOL/L (ref 9–16)
ANION GAP SERPL CALCULATED.3IONS-SCNC: 21 MMOL/L (ref 9–16)
AST SERPL-CCNC: 125 U/L (ref 10–50)
B-OH-BUTYR SERPL-MCNC: 0.26 MMOL/L (ref 0.02–0.27)
BACTERIA URNS QL MICRO: ABNORMAL
BASOPHILS # BLD: 0 K/UL (ref 0–0.2)
BASOPHILS NFR BLD: 0 % (ref 0–2)
BILIRUB SERPL-MCNC: 0.3 MG/DL (ref 0–1.2)
BILIRUB UR QL STRIP: ABNORMAL
BUN SERPL-MCNC: 109 MG/DL (ref 8–23)
BUN SERPL-MCNC: 110 MG/DL (ref 8–23)
C DIFF GDH + TOXINS A+B STL QL IA.RAPID: NEGATIVE
CALCIUM SERPL-MCNC: 9.8 MG/DL (ref 8.6–10.4)
CALCIUM SERPL-MCNC: 9.8 MG/DL (ref 8.6–10.4)
CASTS #/AREA URNS LPF: ABNORMAL /LPF
CASTS #/AREA URNS LPF: ABNORMAL /LPF
CHLORIDE SERPL-SCNC: 109 MMOL/L (ref 98–107)
CHLORIDE SERPL-SCNC: 109 MMOL/L (ref 98–107)
CHLORIDE UR-SCNC: 29 MMOL/L
CLARITY UR: CLEAR
CO2 SERPL-SCNC: 20 MMOL/L (ref 20–31)
CO2 SERPL-SCNC: 21 MMOL/L (ref 20–31)
COLOR UR: ABNORMAL
CREAT SERPL-MCNC: 6.5 MG/DL (ref 0.7–1.2)
CREAT SERPL-MCNC: 6.9 MG/DL (ref 0.7–1.2)
CREAT UR-MCNC: 201 MG/DL (ref 39–259)
DATE LAST DOSE: ABNORMAL
DATE, STOOL #1: NORMAL
EOSINOPHIL # BLD: 0 K/UL (ref 0–0.4)
EOSINOPHILS RELATIVE PERCENT: 0 % (ref 0–4)
EPI CELLS #/AREA URNS HPF: ABNORMAL /HPF
ERYTHROCYTE [DISTWIDTH] IN BLOOD BY AUTOMATED COUNT: 15.3 % (ref 11.5–14.9)
GFR, ESTIMATED: 7 ML/MIN/1.73M2
GFR, ESTIMATED: 8 ML/MIN/1.73M2
GLUCOSE BLD-MCNC: 111 MG/DL (ref 75–110)
GLUCOSE BLD-MCNC: 112 MG/DL (ref 75–110)
GLUCOSE BLD-MCNC: 115 MG/DL (ref 75–110)
GLUCOSE BLD-MCNC: 68 MG/DL (ref 75–110)
GLUCOSE BLD-MCNC: 76 MG/DL (ref 75–110)
GLUCOSE SERPL-MCNC: 116 MG/DL (ref 74–99)
GLUCOSE SERPL-MCNC: 131 MG/DL (ref 74–99)
GLUCOSE UR STRIP-MCNC: ABNORMAL MG/DL
HCT VFR BLD AUTO: 40.7 % (ref 41–53)
HEMOCCULT SP1 STL QL: NEGATIVE
HGB BLD-MCNC: 12.7 G/DL (ref 13.5–17.5)
HGB UR QL STRIP.AUTO: ABNORMAL
KETONES UR STRIP-MCNC: ABNORMAL MG/DL
LACTATE BLDV-SCNC: 4 MMOL/L (ref 0.5–2.2)
LEUKOCYTE ESTERASE UR QL STRIP: ABNORMAL
LYMPHOCYTES NFR BLD: 1.23 K/UL (ref 1–4.8)
LYMPHOCYTES RELATIVE PERCENT: 8 % (ref 24–44)
MCH RBC QN AUTO: 30.4 PG (ref 26–34)
MCHC RBC AUTO-ENTMCNC: 31.2 G/DL (ref 31–37)
MCV RBC AUTO: 97.4 FL (ref 80–100)
MONOCYTES NFR BLD: 1.54 K/UL (ref 0.1–1.3)
MONOCYTES NFR BLD: 10 % (ref 1–7)
MORPHOLOGY: NORMAL
NEUTROPHILS NFR BLD: 82 % (ref 36–66)
NEUTS SEG NFR BLD: 12.63 K/UL (ref 1.3–9.1)
NITRITE UR QL STRIP: NEGATIVE
OSMOLALITY UR: 396 MOSM/KG (ref 80–1300)
PH UR STRIP: 5 [PH] (ref 5–8)
PLATELET # BLD AUTO: 245 K/UL (ref 150–450)
PMV BLD AUTO: 8.8 FL (ref 6–12)
POTASSIUM SERPL-SCNC: 5.7 MMOL/L (ref 3.7–5.3)
POTASSIUM SERPL-SCNC: 5.8 MMOL/L (ref 3.7–5.3)
POTASSIUM, UR: 71.5 MMOL/L
PROT SERPL-MCNC: 6.8 G/DL (ref 6.6–8.7)
PROT UR STRIP-MCNC: ABNORMAL MG/DL
RBC # BLD AUTO: 4.18 M/UL (ref 4.5–5.9)
RBC #/AREA URNS HPF: ABNORMAL /HPF
SODIUM SERPL-SCNC: 148 MMOL/L (ref 136–145)
SODIUM SERPL-SCNC: 151 MMOL/L (ref 136–145)
SODIUM UR-SCNC: 31 MMOL/L
SP GR UR STRIP: 1.02 (ref 1–1.03)
SPECIMEN DESCRIPTION: NORMAL
TIME, STOOL #1: 1010
TME LAST DOSE: ABNORMAL H
TOTAL PROTEIN, URINE: 26 MG/DL
UROBILINOGEN UR STRIP-ACNC: NORMAL EU/DL (ref 0–1)
VALPROATE SERPL-MCNC: 15 UG/ML (ref 50–125)
VANCOMYCIN DOSE: ABNORMAL MG
WBC #/AREA URNS HPF: ABNORMAL /HPF
WBC OTHER # BLD: 15.4 K/UL (ref 3.5–11)

## 2025-03-23 PROCEDURE — 6360000002 HC RX W HCPCS

## 2025-03-23 PROCEDURE — 82010 KETONE BODYS QUAN: CPT

## 2025-03-23 PROCEDURE — 84300 ASSAY OF URINE SODIUM: CPT

## 2025-03-23 PROCEDURE — 6370000000 HC RX 637 (ALT 250 FOR IP): Performed by: INTERNAL MEDICINE

## 2025-03-23 PROCEDURE — 82570 ASSAY OF URINE CREATININE: CPT

## 2025-03-23 PROCEDURE — 80053 COMPREHEN METABOLIC PANEL: CPT

## 2025-03-23 PROCEDURE — 2580000003 HC RX 258: Performed by: INTERNAL MEDICINE

## 2025-03-23 PROCEDURE — 82140 ASSAY OF AMMONIA: CPT

## 2025-03-23 PROCEDURE — 99223 1ST HOSP IP/OBS HIGH 75: CPT | Performed by: INTERNAL MEDICINE

## 2025-03-23 PROCEDURE — 2060000000 HC ICU INTERMEDIATE R&B

## 2025-03-23 PROCEDURE — 87324 CLOSTRIDIUM AG IA: CPT

## 2025-03-23 PROCEDURE — 2500000003 HC RX 250 WO HCPCS

## 2025-03-23 PROCEDURE — 87506 IADNA-DNA/RNA PROBE TQ 6-11: CPT

## 2025-03-23 PROCEDURE — 70450 CT HEAD/BRAIN W/O DYE: CPT

## 2025-03-23 PROCEDURE — 83605 ASSAY OF LACTIC ACID: CPT

## 2025-03-23 PROCEDURE — 2500000003 HC RX 250 WO HCPCS: Performed by: INTERNAL MEDICINE

## 2025-03-23 PROCEDURE — 81001 URINALYSIS AUTO W/SCOPE: CPT

## 2025-03-23 PROCEDURE — 83935 ASSAY OF URINE OSMOLALITY: CPT

## 2025-03-23 PROCEDURE — 96375 TX/PRO/DX INJ NEW DRUG ADDON: CPT

## 2025-03-23 PROCEDURE — 82270 OCCULT BLOOD FECES: CPT

## 2025-03-23 PROCEDURE — 73502 X-RAY EXAM HIP UNI 2-3 VIEWS: CPT

## 2025-03-23 PROCEDURE — 82436 ASSAY OF URINE CHLORIDE: CPT

## 2025-03-23 PROCEDURE — 6370000000 HC RX 637 (ALT 250 FOR IP)

## 2025-03-23 PROCEDURE — 36415 COLL VENOUS BLD VENIPUNCTURE: CPT

## 2025-03-23 PROCEDURE — 87449 NOS EACH ORGANISM AG IA: CPT

## 2025-03-23 PROCEDURE — 84133 ASSAY OF URINE POTASSIUM: CPT

## 2025-03-23 PROCEDURE — 71045 X-RAY EXAM CHEST 1 VIEW: CPT

## 2025-03-23 PROCEDURE — 84156 ASSAY OF PROTEIN URINE: CPT

## 2025-03-23 PROCEDURE — 85025 COMPLETE CBC W/AUTO DIFF WBC: CPT

## 2025-03-23 PROCEDURE — 74018 RADEX ABDOMEN 1 VIEW: CPT

## 2025-03-23 PROCEDURE — 74176 CT ABD & PELVIS W/O CONTRAST: CPT

## 2025-03-23 PROCEDURE — 2580000003 HC RX 258

## 2025-03-23 PROCEDURE — 80048 BASIC METABOLIC PNL TOTAL CA: CPT

## 2025-03-23 PROCEDURE — 80164 ASSAY DIPROPYLACETIC ACD TOT: CPT

## 2025-03-23 RX ORDER — MIRTAZAPINE 15 MG/1
7.5 TABLET, FILM COATED ORAL NIGHTLY
Status: DISCONTINUED | OUTPATIENT
Start: 2025-03-23 | End: 2025-04-06 | Stop reason: HOSPADM

## 2025-03-23 RX ORDER — HEPARIN SODIUM 5000 [USP'U]/ML
5000 INJECTION, SOLUTION INTRAVENOUS; SUBCUTANEOUS EVERY 8 HOURS SCHEDULED
Status: DISCONTINUED | OUTPATIENT
Start: 2025-03-23 | End: 2025-04-06 | Stop reason: HOSPADM

## 2025-03-23 RX ORDER — SODIUM CHLORIDE 450 MG/100ML
INJECTION, SOLUTION INTRAVENOUS CONTINUOUS
Status: DISCONTINUED | OUTPATIENT
Start: 2025-03-23 | End: 2025-03-24

## 2025-03-23 RX ORDER — ONDANSETRON 2 MG/ML
4 INJECTION INTRAMUSCULAR; INTRAVENOUS EVERY 6 HOURS PRN
Status: DISCONTINUED | OUTPATIENT
Start: 2025-03-23 | End: 2025-04-06 | Stop reason: HOSPADM

## 2025-03-23 RX ORDER — ACETAMINOPHEN 650 MG/1
650 SUPPOSITORY RECTAL EVERY 6 HOURS PRN
Status: DISCONTINUED | OUTPATIENT
Start: 2025-03-23 | End: 2025-04-06 | Stop reason: HOSPADM

## 2025-03-23 RX ORDER — IPRATROPIUM BROMIDE AND ALBUTEROL SULFATE 2.5; .5 MG/3ML; MG/3ML
1 SOLUTION RESPIRATORY (INHALATION) EVERY 4 HOURS PRN
Status: DISCONTINUED | OUTPATIENT
Start: 2025-03-23 | End: 2025-04-06 | Stop reason: HOSPADM

## 2025-03-23 RX ORDER — SODIUM CHLORIDE 0.9 % (FLUSH) 0.9 %
5-40 SYRINGE (ML) INJECTION PRN
Status: DISCONTINUED | OUTPATIENT
Start: 2025-03-23 | End: 2025-03-27 | Stop reason: SDUPTHER

## 2025-03-23 RX ORDER — HYDRALAZINE HYDROCHLORIDE 20 MG/ML
5 INJECTION INTRAMUSCULAR; INTRAVENOUS EVERY 6 HOURS PRN
Status: DISCONTINUED | OUTPATIENT
Start: 2025-03-23 | End: 2025-04-06 | Stop reason: HOSPADM

## 2025-03-23 RX ORDER — ACETAMINOPHEN 325 MG/1
650 TABLET ORAL EVERY 6 HOURS PRN
Status: DISCONTINUED | OUTPATIENT
Start: 2025-03-23 | End: 2025-04-06 | Stop reason: HOSPADM

## 2025-03-23 RX ORDER — SODIUM CHLORIDE 9 MG/ML
INJECTION, SOLUTION INTRAVENOUS CONTINUOUS
Status: DISCONTINUED | OUTPATIENT
Start: 2025-03-23 | End: 2025-03-23

## 2025-03-23 RX ORDER — ONDANSETRON 4 MG/1
4 TABLET, ORALLY DISINTEGRATING ORAL EVERY 8 HOURS PRN
Status: DISCONTINUED | OUTPATIENT
Start: 2025-03-23 | End: 2025-04-06 | Stop reason: HOSPADM

## 2025-03-23 RX ORDER — DIVALPROEX SODIUM 125 MG/1
125 TABLET, DELAYED RELEASE ORAL 2 TIMES DAILY
Status: DISCONTINUED | OUTPATIENT
Start: 2025-03-23 | End: 2025-03-23

## 2025-03-23 RX ORDER — ATORVASTATIN CALCIUM 20 MG/1
20 TABLET, FILM COATED ORAL NIGHTLY
Status: DISCONTINUED | OUTPATIENT
Start: 2025-03-23 | End: 2025-04-06 | Stop reason: HOSPADM

## 2025-03-23 RX ORDER — SODIUM CHLORIDE 9 MG/ML
INJECTION, SOLUTION INTRAVENOUS PRN
Status: DISCONTINUED | OUTPATIENT
Start: 2025-03-23 | End: 2025-03-27 | Stop reason: SDUPTHER

## 2025-03-23 RX ORDER — TAMSULOSIN HYDROCHLORIDE 0.4 MG/1
0.4 CAPSULE ORAL NIGHTLY
Status: DISCONTINUED | OUTPATIENT
Start: 2025-03-23 | End: 2025-03-30

## 2025-03-23 RX ADMIN — HYDRALAZINE HYDROCHLORIDE 5 MG: 20 INJECTION INTRAMUSCULAR; INTRAVENOUS at 05:41

## 2025-03-23 RX ADMIN — WATER 2.5 MG: 1 INJECTION INTRAMUSCULAR; INTRAVENOUS; SUBCUTANEOUS at 01:54

## 2025-03-23 RX ADMIN — SODIUM CHLORIDE: 0.9 INJECTION, SOLUTION INTRAVENOUS at 10:05

## 2025-03-23 RX ADMIN — VASOPRESSIN: 20 INJECTION, SOLUTION INTRAVENOUS at 20:18

## 2025-03-23 RX ADMIN — SODIUM CHLORIDE: 0.45 INJECTION, SOLUTION INTRAVENOUS at 11:40

## 2025-03-23 RX ADMIN — FUROSEMIDE 40 MG: 10 INJECTION, SOLUTION INTRAMUSCULAR; INTRAVENOUS at 00:24

## 2025-03-23 RX ADMIN — WATER 2.5 MG: 1 INJECTION INTRAMUSCULAR; INTRAVENOUS; SUBCUTANEOUS at 22:10

## 2025-03-23 RX ADMIN — HEPARIN SODIUM 5000 UNITS: 5000 INJECTION INTRAVENOUS; SUBCUTANEOUS at 22:17

## 2025-03-23 RX ADMIN — MIRTAZAPINE 7.5 MG: 15 TABLET, FILM COATED ORAL at 20:19

## 2025-03-23 RX ADMIN — Medication 16 G: at 20:50

## 2025-03-23 RX ADMIN — SODIUM ZIRCONIUM CYCLOSILICATE 10 G: 5 POWDER, FOR SUSPENSION ORAL at 04:29

## 2025-03-23 RX ADMIN — HEPARIN SODIUM 5000 UNITS: 5000 INJECTION INTRAVENOUS; SUBCUTANEOUS at 05:41

## 2025-03-23 RX ADMIN — SODIUM POLYSTYRENE SULFONATE 15 G: 15 SUSPENSION ORAL at 04:25

## 2025-03-23 RX ADMIN — DEXTROSE MONOHYDRATE: 100 INJECTION, SOLUTION INTRAVENOUS at 21:06

## 2025-03-23 RX ADMIN — TAMSULOSIN HYDROCHLORIDE 0.4 MG: 0.4 CAPSULE ORAL at 20:19

## 2025-03-23 RX ADMIN — VALPROATE SODIUM 65 MG: 100 INJECTION, SOLUTION INTRAVENOUS at 15:50

## 2025-03-23 RX ADMIN — HEPARIN SODIUM 5000 UNITS: 5000 INJECTION INTRAVENOUS; SUBCUTANEOUS at 12:49

## 2025-03-23 RX ADMIN — SODIUM POLYSTYRENE SULFONATE 30 G: 15 SUSPENSION ORAL at 12:49

## 2025-03-23 RX ADMIN — VALPROATE SODIUM 65 MG: 100 INJECTION, SOLUTION INTRAVENOUS at 22:06

## 2025-03-23 ASSESSMENT — PAIN SCALES - GENERAL: PAINLEVEL_OUTOF10: 0

## 2025-03-23 NOTE — FLOWSHEET NOTE
03/23/25 0527   Treatment Team Notification   Reason for Communication Abnormal vitals   Name of Team Member Notified Chaparrita Myles NP   Treatment Team Role Advanced Practice Nurse   Method of Communication Secure Message   Response See orders   Notification Time 0527 0527 -  Pt recently arrived to PCU. Vitals show /50 JASON and HR 70. Any additional interventions?     0533 - New order placed for hydralazine IV 5mg every 6 hrs for SBP > 160

## 2025-03-23 NOTE — H&P
mg/dL    Est, Glom Filt Rate 9 (L) >60 mL/min/1.73m2    Calcium 9.6 8.6 - 10.4 mg/dL   CBC with Auto Differential    Collection Time: 03/22/25  7:38 PM   Result Value Ref Range    WBC 16.0 (H) 3.5 - 11.0 k/uL    RBC 4.44 (L) 4.5 - 5.9 m/uL    Hemoglobin 13.3 (L) 13.5 - 17.5 g/dL    Hematocrit 42.6 41 - 53 %    MCV 95.9 80 - 100 fL    MCH 30.0 26 - 34 pg    MCHC 31.3 31 - 37 g/dL    RDW 15.1 (H) 11.5 - 14.9 %    Platelets 271 150 - 450 k/uL    MPV 9.0 6.0 - 12.0 fL    Neutrophils % 86 (H) 36 - 66 %    Lymphocytes % 9 (L) 24 - 44 %    Monocytes % 4 1 - 7 %    Eosinophils % 1 0 - 4 %    Basophils % 0 0 - 2 %    Neutrophils Absolute 13.76 (H) 1.3 - 9.1 k/uL    Lymphocytes Absolute 1.44 1.0 - 4.8 k/uL    Monocytes Absolute 0.64 0.1 - 1.3 k/uL    Eosinophils Absolute 0.16 0.0 - 0.4 k/uL    Basophils Absolute 0.00 0.0 - 0.2 k/uL    Morphology ANISOCYTOSIS PRESENT    CK    Collection Time: 03/22/25  7:38 PM   Result Value Ref Range    Total CK 1,183 (H) 39 - 308 U/L   POC Glucose Fingerstick    Collection Time: 03/22/25  8:48 PM   Result Value Ref Range    POC Glucose 127 (H) 75 - 110 mg/dL   EKG 12 Lead    Collection Time: 03/22/25 10:08 PM   Result Value Ref Range    Ventricular Rate 85 BPM    Atrial Rate 85 BPM    P-R Interval 208 ms    QRS Duration 100 ms    Q-T Interval 368 ms    QTc Calculation (Bazett) 437 ms    P Axis 64 degrees    R Axis 40 degrees    T Axis 92 degrees   POC Glucose Fingerstick    Collection Time: 03/22/25 10:39 PM   Result Value Ref Range    POC Glucose 112 (H) 75 - 110 mg/dL   POCT Glucose    Collection Time: 03/22/25 10:40 PM   Result Value Ref Range    Glucose 112 mg/dL    QC OK? ok    Basic Metabolic Panel    Collection Time: 03/22/25 11:08 PM   Result Value Ref Range    Sodium 150 (H) 136 - 145 mmol/L    Potassium 6.6 (HH) 3.7 - 5.3 mmol/L    Chloride 111 (H) 98 - 107 mmol/L    CO2 21 20 - 31 mmol/L    Anion Gap 18 (H) 9 - 16 mmol/L    Glucose 87 74 - 99 mg/dL     (HH) 8 - 23 mg/dL

## 2025-03-23 NOTE — ED PROVIDER NOTES
[facility] EMERGENCY DEPARTMENT  Emergency Department Encounter  Emergency Medicine Resident     Pt Name:Michael Mcghee  MRN: [id number]  Birthdate 11/17/1935  Date of evaluation: 3/23/25  PCP:  Bruno Metcalf APRN - CNP  Note Started: 3:37 AM EDT      CHIEF COMPLAINT       Chief Complaint   Patient presents with    Aggressive Behavior    Hypoglycemia    Hip Pain       HISTORY OF PRESENT ILLNESS  (Location/Symptom, Timing/Onset, Context/Setting, Quality, Duration, Modifying Factors, Severity.)      Michael Mcghee is a 89 y.o. male who presents from outside facility for concerns of increased agitation/aggressive behavior, left hip pain for the last few days.  Patient was found to be hypoglycemic by EMS, received dextrose.  Patient complaining of pain in his bottom, hip, not answering most questions.    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     has a past surgical history that includes Ankle surgery (Bilateral); Cardiac surgery; Cardiac surgery; joint replacement (Right); Nerve Block (01/21/2014); Cardiac catheterization (03/14/2024); and Cardiac procedure (N/A, 3/15/2024).    Social History     Socioeconomic History    Marital status:      Spouse name: Not on file

## 2025-03-24 ENCOUNTER — APPOINTMENT (OUTPATIENT)
Dept: GENERAL RADIOLOGY | Age: 89
DRG: 682 | End: 2025-03-24
Payer: MEDICARE

## 2025-03-24 ENCOUNTER — APPOINTMENT (OUTPATIENT)
Dept: INTERVENTIONAL RADIOLOGY/VASCULAR | Age: 89
DRG: 682 | End: 2025-03-24
Payer: MEDICARE

## 2025-03-24 ENCOUNTER — APPOINTMENT (OUTPATIENT)
Dept: ULTRASOUND IMAGING | Age: 89
DRG: 682 | End: 2025-03-24
Payer: MEDICARE

## 2025-03-24 ENCOUNTER — TELEPHONE (OUTPATIENT)
Dept: UROLOGY | Age: 89
End: 2025-03-24

## 2025-03-24 PROBLEM — F03.90 DEMENTIA (HCC): Status: ACTIVE | Noted: 2025-03-24

## 2025-03-24 LAB
ABSOLUTE BANDS: 1.14 K/UL (ref 0–1)
ALBUMIN SERPL-MCNC: 2.7 G/DL (ref 3.5–5.2)
ALP SERPL-CCNC: 66 U/L (ref 40–129)
ALT SERPL-CCNC: 40 U/L (ref 10–50)
ANION GAP SERPL CALCULATED.3IONS-SCNC: 13 MMOL/L (ref 9–16)
ANION GAP SERPL CALCULATED.3IONS-SCNC: 18 MMOL/L (ref 9–16)
ANION GAP SERPL CALCULATED.3IONS-SCNC: 22 MMOL/L (ref 9–16)
ANION GAP SERPL CALCULATED.3IONS-SCNC: 22 MMOL/L (ref 9–16)
AST SERPL-CCNC: 163 U/L (ref 10–50)
BANDS: 6 % (ref 0–10)
BASOPHILS # BLD: 0 K/UL (ref 0–0.2)
BASOPHILS NFR BLD: 0 % (ref 0–2)
BILIRUB SERPL-MCNC: 0.4 MG/DL (ref 0–1.2)
BNP SERPL-MCNC: 5897 PG/ML (ref 0–300)
BUN SERPL-MCNC: 107 MG/DL (ref 8–23)
BUN SERPL-MCNC: 109 MG/DL (ref 8–23)
BUN SERPL-MCNC: 109 MG/DL (ref 8–23)
BUN SERPL-MCNC: 52 MG/DL (ref 8–23)
C3 SERPL-MCNC: 123 MG/DL (ref 90–180)
C4 SERPL-MCNC: 34 MG/DL (ref 10–40)
CALCIUM SERPL-MCNC: 7.7 MG/DL (ref 8.6–10.4)
CALCIUM SERPL-MCNC: 8.4 MG/DL (ref 8.6–10.4)
CALCIUM SERPL-MCNC: 9.1 MG/DL (ref 8.6–10.4)
CALCIUM SERPL-MCNC: 9.1 MG/DL (ref 8.6–10.4)
CAMPYLOBACTER DNA SPEC NAA+PROBE: NORMAL
CHLORIDE SERPL-SCNC: 105 MMOL/L (ref 98–107)
CHLORIDE SERPL-SCNC: 109 MMOL/L (ref 98–107)
CHLORIDE SERPL-SCNC: 110 MMOL/L (ref 98–107)
CHLORIDE SERPL-SCNC: 110 MMOL/L (ref 98–107)
CK SERPL-CCNC: 4102 U/L (ref 39–308)
CO2 SERPL-SCNC: 20 MMOL/L (ref 20–31)
CO2 SERPL-SCNC: 21 MMOL/L (ref 20–31)
CO2 SERPL-SCNC: 21 MMOL/L (ref 20–31)
CO2 SERPL-SCNC: 22 MMOL/L (ref 20–31)
CREAT SERPL-MCNC: 3 MG/DL (ref 0.7–1.2)
CREAT SERPL-MCNC: 5.7 MG/DL (ref 0.7–1.2)
CREAT SERPL-MCNC: 6.6 MG/DL (ref 0.7–1.2)
CREAT SERPL-MCNC: 6.6 MG/DL (ref 0.7–1.2)
CRP SERPL HS-MCNC: 248 MG/L (ref 0–5)
EKG ATRIAL RATE: 85 BPM
EKG P AXIS: 64 DEGREES
EKG P-R INTERVAL: 208 MS
EKG Q-T INTERVAL: 368 MS
EKG QRS DURATION: 100 MS
EKG QTC CALCULATION (BAZETT): 437 MS
EKG R AXIS: 40 DEGREES
EKG T AXIS: 92 DEGREES
EKG VENTRICULAR RATE: 85 BPM
EOSINOPHIL # BLD: 0 K/UL (ref 0–0.4)
EOSINOPHILS RELATIVE PERCENT: 0 % (ref 0–4)
ERYTHROCYTE [DISTWIDTH] IN BLOOD BY AUTOMATED COUNT: 15.2 % (ref 11.5–14.9)
ERYTHROCYTE [SEDIMENTATION RATE] IN BLOOD BY PHOTOMETRIC METHOD: 26 MM/HR (ref 0–20)
ETEC ELTA+ESTB GENES STL QL NAA+PROBE: NORMAL
GFR, ESTIMATED: 19 ML/MIN/1.73M2
GFR, ESTIMATED: 7 ML/MIN/1.73M2
GFR, ESTIMATED: 7 ML/MIN/1.73M2
GFR, ESTIMATED: 9 ML/MIN/1.73M2
GLUCOSE BLD-MCNC: 105 MG/DL (ref 75–110)
GLUCOSE BLD-MCNC: 41 MG/DL (ref 75–110)
GLUCOSE BLD-MCNC: 47 MG/DL (ref 75–110)
GLUCOSE BLD-MCNC: 79 MG/DL (ref 75–110)
GLUCOSE BLD-MCNC: 91 MG/DL (ref 75–110)
GLUCOSE BLD-MCNC: 92 MG/DL (ref 75–110)
GLUCOSE BLD-MCNC: 98 MG/DL (ref 75–110)
GLUCOSE SERPL-MCNC: 110 MG/DL (ref 74–99)
GLUCOSE SERPL-MCNC: 84 MG/DL (ref 74–99)
GLUCOSE SERPL-MCNC: 85 MG/DL (ref 74–99)
GLUCOSE SERPL-MCNC: 91 MG/DL (ref 74–99)
HBV CORE IGM SERPL QL IA: NONREACTIVE
HBV SURFACE AB SERPL IA-ACNC: <3.5 MIU/ML
HBV SURFACE AG SERPL QL IA: NONREACTIVE
HCT VFR BLD AUTO: 38.8 % (ref 41–53)
HCV AB SERPL QL IA: NONREACTIVE
HGB BLD-MCNC: 12.1 G/DL (ref 13.5–17.5)
LACTATE BLDV-SCNC: 2 MMOL/L (ref 0.5–2.2)
LACTATE BLDV-SCNC: 2.7 MMOL/L (ref 0.5–2.2)
LYMPHOCYTES NFR BLD: 1.71 K/UL (ref 1–4.8)
LYMPHOCYTES RELATIVE PERCENT: 9 % (ref 24–44)
MAGNESIUM SERPL-MCNC: 3.1 MG/DL (ref 1.6–2.4)
MCH RBC QN AUTO: 30.1 PG (ref 26–34)
MCHC RBC AUTO-ENTMCNC: 31.1 G/DL (ref 31–37)
MCV RBC AUTO: 96.9 FL (ref 80–100)
MONOCYTES NFR BLD: 1.33 K/UL (ref 0.1–1.3)
MONOCYTES NFR BLD: 7 % (ref 1–7)
MORPHOLOGY: ABNORMAL
MYELOCYTES ABSOLUTE COUNT: 0.19 K/UL
MYELOCYTES: 1 %
NEUTROPHILS NFR BLD: 77 % (ref 36–66)
NEUTS SEG NFR BLD: 14.63 K/UL (ref 1.3–9.1)
P SHIGELLOIDES DNA STL QL NAA+PROBE: NORMAL
PLATELET # BLD AUTO: 196 K/UL (ref 150–450)
PMV BLD AUTO: 8.9 FL (ref 6–12)
POTASSIUM SERPL-SCNC: 3.4 MMOL/L (ref 3.7–5.3)
POTASSIUM SERPL-SCNC: 4 MMOL/L (ref 3.7–5.3)
POTASSIUM SERPL-SCNC: 5 MMOL/L (ref 3.7–5.3)
POTASSIUM SERPL-SCNC: 5.1 MMOL/L (ref 3.7–5.3)
PROT SERPL-MCNC: 6.3 G/DL (ref 6.6–8.7)
RBC # BLD AUTO: 4.01 M/UL (ref 4.5–5.9)
SALMONELLA DNA SPEC QL NAA+PROBE: NORMAL
SHIGA TOXIN STX GENE SPEC NAA+PROBE: NORMAL
SHIGELLA DNA SPEC QL NAA+PROBE: NORMAL
SODIUM SERPL-SCNC: 138 MMOL/L (ref 136–145)
SODIUM SERPL-SCNC: 150 MMOL/L (ref 136–145)
SODIUM SERPL-SCNC: 152 MMOL/L (ref 136–145)
SODIUM SERPL-SCNC: 153 MMOL/L (ref 136–145)
SPECIMEN DESCRIPTION: NORMAL
TROPONIN I SERPL HS-MCNC: 312 NG/L (ref 0–22)
V CHOL+PARA RFBL+TRKH+TNAA STL QL NAA+PR: NORMAL
WBC OTHER # BLD: 19 K/UL (ref 3.5–11)
Y ENTERO RECN STL QL NAA+PROBE: NORMAL

## 2025-03-24 PROCEDURE — 6370000000 HC RX 637 (ALT 250 FOR IP): Performed by: INTERNAL MEDICINE

## 2025-03-24 PROCEDURE — 86317 IMMUNOASSAY INFECTIOUS AGENT: CPT

## 2025-03-24 PROCEDURE — 84484 ASSAY OF TROPONIN QUANT: CPT

## 2025-03-24 PROCEDURE — 86803 HEPATITIS C AB TEST: CPT

## 2025-03-24 PROCEDURE — 36569 INSJ PICC 5 YR+ W/O IMAGING: CPT

## 2025-03-24 PROCEDURE — 2580000003 HC RX 258: Performed by: INTERNAL MEDICINE

## 2025-03-24 PROCEDURE — 80048 BASIC METABOLIC PNL TOTAL CA: CPT

## 2025-03-24 PROCEDURE — 74018 RADEX ABDOMEN 1 VIEW: CPT

## 2025-03-24 PROCEDURE — 71045 X-RAY EXAM CHEST 1 VIEW: CPT

## 2025-03-24 PROCEDURE — 85025 COMPLETE CBC W/AUTO DIFF WBC: CPT

## 2025-03-24 PROCEDURE — 86705 HEP B CORE ANTIBODY IGM: CPT

## 2025-03-24 PROCEDURE — 86140 C-REACTIVE PROTEIN: CPT

## 2025-03-24 PROCEDURE — 76775 US EXAM ABDO BACK WALL LIM: CPT

## 2025-03-24 PROCEDURE — 86225 DNA ANTIBODY NATIVE: CPT

## 2025-03-24 PROCEDURE — 6360000002 HC RX W HCPCS: Performed by: RADIOLOGY

## 2025-03-24 PROCEDURE — 6360000002 HC RX W HCPCS

## 2025-03-24 PROCEDURE — 86160 COMPLEMENT ANTIGEN: CPT

## 2025-03-24 PROCEDURE — 82550 ASSAY OF CK (CPK): CPT

## 2025-03-24 PROCEDURE — 83880 ASSAY OF NATRIURETIC PEPTIDE: CPT

## 2025-03-24 PROCEDURE — 2709999900 IR NONTUNNELED VASCULAR CATHETER > 5 YEARS

## 2025-03-24 PROCEDURE — 87340 HEPATITIS B SURFACE AG IA: CPT

## 2025-03-24 PROCEDURE — 86038 ANTINUCLEAR ANTIBODIES: CPT

## 2025-03-24 PROCEDURE — 77001 FLUOROGUIDE FOR VEIN DEVICE: CPT

## 2025-03-24 PROCEDURE — 2580000003 HC RX 258

## 2025-03-24 PROCEDURE — 6360000002 HC RX W HCPCS: Performed by: INTERNAL MEDICINE

## 2025-03-24 PROCEDURE — 83735 ASSAY OF MAGNESIUM: CPT

## 2025-03-24 PROCEDURE — B518ZZA FLUOROSCOPY OF SUPERIOR VENA CAVA, GUIDANCE: ICD-10-PCS | Performed by: INTERNAL MEDICINE

## 2025-03-24 PROCEDURE — 2060000000 HC ICU INTERMEDIATE R&B

## 2025-03-24 PROCEDURE — 76937 US GUIDE VASCULAR ACCESS: CPT

## 2025-03-24 PROCEDURE — 90935 HEMODIALYSIS ONE EVALUATION: CPT

## 2025-03-24 PROCEDURE — 99222 1ST HOSP IP/OBS MODERATE 55: CPT | Performed by: NURSE PRACTITIONER

## 2025-03-24 PROCEDURE — 36415 COLL VENOUS BLD VENIPUNCTURE: CPT

## 2025-03-24 PROCEDURE — 87040 BLOOD CULTURE FOR BACTERIA: CPT

## 2025-03-24 PROCEDURE — 99233 SBSQ HOSP IP/OBS HIGH 50: CPT | Performed by: INTERNAL MEDICINE

## 2025-03-24 PROCEDURE — 6370000000 HC RX 637 (ALT 250 FOR IP)

## 2025-03-24 PROCEDURE — 5A1D70Z PERFORMANCE OF URINARY FILTRATION, INTERMITTENT, LESS THAN 6 HOURS PER DAY: ICD-10-PCS | Performed by: INTERNAL MEDICINE

## 2025-03-24 PROCEDURE — C1751 CATH, INF, PER/CENT/MIDLINE: HCPCS

## 2025-03-24 PROCEDURE — 2500000003 HC RX 250 WO HCPCS: Performed by: INTERNAL MEDICINE

## 2025-03-24 PROCEDURE — 36556 INSERT NON-TUNNEL CV CATH: CPT

## 2025-03-24 PROCEDURE — 80053 COMPREHEN METABOLIC PANEL: CPT

## 2025-03-24 PROCEDURE — 99214 OFFICE O/P EST MOD 30 MIN: CPT

## 2025-03-24 PROCEDURE — 93010 ELECTROCARDIOGRAM REPORT: CPT | Performed by: INTERNAL MEDICINE

## 2025-03-24 PROCEDURE — 85652 RBC SED RATE AUTOMATED: CPT

## 2025-03-24 PROCEDURE — 83605 ASSAY OF LACTIC ACID: CPT

## 2025-03-24 PROCEDURE — 82947 ASSAY GLUCOSE BLOOD QUANT: CPT

## 2025-03-24 PROCEDURE — 02HV33Z INSERTION OF INFUSION DEVICE INTO SUPERIOR VENA CAVA, PERCUTANEOUS APPROACH: ICD-10-PCS | Performed by: INTERNAL MEDICINE

## 2025-03-24 RX ORDER — MIDODRINE HYDROCHLORIDE 5 MG/1
5 TABLET ORAL
Status: DISCONTINUED | OUTPATIENT
Start: 2025-03-24 | End: 2025-03-24

## 2025-03-24 RX ORDER — HEPARIN SODIUM 1000 [USP'U]/ML
INJECTION, SOLUTION INTRAVENOUS; SUBCUTANEOUS PRN
Status: COMPLETED | OUTPATIENT
Start: 2025-03-24 | End: 2025-03-24

## 2025-03-24 RX ORDER — MIDODRINE HYDROCHLORIDE 10 MG/1
10 TABLET ORAL
Status: DISCONTINUED | OUTPATIENT
Start: 2025-03-24 | End: 2025-04-03

## 2025-03-24 RX ORDER — MIDODRINE HYDROCHLORIDE 5 MG/1
5 TABLET ORAL ONCE
Status: COMPLETED | OUTPATIENT
Start: 2025-03-24 | End: 2025-03-24

## 2025-03-24 RX ORDER — OXYCODONE AND ACETAMINOPHEN 5; 325 MG/1; MG/1
1 TABLET ORAL EVERY 4 HOURS PRN
Refills: 0 | Status: DISCONTINUED | OUTPATIENT
Start: 2025-03-24 | End: 2025-04-06 | Stop reason: HOSPADM

## 2025-03-24 RX ORDER — HYDROXYZINE HYDROCHLORIDE 25 MG/1
50 TABLET, FILM COATED ORAL 4 TIMES DAILY PRN
Status: DISCONTINUED | OUTPATIENT
Start: 2025-03-24 | End: 2025-04-06 | Stop reason: HOSPADM

## 2025-03-24 RX ORDER — SODIUM CHLORIDE 0.9 % (FLUSH) 0.9 %
5-40 SYRINGE (ML) INJECTION PRN
Status: DISCONTINUED | OUTPATIENT
Start: 2025-03-24 | End: 2025-04-06 | Stop reason: HOSPADM

## 2025-03-24 RX ORDER — 0.9 % SODIUM CHLORIDE 0.9 %
500 INTRAVENOUS SOLUTION INTRAVENOUS ONCE
Status: COMPLETED | OUTPATIENT
Start: 2025-03-24 | End: 2025-03-24

## 2025-03-24 RX ORDER — DEXTROSE MONOHYDRATE 50 MG/ML
INJECTION, SOLUTION INTRAVENOUS CONTINUOUS
Status: DISCONTINUED | OUTPATIENT
Start: 2025-03-24 | End: 2025-03-24

## 2025-03-24 RX ORDER — LIDOCAINE HYDROCHLORIDE 10 MG/ML
50 INJECTION, SOLUTION EPIDURAL; INFILTRATION; INTRACAUDAL; PERINEURAL ONCE
Status: DISCONTINUED | OUTPATIENT
Start: 2025-03-24 | End: 2025-03-30

## 2025-03-24 RX ORDER — SODIUM CHLORIDE 9 MG/ML
INJECTION, SOLUTION INTRAVENOUS PRN
Status: DISCONTINUED | OUTPATIENT
Start: 2025-03-24 | End: 2025-04-06 | Stop reason: HOSPADM

## 2025-03-24 RX ORDER — VENLAFAXINE 37.5 MG/1
37.5 TABLET ORAL 2 TIMES DAILY
Status: DISCONTINUED | OUTPATIENT
Start: 2025-03-24 | End: 2025-04-06 | Stop reason: HOSPADM

## 2025-03-24 RX ORDER — SODIUM CHLORIDE 0.9 % (FLUSH) 0.9 %
5-40 SYRINGE (ML) INJECTION EVERY 12 HOURS SCHEDULED
Status: DISCONTINUED | OUTPATIENT
Start: 2025-03-24 | End: 2025-04-06 | Stop reason: HOSPADM

## 2025-03-24 RX ORDER — FLUCONAZOLE 100 MG/1
100 TABLET ORAL DAILY
Status: ON HOLD | COMMUNITY
End: 2025-04-06 | Stop reason: HOSPADM

## 2025-03-24 RX ORDER — LINEZOLID 2 MG/ML
600 INJECTION, SOLUTION INTRAVENOUS EVERY 12 HOURS
Status: COMPLETED | OUTPATIENT
Start: 2025-03-24 | End: 2025-03-29

## 2025-03-24 RX ORDER — SODIUM CHLORIDE 9 MG/ML
INJECTION, SOLUTION INTRAVENOUS CONTINUOUS
Status: DISCONTINUED | OUTPATIENT
Start: 2025-03-24 | End: 2025-03-24

## 2025-03-24 RX ADMIN — VENLAFAXINE 37.5 MG: 37.5 TABLET ORAL at 20:42

## 2025-03-24 RX ADMIN — VALPROATE SODIUM 65 MG: 100 INJECTION, SOLUTION INTRAVENOUS at 03:54

## 2025-03-24 RX ADMIN — DEXTROSE MONOHYDRATE 125 ML: 100 INJECTION, SOLUTION INTRAVENOUS at 17:34

## 2025-03-24 RX ADMIN — HEPARIN SODIUM 1100 UNITS: 1000 INJECTION, SOLUTION INTRAVENOUS; SUBCUTANEOUS at 18:23

## 2025-03-24 RX ADMIN — TAMSULOSIN HYDROCHLORIDE 0.4 MG: 0.4 CAPSULE ORAL at 20:42

## 2025-03-24 RX ADMIN — GLUCAGON 1 MG: KIT at 17:01

## 2025-03-24 RX ADMIN — VALPROATE SODIUM 65 MG: 100 INJECTION, SOLUTION INTRAVENOUS at 09:39

## 2025-03-24 RX ADMIN — HEPARIN SODIUM 1400 UNITS: 1000 INJECTION INTRAVENOUS; SUBCUTANEOUS at 16:14

## 2025-03-24 RX ADMIN — PIPERACILLIN AND TAZOBACTAM 3375 MG: 3; .375 INJECTION, POWDER, LYOPHILIZED, FOR SOLUTION INTRAVENOUS at 20:33

## 2025-03-24 RX ADMIN — VALPROATE SODIUM 65 MG: 100 INJECTION, SOLUTION INTRAVENOUS at 22:14

## 2025-03-24 RX ADMIN — DEXTROSE: 5 SOLUTION INTRAVENOUS at 08:07

## 2025-03-24 RX ADMIN — OXYCODONE HYDROCHLORIDE AND ACETAMINOPHEN 1 TABLET: 5; 325 TABLET ORAL at 23:10

## 2025-03-24 RX ADMIN — HYDROXYZINE HYDROCHLORIDE 50 MG: 50 TABLET, FILM COATED ORAL at 23:10

## 2025-03-24 RX ADMIN — HEPARIN SODIUM 5000 UNITS: 5000 INJECTION INTRAVENOUS; SUBCUTANEOUS at 05:00

## 2025-03-24 RX ADMIN — SODIUM CHLORIDE 500 ML: 0.9 INJECTION, SOLUTION INTRAVENOUS at 14:00

## 2025-03-24 RX ADMIN — SODIUM CHLORIDE 500 ML: 9 INJECTION, SOLUTION INTRAVENOUS at 11:34

## 2025-03-24 RX ADMIN — LINEZOLID 600 MG: 600 INJECTION, SOLUTION INTRAVENOUS at 20:20

## 2025-03-24 RX ADMIN — HEPARIN SODIUM 1400 UNITS: 1000 INJECTION, SOLUTION INTRAVENOUS; SUBCUTANEOUS at 18:23

## 2025-03-24 RX ADMIN — OXYCODONE HYDROCHLORIDE AND ACETAMINOPHEN 1 TABLET: 5; 325 TABLET ORAL at 04:56

## 2025-03-24 RX ADMIN — HEPARIN SODIUM 1100 UNITS: 1000 INJECTION INTRAVENOUS; SUBCUTANEOUS at 16:14

## 2025-03-24 RX ADMIN — MIRTAZAPINE 7.5 MG: 15 TABLET, FILM COATED ORAL at 20:42

## 2025-03-24 RX ADMIN — MIDODRINE HYDROCHLORIDE 10 MG: 10 TABLET ORAL at 18:07

## 2025-03-24 RX ADMIN — SODIUM CHLORIDE: 9 INJECTION, SOLUTION INTRAVENOUS at 15:27

## 2025-03-24 RX ADMIN — MIDODRINE HYDROCHLORIDE 5 MG: 5 TABLET ORAL at 12:02

## 2025-03-24 RX ADMIN — HEPARIN SODIUM 5000 UNITS: 5000 INJECTION INTRAVENOUS; SUBCUTANEOUS at 22:14

## 2025-03-24 RX ADMIN — VENLAFAXINE 37.5 MG: 37.5 TABLET ORAL at 09:35

## 2025-03-24 ASSESSMENT — PAIN SCALES - WONG BAKER
WONGBAKER_NUMERICALRESPONSE: HURTS WHOLE LOT
WONGBAKER_NUMERICALRESPONSE: HURTS A LITTLE BIT

## 2025-03-24 ASSESSMENT — PAIN DESCRIPTION - LOCATION: LOCATION: GENERALIZED

## 2025-03-24 ASSESSMENT — PAIN DESCRIPTION - DESCRIPTORS: DESCRIPTORS: ACHING

## 2025-03-24 ASSESSMENT — PAIN - FUNCTIONAL ASSESSMENT: PAIN_FUNCTIONAL_ASSESSMENT: PREVENTS OR INTERFERES WITH MANY ACTIVE NOT PASSIVE ACTIVITIES

## 2025-03-24 NOTE — TELEPHONE ENCOUNTER
Writer called Cristobal [phone] and left a voicemail on nurse line letting patient know that he has a follow up appointment for 4/3/25 @ 1150am with Dr. Hoffmann

## 2025-03-24 NOTE — DISCHARGE INSTR - COC
treatment of the diagnosis listed and that he requires Hospice for less 30 days.     Update Admission H&P: No change in H&P    PHYSICIAN SIGNATURE:  Electronically signed by Ryan Deluna MD on 4/6/25 at 11:15 AM EDT

## 2025-03-24 NOTE — ACP (ADVANCE CARE PLANNING)
..Advance Care Planning      Palliative Medicine Provider (MD/NP)  Advance Care Planning (ACP) Conversation      Date of Conversation: 25  The patient and/or authorized decision maker consented to a voluntary Advance Care Planning conversation.   Individuals present for the conversation:   Niece Yari  and previous POA Marianne    Legal Healthcare Agent(s):    Primary Decision Maker: Yari Briceño - Niece/Nephew - [phone]    Primary Decision Maker: TazOsvaldo - Niece/Nephew - [phone]    ACP documents available in EMR prior to discussion:  -Power of  for Healthcare  -Living Will  -Portable DNR    Primary Palliative Diagnosis(es):  Dementia    Conversation Summary: I discussed decision maker with both parties above. Marianne is POA #1 since wife is . She has elected to waive her rights. Patient is , no children, no living siblings, and Osvaldo and Yari are niece/nephew on Eds side of the family. It sounds like there are more but only names I was given. [place] law looks to majority of nieces/nephews and we have 2 on contact list so it would be the two of them together.       Resuscitation Status:    Code Status: DNR-CCA    Outcomes / Completed Documentation:  An explanation of advance directives and their importance was provided and the following forms completed:    -No new documents completed.    If new document completed, original was provided to patient and/or family member.    Copy was placed for scanning into the [facility] EMR.      I spent 8 minutes providing separately identifiable ACP services with the patient and/or surrogate decision maker in a voluntary, in-person conversation discussing the patient's wishes and goals as detailed in the above note.       MARYCARMEN ALONSO, APRN - CNP         
..Advance Care Planning     Palliative Team Advance Care Planning (ACP) Conversation    Date of Conversation: 03/24/25    Individuals present for the conversation:  I speak with Marianne Abdi the legal decision maker on the HCPOA. She explains that she did not understand what waiving her rights actually meant. I explain that it would not automatically go to Yari, that it would go to the majority of nieces and nephews per [facility] law. Marianne states that she understands and she will continue to execute her rights and work with Yari just the 2 of them to advocate for Ed.      ACP documents on file prior to discussion:  -Power of  for Healthcare  -Living Will    Previously completed document/s not on file: Not discussed.    Healthcare Decision Maker:    Primary Decision Maker: Marianne Abdi - Other - [phone]     Conversation Summary:  The family is deciding about dialysis for the patient. Yari understands that Marianne will remain the patient's legal decision maker per the [facility] document .     Resuscitation Status:   Code Status: DNR-CCA     Documentation Completed:  -No new documents completed.        Yadira Lopez RN        
    Conversation Outcomes:  ACP discussion completed    Follow-up plan:    [] Schedule follow-up conversation to continue planning  [] Referred individual to Provider for additional questions/concerns   [] Advised patient/agent/surrogate to review completed ACP document and update if needed with changes in condition, patient preferences or care setting    [] This note routed to one or more involved healthcare providers                
no

## 2025-03-25 ENCOUNTER — HOSPITAL ENCOUNTER (INPATIENT)
Age: 89
Discharge: HOME | DRG: 682 | End: 2025-03-27
Attending: INTERNAL MEDICINE
Payer: MEDICARE

## 2025-03-25 VITALS — HEIGHT: 70 IN | BODY MASS INDEX: 29.06 KG/M2 | WEIGHT: 203 LBS

## 2025-03-25 PROBLEM — Z87.898 HISTORY OF SEIZURE: Status: ACTIVE | Noted: 2025-03-25

## 2025-03-25 PROBLEM — E87.5 HYPERKALEMIA: Status: ACTIVE | Noted: 2025-03-25

## 2025-03-25 PROBLEM — E16.2 HYPOGLYCEMIA: Status: ACTIVE | Noted: 2025-03-25

## 2025-03-25 PROBLEM — R74.8 ELEVATED LIVER ENZYMES: Status: ACTIVE | Noted: 2025-03-25

## 2025-03-25 PROBLEM — G92.8 TOXIC METABOLIC ENCEPHALOPATHY: Status: ACTIVE | Noted: 2025-03-25

## 2025-03-25 LAB
ALBUMIN SERPL-MCNC: 2.1 G/DL (ref 3.5–5.2)
ALP SERPL-CCNC: 61 U/L (ref 40–129)
ALT SERPL-CCNC: 35 U/L (ref 10–50)
ANION GAP SERPL CALCULATED.3IONS-SCNC: 12 MMOL/L (ref 9–16)
ANION GAP SERPL CALCULATED.3IONS-SCNC: 13 MMOL/L (ref 9–16)
ANION GAP SERPL CALCULATED.3IONS-SCNC: 15 MMOL/L (ref 9–16)
AST SERPL-CCNC: 124 U/L (ref 10–50)
B PARAP IS1001 DNA NPH QL NAA+NON-PROBE: NOT DETECTED
B PERT DNA SPEC QL NAA+PROBE: NOT DETECTED
BASOPHILS # BLD: 0 K/UL (ref 0–0.2)
BASOPHILS NFR BLD: 0 % (ref 0–2)
BILIRUB SERPL-MCNC: 0.3 MG/DL (ref 0–1.2)
BUN SERPL-MCNC: 53 MG/DL (ref 8–23)
BUN SERPL-MCNC: 54 MG/DL (ref 8–23)
BUN SERPL-MCNC: 54 MG/DL (ref 8–23)
C PNEUM DNA NPH QL NAA+NON-PROBE: NOT DETECTED
CALCIUM SERPL-MCNC: 7.2 MG/DL (ref 8.6–10.4)
CALCIUM SERPL-MCNC: 7.4 MG/DL (ref 8.6–10.4)
CALCIUM SERPL-MCNC: 7.6 MG/DL (ref 8.6–10.4)
CHLORIDE SERPL-SCNC: 104 MMOL/L (ref 98–107)
CHLORIDE SERPL-SCNC: 104 MMOL/L (ref 98–107)
CHLORIDE SERPL-SCNC: 108 MMOL/L (ref 98–107)
CK SERPL-CCNC: 1859 U/L (ref 39–308)
CO2 SERPL-SCNC: 22 MMOL/L (ref 20–31)
CO2 SERPL-SCNC: 22 MMOL/L (ref 20–31)
CO2 SERPL-SCNC: 23 MMOL/L (ref 20–31)
CORTIS SERPL-MCNC: 13 UG/DL (ref 2.5–19.5)
CORTISOL COLLECTION INFO: NORMAL
CREAT SERPL-MCNC: 3 MG/DL (ref 0.7–1.2)
CREAT SERPL-MCNC: 3.2 MG/DL (ref 0.7–1.2)
CREAT SERPL-MCNC: 3.3 MG/DL (ref 0.7–1.2)
ECHO AO ROOT DIAM: 3.4 CM
ECHO AO ROOT INDEX: 1.62 CM/M2
ECHO AV AREA PEAK VELOCITY: 1.3 CM2
ECHO AV AREA VTI: 1.5 CM2
ECHO AV AREA/BSA PEAK VELOCITY: 0.6 CM2/M2
ECHO AV AREA/BSA VTI: 0.7 CM2/M2
ECHO AV MEAN GRADIENT: 13 MMHG
ECHO AV MEAN VELOCITY: 1.7 M/S
ECHO AV PEAK GRADIENT: 24 MMHG
ECHO AV PEAK VELOCITY: 2.5 M/S
ECHO AV VELOCITY RATIO: 0.4
ECHO AV VTI: 46.1 CM
ECHO BSA: 2.13 M2
ECHO EST RA PRESSURE: 3 MMHG
ECHO LA AREA 2C: 21.4 CM2
ECHO LA AREA 4C: 30.4 CM2
ECHO LA DIAMETER INDEX: 2.14 CM/M2
ECHO LA DIAMETER: 4.5 CM
ECHO LA MAJOR AXIS: 7.8 CM
ECHO LA MINOR AXIS: 6.3 CM
ECHO LA TO AORTIC ROOT RATIO: 1.32
ECHO LA VOL BP: 86 ML (ref 18–58)
ECHO LA VOL MOD A2C: 60 ML (ref 18–58)
ECHO LA VOL MOD A4C: 99 ML (ref 18–58)
ECHO LA VOL/BSA BIPLANE: 41 ML/M2 (ref 16–34)
ECHO LA VOLUME INDEX MOD A2C: 29 ML/M2 (ref 16–34)
ECHO LA VOLUME INDEX MOD A4C: 47 ML/M2 (ref 16–34)
ECHO LV E' LATERAL VELOCITY: 6.2 CM/S
ECHO LV E' SEPTAL VELOCITY: 4.68 CM/S
ECHO LV EF PHYSICIAN: 55 %
ECHO LV FRACTIONAL SHORTENING: 31 % (ref 28–44)
ECHO LV INTERNAL DIMENSION DIASTOLE INDEX: 1.86 CM/M2
ECHO LV INTERNAL DIMENSION DIASTOLIC: 3.9 CM (ref 4.2–5.9)
ECHO LV INTERNAL DIMENSION SYSTOLIC INDEX: 1.29 CM/M2
ECHO LV INTERNAL DIMENSION SYSTOLIC: 2.7 CM
ECHO LV IVSD: 1 CM (ref 0.6–1)
ECHO LV MASS 2D: 122.1 G (ref 88–224)
ECHO LV MASS INDEX 2D: 58.2 G/M2 (ref 49–115)
ECHO LV POSTERIOR WALL DIASTOLIC: 1 CM (ref 0.6–1)
ECHO LV RELATIVE WALL THICKNESS RATIO: 0.51
ECHO LVOT AREA: 3.1 CM2
ECHO LVOT AV VTI INDEX: 0.48
ECHO LVOT DIAM: 2 CM
ECHO LVOT MEAN GRADIENT: 2 MMHG
ECHO LVOT PEAK GRADIENT: 4 MMHG
ECHO LVOT PEAK VELOCITY: 1 M/S
ECHO LVOT STROKE VOLUME INDEX: 32.7 ML/M2
ECHO LVOT SV: 68.8 ML
ECHO LVOT VTI: 21.9 CM
ECHO MV A VELOCITY: 1.42 M/S
ECHO MV AREA VTI: 1.5 CM2
ECHO MV E DECELERATION TIME (DT): 201 MS
ECHO MV E VELOCITY: 0.95 M/S
ECHO MV E/A RATIO: 0.67
ECHO MV E/E' LATERAL: 15.32
ECHO MV E/E' RATIO (AVERAGED): 17.81
ECHO MV E/E' SEPTAL: 20.3
ECHO MV LVOT VTI INDEX: 2.03
ECHO MV MAX VELOCITY: 1.5 M/S
ECHO MV MEAN GRADIENT: 4 MMHG
ECHO MV MEAN VELOCITY: 0.9 M/S
ECHO MV PEAK GRADIENT: 9 MMHG
ECHO MV VTI: 44.5 CM
ECHO RA AREA 4C: 19.6 CM2
ECHO RA END SYSTOLIC VOLUME APICAL 4 CHAMBER INDEX BSA: 27 ML/M2
ECHO RA VOLUME: 56 ML
ECHO RIGHT VENTRICULAR SYSTOLIC PRESSURE (RVSP): 38 MMHG
ECHO RV BASAL DIMENSION: 5 CM
ECHO RV TAPSE: 1.7 CM (ref 1.7–?)
ECHO TV REGURGITANT MAX VELOCITY: 2.94 M/S
ECHO TV REGURGITANT PEAK GRADIENT: 34 MMHG
EOSINOPHIL # BLD: 0.1 K/UL (ref 0–0.4)
EOSINOPHILS RELATIVE PERCENT: 0 % (ref 0–4)
ERYTHROCYTE [DISTWIDTH] IN BLOOD BY AUTOMATED COUNT: 15 % (ref 11.5–14.9)
FLUAV RNA NPH QL NAA+NON-PROBE: NOT DETECTED
FLUBV RNA NPH QL NAA+NON-PROBE: NOT DETECTED
GFR, ESTIMATED: 17 ML/MIN/1.73M2
GFR, ESTIMATED: 18 ML/MIN/1.73M2
GFR, ESTIMATED: 19 ML/MIN/1.73M2
GLUCOSE BLD-MCNC: 100 MG/DL (ref 75–110)
GLUCOSE BLD-MCNC: 116 MG/DL (ref 75–110)
GLUCOSE BLD-MCNC: 123 MG/DL (ref 75–110)
GLUCOSE BLD-MCNC: 126 MG/DL (ref 75–110)
GLUCOSE BLD-MCNC: 49 MG/DL (ref 75–110)
GLUCOSE BLD-MCNC: 62 MG/DL (ref 75–110)
GLUCOSE BLD-MCNC: 68 MG/DL (ref 75–110)
GLUCOSE BLD-MCNC: 71 MG/DL (ref 75–110)
GLUCOSE BLD-MCNC: 80 MG/DL (ref 75–110)
GLUCOSE BLD-MCNC: 90 MG/DL (ref 75–110)
GLUCOSE SERPL-MCNC: 134 MG/DL (ref 74–99)
GLUCOSE SERPL-MCNC: 76 MG/DL (ref 74–99)
GLUCOSE SERPL-MCNC: 80 MG/DL (ref 74–99)
HADV DNA NPH QL NAA+NON-PROBE: NOT DETECTED
HCOV 229E RNA NPH QL NAA+NON-PROBE: NOT DETECTED
HCOV HKU1 RNA NPH QL NAA+NON-PROBE: NOT DETECTED
HCOV NL63 RNA NPH QL NAA+NON-PROBE: NOT DETECTED
HCOV OC43 RNA NPH QL NAA+NON-PROBE: NOT DETECTED
HCT VFR BLD AUTO: 32.2 % (ref 41–53)
HGB BLD-MCNC: 10.2 G/DL (ref 13.5–17.5)
HMPV RNA NPH QL NAA+NON-PROBE: NOT DETECTED
HPIV1 RNA NPH QL NAA+NON-PROBE: NOT DETECTED
HPIV2 RNA NPH QL NAA+NON-PROBE: NOT DETECTED
HPIV3 RNA NPH QL NAA+NON-PROBE: NOT DETECTED
HPIV4 RNA NPH QL NAA+NON-PROBE: NOT DETECTED
LACTATE BLDV-SCNC: 0.8 MMOL/L (ref 0.5–2.2)
LACTATE BLDV-SCNC: 1.8 MMOL/L (ref 0.5–2.2)
LACTATE BLDV-SCNC: 2.1 MMOL/L (ref 0.5–2.2)
LACTATE BLDV-SCNC: 4.2 MMOL/L (ref 0.5–2.2)
LYMPHOCYTES NFR BLD: 1.6 K/UL (ref 1–4.8)
LYMPHOCYTES RELATIVE PERCENT: 12 % (ref 24–44)
M PNEUMO DNA NPH QL NAA+NON-PROBE: NOT DETECTED
MCH RBC QN AUTO: 30.2 PG (ref 26–34)
MCHC RBC AUTO-ENTMCNC: 31.6 G/DL (ref 31–37)
MCV RBC AUTO: 95.6 FL (ref 80–100)
MONOCYTES NFR BLD: 1.3 K/UL (ref 0.1–1.3)
MONOCYTES NFR BLD: 10 % (ref 1–7)
NEUTROPHILS NFR BLD: 78 % (ref 36–66)
NEUTS SEG NFR BLD: 10.9 K/UL (ref 1.3–9.1)
PLATELET # BLD AUTO: 143 K/UL (ref 150–450)
PMV BLD AUTO: 8.7 FL (ref 6–12)
POTASSIUM SERPL-SCNC: 2.8 MMOL/L (ref 3.7–5.3)
POTASSIUM SERPL-SCNC: 2.9 MMOL/L (ref 3.7–5.3)
POTASSIUM SERPL-SCNC: 3 MMOL/L (ref 3.7–5.3)
POTASSIUM SERPL-SCNC: 3.6 MMOL/L (ref 3.7–5.3)
PROT SERPL-MCNC: 5.1 G/DL (ref 6.6–8.7)
RBC # BLD AUTO: 3.37 M/UL (ref 4.5–5.9)
RSV RNA NPH QL NAA+NON-PROBE: NOT DETECTED
RV+EV RNA NPH QL NAA+NON-PROBE: NOT DETECTED
SARS-COV-2 RNA NPH QL NAA+NON-PROBE: NOT DETECTED
SODIUM SERPL-SCNC: 140 MMOL/L (ref 136–145)
SODIUM SERPL-SCNC: 141 MMOL/L (ref 136–145)
SODIUM SERPL-SCNC: 142 MMOL/L (ref 136–145)
SPECIMEN DESCRIPTION: NORMAL
WBC OTHER # BLD: 13.9 K/UL (ref 3.5–11)

## 2025-03-25 PROCEDURE — 2580000003 HC RX 258: Performed by: INTERNAL MEDICINE

## 2025-03-25 PROCEDURE — 80048 BASIC METABOLIC PNL TOTAL CA: CPT

## 2025-03-25 PROCEDURE — 2500000003 HC RX 250 WO HCPCS: Performed by: STUDENT IN AN ORGANIZED HEALTH CARE EDUCATION/TRAINING PROGRAM

## 2025-03-25 PROCEDURE — 99223 1ST HOSP IP/OBS HIGH 75: CPT | Performed by: STUDENT IN AN ORGANIZED HEALTH CARE EDUCATION/TRAINING PROGRAM

## 2025-03-25 PROCEDURE — 6370000000 HC RX 637 (ALT 250 FOR IP)

## 2025-03-25 PROCEDURE — 2500000003 HC RX 250 WO HCPCS: Performed by: INTERNAL MEDICINE

## 2025-03-25 PROCEDURE — 2580000003 HC RX 258: Performed by: STUDENT IN AN ORGANIZED HEALTH CARE EDUCATION/TRAINING PROGRAM

## 2025-03-25 PROCEDURE — 87641 MR-STAPH DNA AMP PROBE: CPT

## 2025-03-25 PROCEDURE — 82533 TOTAL CORTISOL: CPT

## 2025-03-25 PROCEDURE — 36415 COLL VENOUS BLD VENIPUNCTURE: CPT

## 2025-03-25 PROCEDURE — 99291 CRITICAL CARE FIRST HOUR: CPT | Performed by: INTERNAL MEDICINE

## 2025-03-25 PROCEDURE — 6370000000 HC RX 637 (ALT 250 FOR IP): Performed by: INTERNAL MEDICINE

## 2025-03-25 PROCEDURE — 99223 1ST HOSP IP/OBS HIGH 75: CPT | Performed by: NURSE PRACTITIONER

## 2025-03-25 PROCEDURE — 93306 TTE W/DOPPLER COMPLETE: CPT

## 2025-03-25 PROCEDURE — 6360000002 HC RX W HCPCS: Performed by: INTERNAL MEDICINE

## 2025-03-25 PROCEDURE — 6360000002 HC RX W HCPCS

## 2025-03-25 PROCEDURE — 82947 ASSAY GLUCOSE BLOOD QUANT: CPT

## 2025-03-25 PROCEDURE — 4A10X4Z MONITORING OF CENTRAL NERVOUS ELECTRICAL ACTIVITY, EXTERNAL APPROACH: ICD-10-PCS | Performed by: PSYCHIATRY & NEUROLOGY

## 2025-03-25 PROCEDURE — 80053 COMPREHEN METABOLIC PANEL: CPT

## 2025-03-25 PROCEDURE — 2580000003 HC RX 258

## 2025-03-25 PROCEDURE — 84132 ASSAY OF SERUM POTASSIUM: CPT

## 2025-03-25 PROCEDURE — 95816 EEG AWAKE AND DROWSY: CPT | Performed by: PSYCHIATRY & NEUROLOGY

## 2025-03-25 PROCEDURE — 82550 ASSAY OF CK (CPK): CPT

## 2025-03-25 PROCEDURE — 83605 ASSAY OF LACTIC ACID: CPT

## 2025-03-25 PROCEDURE — 02H633Z INSERTION OF INFUSION DEVICE INTO RIGHT ATRIUM, PERCUTANEOUS APPROACH: ICD-10-PCS | Performed by: INTERNAL MEDICINE

## 2025-03-25 PROCEDURE — 93306 TTE W/DOPPLER COMPLETE: CPT | Performed by: INTERNAL MEDICINE

## 2025-03-25 PROCEDURE — 0202U NFCT DS 22 TRGT SARS-COV-2: CPT

## 2025-03-25 PROCEDURE — 95816 EEG AWAKE AND DROWSY: CPT

## 2025-03-25 PROCEDURE — B548ZZA ULTRASONOGRAPHY OF SUPERIOR VENA CAVA, GUIDANCE: ICD-10-PCS | Performed by: INTERNAL MEDICINE

## 2025-03-25 PROCEDURE — 2000000000 HC ICU R&B

## 2025-03-25 PROCEDURE — 85025 COMPLETE CBC W/AUTO DIFF WBC: CPT

## 2025-03-25 RX ORDER — POTASSIUM CHLORIDE 7.45 MG/ML
10 INJECTION INTRAVENOUS
Status: COMPLETED | OUTPATIENT
Start: 2025-03-25 | End: 2025-03-25

## 2025-03-25 RX ORDER — SODIUM CHLORIDE 9 MG/ML
INJECTION, SOLUTION INTRAVENOUS CONTINUOUS
Status: DISCONTINUED | OUTPATIENT
Start: 2025-03-25 | End: 2025-03-27

## 2025-03-25 RX ORDER — POTASSIUM CHLORIDE 1500 MG/1
40 TABLET, EXTENDED RELEASE ORAL PRN
Status: DISCONTINUED | OUTPATIENT
Start: 2025-03-25 | End: 2025-03-25

## 2025-03-25 RX ORDER — 0.9 % SODIUM CHLORIDE 0.9 %
500 INTRAVENOUS SOLUTION INTRAVENOUS ONCE
Status: COMPLETED | OUTPATIENT
Start: 2025-03-25 | End: 2025-03-25

## 2025-03-25 RX ORDER — POTASSIUM CHLORIDE 7.45 MG/ML
10 INJECTION INTRAVENOUS
Status: COMPLETED | OUTPATIENT
Start: 2025-03-25 | End: 2025-03-26

## 2025-03-25 RX ORDER — POTASSIUM CHLORIDE 7.45 MG/ML
10 INJECTION INTRAVENOUS PRN
Status: DISCONTINUED | OUTPATIENT
Start: 2025-03-25 | End: 2025-03-25

## 2025-03-25 RX ORDER — NOREPINEPHRINE BITARTRATE 0.06 MG/ML
1-30 INJECTION, SOLUTION INTRAVENOUS CONTINUOUS
Status: DISCONTINUED | OUTPATIENT
Start: 2025-03-25 | End: 2025-03-25

## 2025-03-25 RX ORDER — POTASSIUM CHLORIDE 7.45 MG/ML
10 INJECTION INTRAVENOUS
Status: DISPENSED | OUTPATIENT
Start: 2025-03-25 | End: 2025-03-25

## 2025-03-25 RX ORDER — 0.9 % SODIUM CHLORIDE 0.9 %
250 INTRAVENOUS SOLUTION INTRAVENOUS ONCE
Status: COMPLETED | OUTPATIENT
Start: 2025-03-25 | End: 2025-03-25

## 2025-03-25 RX ORDER — NOREPINEPHRINE BITARTRATE 0.06 MG/ML
1-30 INJECTION, SOLUTION INTRAVENOUS CONTINUOUS
Status: DISCONTINUED | OUTPATIENT
Start: 2025-03-25 | End: 2025-03-30

## 2025-03-25 RX ADMIN — DEXTROSE MONOHYDRATE 250 ML: 100 INJECTION, SOLUTION INTRAVENOUS at 16:17

## 2025-03-25 RX ADMIN — TAMSULOSIN HYDROCHLORIDE 0.4 MG: 0.4 CAPSULE ORAL at 19:52

## 2025-03-25 RX ADMIN — POTASSIUM CHLORIDE 10 MEQ: 7.46 INJECTION, SOLUTION INTRAVENOUS at 19:29

## 2025-03-25 RX ADMIN — PIPERACILLIN AND TAZOBACTAM 3375 MG: 3; .375 INJECTION, POWDER, LYOPHILIZED, FOR SOLUTION INTRAVENOUS at 10:07

## 2025-03-25 RX ADMIN — SODIUM CHLORIDE 250 ML: 0.9 INJECTION, SOLUTION INTRAVENOUS at 11:05

## 2025-03-25 RX ADMIN — PIPERACILLIN AND TAZOBACTAM 3375 MG: 3; .375 INJECTION, POWDER, LYOPHILIZED, FOR SOLUTION INTRAVENOUS at 19:50

## 2025-03-25 RX ADMIN — SODIUM CHLORIDE 500 ML: 0.9 INJECTION, SOLUTION INTRAVENOUS at 06:43

## 2025-03-25 RX ADMIN — VALPROATE SODIUM 65 MG: 100 INJECTION, SOLUTION INTRAVENOUS at 03:02

## 2025-03-25 RX ADMIN — POTASSIUM CHLORIDE 10 MEQ: 7.46 INJECTION, SOLUTION INTRAVENOUS at 03:56

## 2025-03-25 RX ADMIN — DEXTROSE MONOHYDRATE 125 ML: 100 INJECTION, SOLUTION INTRAVENOUS at 04:29

## 2025-03-25 RX ADMIN — HYDROXYZINE HYDROCHLORIDE 50 MG: 50 TABLET, FILM COATED ORAL at 09:56

## 2025-03-25 RX ADMIN — POTASSIUM CHLORIDE 10 MEQ: 7.46 INJECTION, SOLUTION INTRAVENOUS at 16:03

## 2025-03-25 RX ADMIN — SODIUM CHLORIDE: 0.9 INJECTION, SOLUTION INTRAVENOUS at 09:22

## 2025-03-25 RX ADMIN — LINEZOLID 600 MG: 600 INJECTION, SOLUTION INTRAVENOUS at 18:30

## 2025-03-25 RX ADMIN — MIDODRINE HYDROCHLORIDE 10 MG: 10 TABLET ORAL at 12:32

## 2025-03-25 RX ADMIN — VALPROATE SODIUM 500 MG: 100 INJECTION, SOLUTION INTRAVENOUS at 17:53

## 2025-03-25 RX ADMIN — ACETAMINOPHEN 650 MG: 325 TABLET, FILM COATED ORAL at 09:55

## 2025-03-25 RX ADMIN — LINEZOLID 600 MG: 600 INJECTION, SOLUTION INTRAVENOUS at 05:22

## 2025-03-25 RX ADMIN — POTASSIUM CHLORIDE 10 MEQ: 7.46 INJECTION, SOLUTION INTRAVENOUS at 05:57

## 2025-03-25 RX ADMIN — MIDODRINE HYDROCHLORIDE 10 MG: 10 TABLET ORAL at 16:18

## 2025-03-25 RX ADMIN — MIDODRINE HYDROCHLORIDE 10 MG: 10 TABLET ORAL at 09:57

## 2025-03-25 RX ADMIN — VALPROATE SODIUM 65 MG: 100 INJECTION, SOLUTION INTRAVENOUS at 10:19

## 2025-03-25 RX ADMIN — SODIUM CHLORIDE: 0.9 INJECTION, SOLUTION INTRAVENOUS at 15:47

## 2025-03-25 RX ADMIN — HEPARIN SODIUM 5000 UNITS: 5000 INJECTION INTRAVENOUS; SUBCUTANEOUS at 05:23

## 2025-03-25 RX ADMIN — POTASSIUM CHLORIDE 10 MEQ: 7.46 INJECTION, SOLUTION INTRAVENOUS at 17:03

## 2025-03-25 RX ADMIN — POTASSIUM CHLORIDE 10 MEQ: 7.46 INJECTION, SOLUTION INTRAVENOUS at 22:20

## 2025-03-25 RX ADMIN — POTASSIUM CHLORIDE 10 MEQ: 7.46 INJECTION, SOLUTION INTRAVENOUS at 18:23

## 2025-03-25 RX ADMIN — HEPARIN SODIUM 5000 UNITS: 5000 INJECTION INTRAVENOUS; SUBCUTANEOUS at 21:57

## 2025-03-25 RX ADMIN — Medication 5 MCG/MIN: at 15:36

## 2025-03-25 RX ADMIN — VENLAFAXINE 37.5 MG: 37.5 TABLET ORAL at 10:20

## 2025-03-25 RX ADMIN — DEXTROSE MONOHYDRATE 125 ML: 100 INJECTION, SOLUTION INTRAVENOUS at 00:44

## 2025-03-25 ASSESSMENT — PAIN DESCRIPTION - LOCATION: LOCATION: OTHER (COMMENT)

## 2025-03-25 ASSESSMENT — PAIN SCALES - GENERAL: PAINLEVEL_OUTOF10: 0

## 2025-03-26 ENCOUNTER — APPOINTMENT (OUTPATIENT)
Dept: GENERAL RADIOLOGY | Age: 89
DRG: 682 | End: 2025-03-26
Payer: MEDICARE

## 2025-03-26 LAB
ALBUMIN SERPL-MCNC: 2 G/DL (ref 3.5–5.2)
ALP SERPL-CCNC: 69 U/L (ref 40–129)
ALT SERPL-CCNC: 34 U/L (ref 10–50)
AMMONIA PLAS-SCNC: 43 UMOL/L (ref 16–60)
ANA SER QL IA: NEGATIVE
ANION GAP SERPL CALCULATED.3IONS-SCNC: 12 MMOL/L (ref 9–16)
AST SERPL-CCNC: 74 U/L (ref 10–50)
BASOPHILS # BLD: 0 K/UL (ref 0–0.2)
BASOPHILS NFR BLD: 0 % (ref 0–2)
BILIRUB DIRECT SERPL-MCNC: 0.2 MG/DL (ref 0–0.3)
BILIRUB INDIRECT SERPL-MCNC: 0.1 MG/DL (ref 0–1)
BILIRUB SERPL-MCNC: 0.3 MG/DL (ref 0–1.2)
BUN SERPL-MCNC: 46 MG/DL (ref 8–23)
CALCIUM SERPL-MCNC: 7.3 MG/DL (ref 8.6–10.4)
CHLORIDE SERPL-SCNC: 108 MMOL/L (ref 98–107)
CO2 SERPL-SCNC: 19 MMOL/L (ref 20–31)
CREAT SERPL-MCNC: 2.6 MG/DL (ref 0.7–1.2)
DSDNA IGG SER QL IA: 1.3 IU/ML
EOSINOPHIL # BLD: 0.16 K/UL (ref 0–0.4)
EOSINOPHILS RELATIVE PERCENT: 1 % (ref 0–4)
ERYTHROCYTE [DISTWIDTH] IN BLOOD BY AUTOMATED COUNT: 15 % (ref 11.5–14.9)
GFR, ESTIMATED: 23 ML/MIN/1.73M2
GLUCOSE BLD-MCNC: 111 MG/DL (ref 75–110)
GLUCOSE BLD-MCNC: 112 MG/DL (ref 75–110)
GLUCOSE BLD-MCNC: 126 MG/DL (ref 75–110)
GLUCOSE BLD-MCNC: 136 MG/DL (ref 75–110)
GLUCOSE SERPL-MCNC: 158 MG/DL (ref 74–99)
HCT VFR BLD AUTO: 36 % (ref 41–53)
HGB BLD-MCNC: 11.7 G/DL (ref 13.5–17.5)
LACTATE BLDV-SCNC: 0.9 MMOL/L (ref 0.5–2.2)
LACTATE BLDV-SCNC: 1 MMOL/L (ref 0.5–2.2)
LACTATE BLDV-SCNC: 1.9 MMOL/L (ref 0.5–2.2)
LYMPHOCYTES NFR BLD: 1.59 K/UL (ref 1–4.8)
LYMPHOCYTES RELATIVE PERCENT: 10 % (ref 24–44)
MCH RBC QN AUTO: 30.1 PG (ref 26–34)
MCHC RBC AUTO-ENTMCNC: 32.4 G/DL (ref 31–37)
MCV RBC AUTO: 92.9 FL (ref 80–100)
MONOCYTES NFR BLD: 13 % (ref 1–7)
MONOCYTES NFR BLD: 2.07 K/UL (ref 0.1–1.3)
MORPHOLOGY: ABNORMAL
MRSA, DNA, NASAL: ABNORMAL
NEUTROPHILS NFR BLD: 76 % (ref 36–66)
NEUTS SEG NFR BLD: 12.08 K/UL (ref 1.3–9.1)
NUCLEAR IGG SER IA-RTO: 0.6 U/ML
PLATELET # BLD AUTO: 198 K/UL (ref 150–450)
PMV BLD AUTO: 8.4 FL (ref 6–12)
POTASSIUM SERPL-SCNC: 3.6 MMOL/L (ref 3.7–5.3)
PROT SERPL-MCNC: 4.8 G/DL (ref 6.6–8.7)
RBC # BLD AUTO: 3.88 M/UL (ref 4.5–5.9)
SODIUM SERPL-SCNC: 139 MMOL/L (ref 136–145)
SPECIMEN DESCRIPTION: ABNORMAL
WBC OTHER # BLD: 15.9 K/UL (ref 3.5–11)

## 2025-03-26 PROCEDURE — 6370000000 HC RX 637 (ALT 250 FOR IP): Performed by: INTERNAL MEDICINE

## 2025-03-26 PROCEDURE — 99291 CRITICAL CARE FIRST HOUR: CPT | Performed by: INTERNAL MEDICINE

## 2025-03-26 PROCEDURE — 85025 COMPLETE CBC W/AUTO DIFF WBC: CPT

## 2025-03-26 PROCEDURE — 99232 SBSQ HOSP IP/OBS MODERATE 35: CPT | Performed by: STUDENT IN AN ORGANIZED HEALTH CARE EDUCATION/TRAINING PROGRAM

## 2025-03-26 PROCEDURE — 6370000000 HC RX 637 (ALT 250 FOR IP): Performed by: STUDENT IN AN ORGANIZED HEALTH CARE EDUCATION/TRAINING PROGRAM

## 2025-03-26 PROCEDURE — 71045 X-RAY EXAM CHEST 1 VIEW: CPT

## 2025-03-26 PROCEDURE — 83605 ASSAY OF LACTIC ACID: CPT

## 2025-03-26 PROCEDURE — 6360000002 HC RX W HCPCS: Performed by: INTERNAL MEDICINE

## 2025-03-26 PROCEDURE — 80048 BASIC METABOLIC PNL TOTAL CA: CPT

## 2025-03-26 PROCEDURE — 2580000003 HC RX 258: Performed by: INTERNAL MEDICINE

## 2025-03-26 PROCEDURE — 2000000000 HC ICU R&B

## 2025-03-26 PROCEDURE — 2500000003 HC RX 250 WO HCPCS: Performed by: INTERNAL MEDICINE

## 2025-03-26 PROCEDURE — 82140 ASSAY OF AMMONIA: CPT

## 2025-03-26 PROCEDURE — 82947 ASSAY GLUCOSE BLOOD QUANT: CPT

## 2025-03-26 PROCEDURE — 2580000003 HC RX 258: Performed by: STUDENT IN AN ORGANIZED HEALTH CARE EDUCATION/TRAINING PROGRAM

## 2025-03-26 PROCEDURE — 2500000003 HC RX 250 WO HCPCS: Performed by: STUDENT IN AN ORGANIZED HEALTH CARE EDUCATION/TRAINING PROGRAM

## 2025-03-26 PROCEDURE — 99233 SBSQ HOSP IP/OBS HIGH 50: CPT | Performed by: NURSE PRACTITIONER

## 2025-03-26 PROCEDURE — 80076 HEPATIC FUNCTION PANEL: CPT

## 2025-03-26 PROCEDURE — 6360000002 HC RX W HCPCS

## 2025-03-26 PROCEDURE — 36415 COLL VENOUS BLD VENIPUNCTURE: CPT

## 2025-03-26 PROCEDURE — 99232 SBSQ HOSP IP/OBS MODERATE 35: CPT | Performed by: NURSE PRACTITIONER

## 2025-03-26 PROCEDURE — 6370000000 HC RX 637 (ALT 250 FOR IP)

## 2025-03-26 RX ORDER — LAMOTRIGINE 25 MG/1
25 TABLET ORAL DAILY
Status: DISCONTINUED | OUTPATIENT
Start: 2025-03-26 | End: 2025-04-06 | Stop reason: HOSPADM

## 2025-03-26 RX ORDER — INDOMETHACIN 25 MG/1
50 CAPSULE ORAL ONCE
Status: COMPLETED | OUTPATIENT
Start: 2025-03-26 | End: 2025-03-26

## 2025-03-26 RX ADMIN — SODIUM BICARBONATE 50 MEQ: 84 INJECTION, SOLUTION INTRAVENOUS at 11:35

## 2025-03-26 RX ADMIN — PIPERACILLIN AND TAZOBACTAM 3375 MG: 3; .375 INJECTION, POWDER, LYOPHILIZED, FOR SOLUTION INTRAVENOUS at 09:22

## 2025-03-26 RX ADMIN — VALPROATE SODIUM 500 MG: 100 INJECTION, SOLUTION INTRAVENOUS at 14:52

## 2025-03-26 RX ADMIN — LINEZOLID 600 MG: 600 INJECTION, SOLUTION INTRAVENOUS at 18:29

## 2025-03-26 RX ADMIN — HEPARIN SODIUM 5000 UNITS: 5000 INJECTION INTRAVENOUS; SUBCUTANEOUS at 22:07

## 2025-03-26 RX ADMIN — VENLAFAXINE 37.5 MG: 37.5 TABLET ORAL at 22:07

## 2025-03-26 RX ADMIN — SODIUM CHLORIDE: 0.9 INJECTION, SOLUTION INTRAVENOUS at 14:13

## 2025-03-26 RX ADMIN — POTASSIUM CHLORIDE 10 MEQ: 7.46 INJECTION, SOLUTION INTRAVENOUS at 01:38

## 2025-03-26 RX ADMIN — SODIUM CHLORIDE: 0.9 INJECTION, SOLUTION INTRAVENOUS at 03:02

## 2025-03-26 RX ADMIN — SODIUM CHLORIDE, PRESERVATIVE FREE 10 ML: 5 INJECTION INTRAVENOUS at 22:08

## 2025-03-26 RX ADMIN — MIDODRINE HYDROCHLORIDE 10 MG: 10 TABLET ORAL at 18:26

## 2025-03-26 RX ADMIN — VALPROATE SODIUM 500 MG: 100 INJECTION, SOLUTION INTRAVENOUS at 04:00

## 2025-03-26 RX ADMIN — HEPARIN SODIUM 5000 UNITS: 5000 INJECTION INTRAVENOUS; SUBCUTANEOUS at 14:52

## 2025-03-26 RX ADMIN — Medication 6 MCG/MIN: at 16:55

## 2025-03-26 RX ADMIN — POTASSIUM CHLORIDE 10 MEQ: 7.46 INJECTION, SOLUTION INTRAVENOUS at 05:18

## 2025-03-26 RX ADMIN — HEPARIN SODIUM 5000 UNITS: 5000 INJECTION INTRAVENOUS; SUBCUTANEOUS at 05:37

## 2025-03-26 RX ADMIN — MIDODRINE HYDROCHLORIDE 10 MG: 10 TABLET ORAL at 13:00

## 2025-03-26 RX ADMIN — LINEZOLID 600 MG: 600 INJECTION, SOLUTION INTRAVENOUS at 05:37

## 2025-03-26 RX ADMIN — POTASSIUM CHLORIDE 10 MEQ: 7.46 INJECTION, SOLUTION INTRAVENOUS at 03:59

## 2025-03-26 RX ADMIN — POTASSIUM CHLORIDE 10 MEQ: 7.46 INJECTION, SOLUTION INTRAVENOUS at 02:57

## 2025-03-26 RX ADMIN — PIPERACILLIN AND TAZOBACTAM 3375 MG: 3; .375 INJECTION, POWDER, LYOPHILIZED, FOR SOLUTION INTRAVENOUS at 16:40

## 2025-03-26 RX ADMIN — VENLAFAXINE 37.5 MG: 37.5 TABLET ORAL at 09:20

## 2025-03-26 RX ADMIN — MIRTAZAPINE 7.5 MG: 15 TABLET, FILM COATED ORAL at 22:07

## 2025-03-26 RX ADMIN — MIDODRINE HYDROCHLORIDE 10 MG: 10 TABLET ORAL at 09:19

## 2025-03-26 RX ADMIN — POTASSIUM CHLORIDE 10 MEQ: 7.46 INJECTION, SOLUTION INTRAVENOUS at 00:30

## 2025-03-26 RX ADMIN — LAMOTRIGINE 25 MG: 25 TABLET ORAL at 14:52

## 2025-03-26 ASSESSMENT — PAIN SCALES - WONG BAKER: WONGBAKER_NUMERICALRESPONSE: NO HURT

## 2025-03-26 ASSESSMENT — PAIN SCALES - GENERAL
PAINLEVEL_OUTOF10: 0
PAINLEVEL_OUTOF10: 0

## 2025-03-27 LAB
ALBUMIN SERPL-MCNC: 1.9 G/DL (ref 3.5–5.2)
ALP SERPL-CCNC: 85 U/L (ref 40–129)
ALT SERPL-CCNC: 34 U/L (ref 10–50)
ANION GAP SERPL CALCULATED.3IONS-SCNC: 10 MMOL/L (ref 9–16)
AST SERPL-CCNC: 70 U/L (ref 10–50)
BILIRUB DIRECT SERPL-MCNC: <0.1 MG/DL (ref 0–0.3)
BILIRUB INDIRECT SERPL-MCNC: ABNORMAL MG/DL (ref 0–1)
BILIRUB SERPL-MCNC: <0.2 MG/DL (ref 0–1.2)
BUN SERPL-MCNC: 43 MG/DL (ref 8–23)
CALCIUM SERPL-MCNC: 7.2 MG/DL (ref 8.6–10.4)
CHLORIDE SERPL-SCNC: 112 MMOL/L (ref 98–107)
CO2 SERPL-SCNC: 21 MMOL/L (ref 20–31)
CREAT SERPL-MCNC: 2.2 MG/DL (ref 0.7–1.2)
GFR, ESTIMATED: 28 ML/MIN/1.73M2
GLUCOSE BLD-MCNC: 101 MG/DL (ref 75–110)
GLUCOSE BLD-MCNC: 119 MG/DL (ref 75–110)
GLUCOSE BLD-MCNC: 126 MG/DL (ref 75–110)
GLUCOSE SERPL-MCNC: 119 MG/DL (ref 74–99)
LACTATE BLDV-SCNC: 0.9 MMOL/L (ref 0.5–2.2)
LACTATE BLDV-SCNC: 1.3 MMOL/L (ref 0.5–2.2)
MAGNESIUM SERPL-MCNC: 1.6 MG/DL (ref 1.6–2.4)
MAGNESIUM SERPL-MCNC: 1.9 MG/DL (ref 1.6–2.4)
POTASSIUM SERPL-SCNC: 3.2 MMOL/L (ref 3.7–5.3)
POTASSIUM SERPL-SCNC: 3.4 MMOL/L (ref 3.7–5.3)
POTASSIUM SERPL-SCNC: 3.5 MMOL/L (ref 3.7–5.3)
PROT SERPL-MCNC: 4.6 G/DL (ref 6.6–8.7)
SODIUM SERPL-SCNC: 143 MMOL/L (ref 136–145)

## 2025-03-27 PROCEDURE — 82947 ASSAY GLUCOSE BLOOD QUANT: CPT

## 2025-03-27 PROCEDURE — 2500000003 HC RX 250 WO HCPCS: Performed by: STUDENT IN AN ORGANIZED HEALTH CARE EDUCATION/TRAINING PROGRAM

## 2025-03-27 PROCEDURE — 6360000002 HC RX W HCPCS: Performed by: INTERNAL MEDICINE

## 2025-03-27 PROCEDURE — 2580000003 HC RX 258: Performed by: NURSE PRACTITIONER

## 2025-03-27 PROCEDURE — 6370000000 HC RX 637 (ALT 250 FOR IP): Performed by: INTERNAL MEDICINE

## 2025-03-27 PROCEDURE — 2580000003 HC RX 258: Performed by: INTERNAL MEDICINE

## 2025-03-27 PROCEDURE — 36415 COLL VENOUS BLD VENIPUNCTURE: CPT

## 2025-03-27 PROCEDURE — 99232 SBSQ HOSP IP/OBS MODERATE 35: CPT | Performed by: STUDENT IN AN ORGANIZED HEALTH CARE EDUCATION/TRAINING PROGRAM

## 2025-03-27 PROCEDURE — 83605 ASSAY OF LACTIC ACID: CPT

## 2025-03-27 PROCEDURE — 84132 ASSAY OF SERUM POTASSIUM: CPT

## 2025-03-27 PROCEDURE — 99291 CRITICAL CARE FIRST HOUR: CPT | Performed by: INTERNAL MEDICINE

## 2025-03-27 PROCEDURE — 80048 BASIC METABOLIC PNL TOTAL CA: CPT

## 2025-03-27 PROCEDURE — 2500000003 HC RX 250 WO HCPCS: Performed by: NURSE PRACTITIONER

## 2025-03-27 PROCEDURE — 80076 HEPATIC FUNCTION PANEL: CPT

## 2025-03-27 PROCEDURE — 2580000003 HC RX 258: Performed by: STUDENT IN AN ORGANIZED HEALTH CARE EDUCATION/TRAINING PROGRAM

## 2025-03-27 PROCEDURE — 2500000003 HC RX 250 WO HCPCS: Performed by: INTERNAL MEDICINE

## 2025-03-27 PROCEDURE — 83735 ASSAY OF MAGNESIUM: CPT

## 2025-03-27 PROCEDURE — 2000000000 HC ICU R&B

## 2025-03-27 PROCEDURE — 6360000002 HC RX W HCPCS

## 2025-03-27 PROCEDURE — 6370000000 HC RX 637 (ALT 250 FOR IP)

## 2025-03-27 PROCEDURE — 2500000003 HC RX 250 WO HCPCS

## 2025-03-27 PROCEDURE — 6370000000 HC RX 637 (ALT 250 FOR IP): Performed by: STUDENT IN AN ORGANIZED HEALTH CARE EDUCATION/TRAINING PROGRAM

## 2025-03-27 RX ORDER — DIVALPROEX SODIUM 500 MG/1
500 TABLET, FILM COATED, EXTENDED RELEASE ORAL 2 TIMES DAILY
Status: DISCONTINUED | OUTPATIENT
Start: 2025-03-27 | End: 2025-03-27

## 2025-03-27 RX ORDER — DIVALPROEX SODIUM 500 MG/1
500 TABLET, FILM COATED, EXTENDED RELEASE ORAL 2 TIMES DAILY
Qty: 30 TABLET | Refills: 3 | Status: SHIPPED | OUTPATIENT
Start: 2025-03-27

## 2025-03-27 RX ORDER — POTASSIUM CHLORIDE 7.45 MG/ML
10 INJECTION INTRAVENOUS
Status: COMPLETED | OUTPATIENT
Start: 2025-03-27 | End: 2025-03-27

## 2025-03-27 RX ORDER — SODIUM CHLORIDE 9 MG/ML
INJECTION, SOLUTION INTRAVENOUS
Status: DISPENSED
Start: 2025-03-27 | End: 2025-03-27

## 2025-03-27 RX ORDER — LAMOTRIGINE 25 MG/1
TABLET ORAL
Qty: 322 TABLET | Refills: 0 | Status: SHIPPED | OUTPATIENT
Start: 2025-03-28 | End: 2025-05-23

## 2025-03-27 RX ORDER — MAGNESIUM SULFATE HEPTAHYDRATE 40 MG/ML
2000 INJECTION, SOLUTION INTRAVENOUS ONCE
Status: COMPLETED | OUTPATIENT
Start: 2025-03-27 | End: 2025-03-27

## 2025-03-27 RX ORDER — SODIUM CHLORIDE 450 MG/100ML
INJECTION, SOLUTION INTRAVENOUS CONTINUOUS
Status: DISCONTINUED | OUTPATIENT
Start: 2025-03-27 | End: 2025-03-29

## 2025-03-27 RX ADMIN — LAMOTRIGINE 25 MG: 25 TABLET ORAL at 07:37

## 2025-03-27 RX ADMIN — PIPERACILLIN AND TAZOBACTAM 3375 MG: 3; .375 INJECTION, POWDER, LYOPHILIZED, FOR SOLUTION INTRAVENOUS at 00:34

## 2025-03-27 RX ADMIN — MIDODRINE HYDROCHLORIDE 10 MG: 10 TABLET ORAL at 11:57

## 2025-03-27 RX ADMIN — VALPROATE SODIUM 250 MG: 100 INJECTION, SOLUTION INTRAVENOUS at 21:17

## 2025-03-27 RX ADMIN — POTASSIUM CHLORIDE 10 MEQ: 7.46 INJECTION, SOLUTION INTRAVENOUS at 08:46

## 2025-03-27 RX ADMIN — SODIUM CHLORIDE, PRESERVATIVE FREE 10 ML: 5 INJECTION INTRAVENOUS at 21:07

## 2025-03-27 RX ADMIN — SODIUM CHLORIDE: 0.9 INJECTION, SOLUTION INTRAVENOUS at 01:35

## 2025-03-27 RX ADMIN — VENLAFAXINE 37.5 MG: 37.5 TABLET ORAL at 20:18

## 2025-03-27 RX ADMIN — SODIUM CHLORIDE: 0.45 INJECTION, SOLUTION INTRAVENOUS at 10:11

## 2025-03-27 RX ADMIN — POTASSIUM CHLORIDE 10 MEQ: 7.46 INJECTION, SOLUTION INTRAVENOUS at 07:30

## 2025-03-27 RX ADMIN — POTASSIUM CHLORIDE 10 MEQ: 7.46 INJECTION, SOLUTION INTRAVENOUS at 15:36

## 2025-03-27 RX ADMIN — LINEZOLID 600 MG: 600 INJECTION, SOLUTION INTRAVENOUS at 05:25

## 2025-03-27 RX ADMIN — POTASSIUM CHLORIDE 10 MEQ: 7.46 INJECTION, SOLUTION INTRAVENOUS at 16:57

## 2025-03-27 RX ADMIN — PIPERACILLIN AND TAZOBACTAM 3375 MG: 3; .375 INJECTION, POWDER, LYOPHILIZED, FOR SOLUTION INTRAVENOUS at 08:45

## 2025-03-27 RX ADMIN — POTASSIUM CHLORIDE 10 MEQ: 7.46 INJECTION, SOLUTION INTRAVENOUS at 14:05

## 2025-03-27 RX ADMIN — HEPARIN SODIUM 5000 UNITS: 5000 INJECTION INTRAVENOUS; SUBCUTANEOUS at 20:18

## 2025-03-27 RX ADMIN — VALPROATE SODIUM 500 MG: 100 INJECTION, SOLUTION INTRAVENOUS at 04:21

## 2025-03-27 RX ADMIN — VENLAFAXINE 37.5 MG: 37.5 TABLET ORAL at 07:30

## 2025-03-27 RX ADMIN — MIDODRINE HYDROCHLORIDE 10 MG: 10 TABLET ORAL at 07:30

## 2025-03-27 RX ADMIN — WATER 2.5 MG: 1 INJECTION INTRAMUSCULAR; INTRAVENOUS; SUBCUTANEOUS at 01:57

## 2025-03-27 RX ADMIN — HEPARIN SODIUM 5000 UNITS: 5000 INJECTION INTRAVENOUS; SUBCUTANEOUS at 13:53

## 2025-03-27 RX ADMIN — MAGNESIUM SULFATE HEPTAHYDRATE 2000 MG: 40 INJECTION, SOLUTION INTRAVENOUS at 12:00

## 2025-03-27 RX ADMIN — POTASSIUM CHLORIDE 10 MEQ: 7.46 INJECTION, SOLUTION INTRAVENOUS at 06:28

## 2025-03-27 RX ADMIN — HYDROXYZINE HYDROCHLORIDE 50 MG: 50 TABLET, FILM COATED ORAL at 17:43

## 2025-03-27 RX ADMIN — MIRTAZAPINE 7.5 MG: 15 TABLET, FILM COATED ORAL at 20:17

## 2025-03-27 RX ADMIN — SODIUM CHLORIDE: 0.45 INJECTION, SOLUTION INTRAVENOUS at 19:12

## 2025-03-27 RX ADMIN — HEPARIN SODIUM 5000 UNITS: 5000 INJECTION INTRAVENOUS; SUBCUTANEOUS at 05:29

## 2025-03-27 RX ADMIN — LINEZOLID 600 MG: 600 INJECTION, SOLUTION INTRAVENOUS at 18:01

## 2025-03-27 RX ADMIN — POTASSIUM CHLORIDE 10 MEQ: 7.46 INJECTION, SOLUTION INTRAVENOUS at 05:26

## 2025-03-27 RX ADMIN — MIDODRINE HYDROCHLORIDE 10 MG: 10 TABLET ORAL at 16:54

## 2025-03-27 RX ADMIN — PIPERACILLIN AND TAZOBACTAM 3375 MG: 3; .375 INJECTION, POWDER, LYOPHILIZED, FOR SOLUTION INTRAVENOUS at 16:56

## 2025-03-27 ASSESSMENT — PAIN SCALES - GENERAL
PAINLEVEL_OUTOF10: 0

## 2025-03-28 ENCOUNTER — APPOINTMENT (OUTPATIENT)
Dept: GENERAL RADIOLOGY | Age: 89
DRG: 682 | End: 2025-03-28
Payer: MEDICARE

## 2025-03-28 PROBLEM — K52.89 STERCORAL COLITIS: Status: ACTIVE | Noted: 2025-03-28

## 2025-03-28 PROBLEM — K59.00 CONSTIPATION: Status: ACTIVE | Noted: 2025-03-28

## 2025-03-28 LAB
ABSOLUTE BANDS: 0.1 K/UL (ref 0–1)
ANION GAP SERPL CALCULATED.3IONS-SCNC: 7 MMOL/L (ref 9–16)
BANDS: 1 % (ref 0–10)
BASOPHILS # BLD: 0 K/UL (ref 0–0.2)
BASOPHILS NFR BLD: 0 % (ref 0–2)
BUN SERPL-MCNC: 38 MG/DL (ref 8–23)
CALCIUM SERPL-MCNC: 7.4 MG/DL (ref 8.6–10.4)
CHLORIDE SERPL-SCNC: 111 MMOL/L (ref 98–107)
CO2 SERPL-SCNC: 23 MMOL/L (ref 20–31)
CREAT SERPL-MCNC: 1.7 MG/DL (ref 0.7–1.2)
EOSINOPHIL # BLD: 0.21 K/UL (ref 0–0.4)
EOSINOPHILS RELATIVE PERCENT: 2 % (ref 0–4)
ERYTHROCYTE [DISTWIDTH] IN BLOOD BY AUTOMATED COUNT: 15.6 % (ref 11.5–14.9)
FERRITIN SERPL-MCNC: 860 NG/ML
GFR, ESTIMATED: 38 ML/MIN/1.73M2
GLUCOSE BLD-MCNC: 119 MG/DL (ref 75–110)
GLUCOSE BLD-MCNC: 33 MG/DL (ref 75–110)
GLUCOSE BLD-MCNC: 66 MG/DL (ref 75–110)
GLUCOSE BLD-MCNC: 96 MG/DL (ref 75–110)
GLUCOSE SERPL-MCNC: 125 MG/DL (ref 74–99)
HCT VFR BLD AUTO: 32.7 % (ref 41–53)
HGB BLD-MCNC: 10.5 G/DL (ref 13.5–17.5)
IRON SATN MFR SERPL: 38 % (ref 20–55)
IRON SERPL-MCNC: 38 UG/DL (ref 61–157)
LYMPHOCYTES NFR BLD: 2.08 K/UL (ref 1–4.8)
LYMPHOCYTES RELATIVE PERCENT: 20 % (ref 24–44)
MAGNESIUM SERPL-MCNC: 1.9 MG/DL (ref 1.6–2.4)
MCH RBC QN AUTO: 30.3 PG (ref 26–34)
MCHC RBC AUTO-ENTMCNC: 32 G/DL (ref 31–37)
MCV RBC AUTO: 94.7 FL (ref 80–100)
MONOCYTES NFR BLD: 0.83 K/UL (ref 0.1–1.3)
MONOCYTES NFR BLD: 8 % (ref 1–7)
MORPHOLOGY: ABNORMAL
MYELOCYTES ABSOLUTE COUNT: 0.21 K/UL
MYELOCYTES: 2 %
NEUTROPHILS NFR BLD: 67 % (ref 36–66)
NEUTS SEG NFR BLD: 6.97 K/UL (ref 1.3–9.1)
PLATELET # BLD AUTO: 137 K/UL (ref 150–450)
PMV BLD AUTO: 8.6 FL (ref 6–12)
POTASSIUM SERPL-SCNC: 3.1 MMOL/L (ref 3.7–5.3)
POTASSIUM SERPL-SCNC: 4 MMOL/L (ref 3.7–5.3)
RBC # BLD AUTO: 3.45 M/UL (ref 4.5–5.9)
SODIUM SERPL-SCNC: 141 MMOL/L (ref 136–145)
TIBC SERPL-MCNC: 100 UG/DL (ref 250–450)
UNSATURATED IRON BINDING CAPACITY: 62 UG/DL (ref 112–347)
WBC OTHER # BLD: 10.4 K/UL (ref 3.5–11)

## 2025-03-28 PROCEDURE — 82947 ASSAY GLUCOSE BLOOD QUANT: CPT

## 2025-03-28 PROCEDURE — 2500000003 HC RX 250 WO HCPCS: Performed by: INTERNAL MEDICINE

## 2025-03-28 PROCEDURE — 2000000000 HC ICU R&B

## 2025-03-28 PROCEDURE — 6360000002 HC RX W HCPCS

## 2025-03-28 PROCEDURE — 2580000003 HC RX 258: Performed by: INTERNAL MEDICINE

## 2025-03-28 PROCEDURE — 36415 COLL VENOUS BLD VENIPUNCTURE: CPT

## 2025-03-28 PROCEDURE — 80048 BASIC METABOLIC PNL TOTAL CA: CPT

## 2025-03-28 PROCEDURE — 83540 ASSAY OF IRON: CPT

## 2025-03-28 PROCEDURE — 6370000000 HC RX 637 (ALT 250 FOR IP): Performed by: INTERNAL MEDICINE

## 2025-03-28 PROCEDURE — 2500000003 HC RX 250 WO HCPCS: Performed by: NURSE PRACTITIONER

## 2025-03-28 PROCEDURE — 99223 1ST HOSP IP/OBS HIGH 75: CPT | Performed by: INTERNAL MEDICINE

## 2025-03-28 PROCEDURE — 83735 ASSAY OF MAGNESIUM: CPT

## 2025-03-28 PROCEDURE — 85025 COMPLETE CBC W/AUTO DIFF WBC: CPT

## 2025-03-28 PROCEDURE — 74018 RADEX ABDOMEN 1 VIEW: CPT

## 2025-03-28 PROCEDURE — 6370000000 HC RX 637 (ALT 250 FOR IP): Performed by: STUDENT IN AN ORGANIZED HEALTH CARE EDUCATION/TRAINING PROGRAM

## 2025-03-28 PROCEDURE — 99233 SBSQ HOSP IP/OBS HIGH 50: CPT | Performed by: NURSE PRACTITIONER

## 2025-03-28 PROCEDURE — 6360000002 HC RX W HCPCS: Performed by: INTERNAL MEDICINE

## 2025-03-28 PROCEDURE — 82728 ASSAY OF FERRITIN: CPT

## 2025-03-28 PROCEDURE — 2580000003 HC RX 258: Performed by: NURSE PRACTITIONER

## 2025-03-28 PROCEDURE — APPNB30 APP NON BILLABLE TIME 0-30 MINS: Performed by: NURSE PRACTITIONER

## 2025-03-28 PROCEDURE — 99233 SBSQ HOSP IP/OBS HIGH 50: CPT

## 2025-03-28 PROCEDURE — 84132 ASSAY OF SERUM POTASSIUM: CPT

## 2025-03-28 PROCEDURE — 83550 IRON BINDING TEST: CPT

## 2025-03-28 PROCEDURE — 2580000003 HC RX 258

## 2025-03-28 PROCEDURE — 6370000000 HC RX 637 (ALT 250 FOR IP)

## 2025-03-28 RX ORDER — POTASSIUM CHLORIDE 7.45 MG/ML
10 INJECTION INTRAVENOUS
Status: COMPLETED | OUTPATIENT
Start: 2025-03-28 | End: 2025-03-28

## 2025-03-28 RX ADMIN — VENLAFAXINE 37.5 MG: 37.5 TABLET ORAL at 09:55

## 2025-03-28 RX ADMIN — LINEZOLID 600 MG: 600 INJECTION, SOLUTION INTRAVENOUS at 05:50

## 2025-03-28 RX ADMIN — MIDODRINE HYDROCHLORIDE 10 MG: 10 TABLET ORAL at 21:05

## 2025-03-28 RX ADMIN — POTASSIUM CHLORIDE 10 MEQ: 7.46 INJECTION, SOLUTION INTRAVENOUS at 05:49

## 2025-03-28 RX ADMIN — POTASSIUM CHLORIDE 10 MEQ: 7.46 INJECTION, SOLUTION INTRAVENOUS at 08:24

## 2025-03-28 RX ADMIN — SODIUM CHLORIDE, PRESERVATIVE FREE 10 ML: 5 INJECTION INTRAVENOUS at 21:05

## 2025-03-28 RX ADMIN — HEPARIN SODIUM 5000 UNITS: 5000 INJECTION INTRAVENOUS; SUBCUTANEOUS at 05:48

## 2025-03-28 RX ADMIN — MIDODRINE HYDROCHLORIDE 10 MG: 10 TABLET ORAL at 14:20

## 2025-03-28 RX ADMIN — POTASSIUM CHLORIDE 10 MEQ: 7.46 INJECTION, SOLUTION INTRAVENOUS at 09:24

## 2025-03-28 RX ADMIN — SODIUM CHLORIDE: 0.45 INJECTION, SOLUTION INTRAVENOUS at 04:57

## 2025-03-28 RX ADMIN — TAMSULOSIN HYDROCHLORIDE 0.4 MG: 0.4 CAPSULE ORAL at 21:05

## 2025-03-28 RX ADMIN — HEPARIN SODIUM 5000 UNITS: 5000 INJECTION INTRAVENOUS; SUBCUTANEOUS at 21:32

## 2025-03-28 RX ADMIN — PIPERACILLIN AND TAZOBACTAM 3375 MG: 3; .375 INJECTION, POWDER, LYOPHILIZED, FOR SOLUTION INTRAVENOUS at 20:56

## 2025-03-28 RX ADMIN — MIDODRINE HYDROCHLORIDE 10 MG: 10 TABLET ORAL at 09:55

## 2025-03-28 RX ADMIN — DEXTROSE MONOHYDRATE 250 ML: 100 INJECTION, SOLUTION INTRAVENOUS at 18:30

## 2025-03-28 RX ADMIN — HEPARIN SODIUM 5000 UNITS: 5000 INJECTION INTRAVENOUS; SUBCUTANEOUS at 14:19

## 2025-03-28 RX ADMIN — LAMOTRIGINE 25 MG: 25 TABLET ORAL at 09:55

## 2025-03-28 RX ADMIN — LINEZOLID 600 MG: 600 INJECTION, SOLUTION INTRAVENOUS at 18:32

## 2025-03-28 RX ADMIN — VALPROATE SODIUM 250 MG: 100 INJECTION, SOLUTION INTRAVENOUS at 21:07

## 2025-03-28 RX ADMIN — VENLAFAXINE 37.5 MG: 37.5 TABLET ORAL at 21:06

## 2025-03-28 RX ADMIN — VALPROATE SODIUM 250 MG: 100 INJECTION, SOLUTION INTRAVENOUS at 03:04

## 2025-03-28 RX ADMIN — POTASSIUM CHLORIDE 10 MEQ: 7.46 INJECTION, SOLUTION INTRAVENOUS at 07:04

## 2025-03-28 RX ADMIN — VALPROATE SODIUM 250 MG: 100 INJECTION, SOLUTION INTRAVENOUS at 08:25

## 2025-03-28 RX ADMIN — VALPROATE SODIUM 250 MG: 100 INJECTION, SOLUTION INTRAVENOUS at 16:33

## 2025-03-28 RX ADMIN — POTASSIUM CHLORIDE 10 MEQ: 7.46 INJECTION, SOLUTION INTRAVENOUS at 10:38

## 2025-03-28 RX ADMIN — PIPERACILLIN AND TAZOBACTAM 3375 MG: 3; .375 INJECTION, POWDER, LYOPHILIZED, FOR SOLUTION INTRAVENOUS at 00:14

## 2025-03-28 RX ADMIN — MIRTAZAPINE 7.5 MG: 15 TABLET, FILM COATED ORAL at 21:04

## 2025-03-28 ASSESSMENT — PAIN SCALES - WONG BAKER
WONGBAKER_NUMERICALRESPONSE: NO HURT

## 2025-03-29 ENCOUNTER — APPOINTMENT (OUTPATIENT)
Dept: GENERAL RADIOLOGY | Age: 89
DRG: 682 | End: 2025-03-29
Payer: MEDICARE

## 2025-03-29 LAB
ALBUMIN SERPL-MCNC: 2.2 G/DL (ref 3.5–5.2)
ALP SERPL-CCNC: 112 U/L (ref 40–129)
ALT SERPL-CCNC: 42 U/L (ref 10–50)
ANION GAP SERPL CALCULATED.3IONS-SCNC: 7 MMOL/L (ref 9–16)
AST SERPL-CCNC: 62 U/L (ref 10–50)
BASOPHILS # BLD: 0 K/UL (ref 0–0.2)
BASOPHILS NFR BLD: 0 % (ref 0–2)
BILIRUB DIRECT SERPL-MCNC: <0.1 MG/DL (ref 0–0.3)
BILIRUB INDIRECT SERPL-MCNC: ABNORMAL MG/DL (ref 0–1)
BILIRUB SERPL-MCNC: <0.2 MG/DL (ref 0–1.2)
BUN SERPL-MCNC: 31 MG/DL (ref 8–23)
CALCIUM SERPL-MCNC: 7.7 MG/DL (ref 8.6–10.4)
CHLORIDE SERPL-SCNC: 110 MMOL/L (ref 98–107)
CO2 SERPL-SCNC: 23 MMOL/L (ref 20–31)
CORTIS SERPL-MCNC: 12.3 UG/DL (ref 2.5–19.5)
CORTISOL COLLECTION INFO: NORMAL
CREAT SERPL-MCNC: 1.4 MG/DL (ref 0.7–1.2)
EOSINOPHIL # BLD: 0.55 K/UL (ref 0–0.4)
EOSINOPHILS RELATIVE PERCENT: 6 % (ref 0–4)
ERYTHROCYTE [DISTWIDTH] IN BLOOD BY AUTOMATED COUNT: 15.2 % (ref 11.5–14.9)
FOLATE SERPL-MCNC: 13.2 NG/ML (ref 4.8–24.2)
GFR, ESTIMATED: 48 ML/MIN/1.73M2
GLUCOSE BLD-MCNC: 51 MG/DL (ref 75–110)
GLUCOSE BLD-MCNC: 81 MG/DL (ref 75–110)
GLUCOSE BLD-MCNC: 86 MG/DL (ref 75–110)
GLUCOSE SERPL-MCNC: 108 MG/DL (ref 74–99)
GLUCOSE SERPL-MCNC: 93 MG/DL (ref 74–99)
HCT VFR BLD AUTO: 32.9 % (ref 41–53)
HGB BLD-MCNC: 10.7 G/DL (ref 13.5–17.5)
LYMPHOCYTES NFR BLD: 2.85 K/UL (ref 1–4.8)
LYMPHOCYTES RELATIVE PERCENT: 31 % (ref 24–44)
MAGNESIUM SERPL-MCNC: 1.7 MG/DL (ref 1.6–2.4)
MCH RBC QN AUTO: 30.6 PG (ref 26–34)
MCHC RBC AUTO-ENTMCNC: 32.6 G/DL (ref 31–37)
MCV RBC AUTO: 93.9 FL (ref 80–100)
METAMYELOCYTES ABSOLUTE COUNT: 0.28 K/UL
METAMYELOCYTES: 3 %
MICROORGANISM SPEC CULT: NORMAL
MICROORGANISM SPEC CULT: NORMAL
MONOCYTES NFR BLD: 0.74 K/UL (ref 0.1–1.3)
MONOCYTES NFR BLD: 8 % (ref 1–7)
MORPHOLOGY: ABNORMAL
NEUTROPHILS NFR BLD: 52 % (ref 36–66)
NEUTS SEG NFR BLD: 4.78 K/UL (ref 1.3–9.1)
PHOSPHATE SERPL-MCNC: 1.1 MG/DL (ref 2.5–4.5)
PHOSPHATE SERPL-MCNC: 4.2 MG/DL (ref 2.5–4.5)
PLATELET # BLD AUTO: 118 K/UL (ref 150–450)
PMV BLD AUTO: 8.2 FL (ref 6–12)
POTASSIUM SERPL-SCNC: 3.4 MMOL/L (ref 3.7–5.3)
POTASSIUM SERPL-SCNC: 3.8 MMOL/L (ref 3.7–5.3)
PROT SERPL-MCNC: 4.9 G/DL (ref 6.6–8.7)
RBC # BLD AUTO: 3.5 M/UL (ref 4.5–5.9)
SERVICE CMNT-IMP: NORMAL
SERVICE CMNT-IMP: NORMAL
SODIUM SERPL-SCNC: 140 MMOL/L (ref 136–145)
SPECIMEN DESCRIPTION: NORMAL
SPECIMEN DESCRIPTION: NORMAL
VIT B12 SERPL-MCNC: 630 PG/ML (ref 232–1245)
WBC OTHER # BLD: 9.2 K/UL (ref 3.5–11)

## 2025-03-29 PROCEDURE — 2580000003 HC RX 258: Performed by: INTERNAL MEDICINE

## 2025-03-29 PROCEDURE — 84100 ASSAY OF PHOSPHORUS: CPT

## 2025-03-29 PROCEDURE — 2500000003 HC RX 250 WO HCPCS: Performed by: INTERNAL MEDICINE

## 2025-03-29 PROCEDURE — 94761 N-INVAS EAR/PLS OXIMETRY MLT: CPT

## 2025-03-29 PROCEDURE — 83735 ASSAY OF MAGNESIUM: CPT

## 2025-03-29 PROCEDURE — 82746 ASSAY OF FOLIC ACID SERUM: CPT

## 2025-03-29 PROCEDURE — 2580000003 HC RX 258

## 2025-03-29 PROCEDURE — 84132 ASSAY OF SERUM POTASSIUM: CPT

## 2025-03-29 PROCEDURE — 6360000002 HC RX W HCPCS: Performed by: INTERNAL MEDICINE

## 2025-03-29 PROCEDURE — 6370000000 HC RX 637 (ALT 250 FOR IP): Performed by: INTERNAL MEDICINE

## 2025-03-29 PROCEDURE — 80048 BASIC METABOLIC PNL TOTAL CA: CPT

## 2025-03-29 PROCEDURE — 99233 SBSQ HOSP IP/OBS HIGH 50: CPT

## 2025-03-29 PROCEDURE — 6370000000 HC RX 637 (ALT 250 FOR IP)

## 2025-03-29 PROCEDURE — 6360000002 HC RX W HCPCS

## 2025-03-29 PROCEDURE — 36415 COLL VENOUS BLD VENIPUNCTURE: CPT

## 2025-03-29 PROCEDURE — 80076 HEPATIC FUNCTION PANEL: CPT

## 2025-03-29 PROCEDURE — 82947 ASSAY GLUCOSE BLOOD QUANT: CPT

## 2025-03-29 PROCEDURE — 2580000003 HC RX 258: Performed by: NURSE PRACTITIONER

## 2025-03-29 PROCEDURE — APPSS30 APP SPLIT SHARED TIME 16-30 MINUTES: Performed by: NURSE PRACTITIONER

## 2025-03-29 PROCEDURE — 6370000000 HC RX 637 (ALT 250 FOR IP): Performed by: STUDENT IN AN ORGANIZED HEALTH CARE EDUCATION/TRAINING PROGRAM

## 2025-03-29 PROCEDURE — 2500000003 HC RX 250 WO HCPCS: Performed by: NURSE PRACTITIONER

## 2025-03-29 PROCEDURE — 71045 X-RAY EXAM CHEST 1 VIEW: CPT

## 2025-03-29 PROCEDURE — 85025 COMPLETE CBC W/AUTO DIFF WBC: CPT

## 2025-03-29 PROCEDURE — 2000000000 HC ICU R&B

## 2025-03-29 PROCEDURE — 82533 TOTAL CORTISOL: CPT

## 2025-03-29 PROCEDURE — 99231 SBSQ HOSP IP/OBS SF/LOW 25: CPT | Performed by: INTERNAL MEDICINE

## 2025-03-29 PROCEDURE — 82607 VITAMIN B-12: CPT

## 2025-03-29 RX ORDER — POTASSIUM CHLORIDE 7.45 MG/ML
10 INJECTION INTRAVENOUS 4 TIMES DAILY PRN
Status: DISCONTINUED | OUTPATIENT
Start: 2025-03-29 | End: 2025-03-29 | Stop reason: CLARIF

## 2025-03-29 RX ORDER — POTASSIUM CHLORIDE 7.45 MG/ML
10 INJECTION INTRAVENOUS 4 TIMES DAILY
Status: DISCONTINUED | OUTPATIENT
Start: 2025-03-29 | End: 2025-03-29

## 2025-03-29 RX ORDER — POTASSIUM CHLORIDE 7.45 MG/ML
10 INJECTION INTRAVENOUS PRN
Status: DISCONTINUED | OUTPATIENT
Start: 2025-03-29 | End: 2025-03-30

## 2025-03-29 RX ADMIN — VALPROATE SODIUM 250 MG: 100 INJECTION, SOLUTION INTRAVENOUS at 09:31

## 2025-03-29 RX ADMIN — MIDODRINE HYDROCHLORIDE 10 MG: 10 TABLET ORAL at 09:07

## 2025-03-29 RX ADMIN — VALPROATE SODIUM 250 MG: 100 INJECTION, SOLUTION INTRAVENOUS at 15:10

## 2025-03-29 RX ADMIN — SODIUM CHLORIDE: 0.45 INJECTION, SOLUTION INTRAVENOUS at 01:30

## 2025-03-29 RX ADMIN — TAMSULOSIN HYDROCHLORIDE 0.4 MG: 0.4 CAPSULE ORAL at 20:17

## 2025-03-29 RX ADMIN — DEXTROSE MONOHYDRATE 250 ML: 100 INJECTION, SOLUTION INTRAVENOUS at 16:27

## 2025-03-29 RX ADMIN — HEPARIN SODIUM 5000 UNITS: 5000 INJECTION INTRAVENOUS; SUBCUTANEOUS at 22:00

## 2025-03-29 RX ADMIN — LAMOTRIGINE 25 MG: 25 TABLET ORAL at 09:06

## 2025-03-29 RX ADMIN — VENLAFAXINE 37.5 MG: 37.5 TABLET ORAL at 09:07

## 2025-03-29 RX ADMIN — VENLAFAXINE 37.5 MG: 37.5 TABLET ORAL at 20:17

## 2025-03-29 RX ADMIN — VALPROATE SODIUM 250 MG: 100 INJECTION, SOLUTION INTRAVENOUS at 03:18

## 2025-03-29 RX ADMIN — POTASSIUM PHOSPHATE, MONOBASIC POTASSIUM PHOSPHATE, DIBASIC 30 MMOL: 224; 236 INJECTION, SOLUTION, CONCENTRATE INTRAVENOUS at 13:26

## 2025-03-29 RX ADMIN — OXYCODONE HYDROCHLORIDE AND ACETAMINOPHEN 1 TABLET: 5; 325 TABLET ORAL at 15:01

## 2025-03-29 RX ADMIN — POTASSIUM CHLORIDE: 2 INJECTION, SOLUTION, CONCENTRATE INTRAVENOUS at 10:43

## 2025-03-29 RX ADMIN — VALPROATE SODIUM 250 MG: 100 INJECTION, SOLUTION INTRAVENOUS at 21:46

## 2025-03-29 RX ADMIN — HEPARIN SODIUM 5000 UNITS: 5000 INJECTION INTRAVENOUS; SUBCUTANEOUS at 12:53

## 2025-03-29 RX ADMIN — POTASSIUM CHLORIDE 10 MEQ: 7.46 INJECTION, SOLUTION INTRAVENOUS at 05:36

## 2025-03-29 RX ADMIN — POTASSIUM CHLORIDE 10 MEQ: 7.46 INJECTION, SOLUTION INTRAVENOUS at 07:14

## 2025-03-29 RX ADMIN — POTASSIUM CHLORIDE 10 MEQ: 7.46 INJECTION, SOLUTION INTRAVENOUS at 09:01

## 2025-03-29 RX ADMIN — HEPARIN SODIUM 5000 UNITS: 5000 INJECTION INTRAVENOUS; SUBCUTANEOUS at 05:36

## 2025-03-29 RX ADMIN — MIDODRINE HYDROCHLORIDE 10 MG: 10 TABLET ORAL at 12:53

## 2025-03-29 RX ADMIN — MIRTAZAPINE 7.5 MG: 15 TABLET, FILM COATED ORAL at 20:17

## 2025-03-29 RX ADMIN — LINEZOLID 600 MG: 600 INJECTION, SOLUTION INTRAVENOUS at 05:32

## 2025-03-29 RX ADMIN — OXYCODONE HYDROCHLORIDE AND ACETAMINOPHEN 1 TABLET: 5; 325 TABLET ORAL at 23:31

## 2025-03-29 RX ADMIN — PIPERACILLIN AND TAZOBACTAM 3375 MG: 3; .375 INJECTION, POWDER, LYOPHILIZED, FOR SOLUTION INTRAVENOUS at 09:11

## 2025-03-29 RX ADMIN — POTASSIUM CHLORIDE 10 MEQ: 7.46 INJECTION, SOLUTION INTRAVENOUS at 10:44

## 2025-03-29 RX ADMIN — DEXTROSE MONOHYDRATE 250 ML: 100 INJECTION, SOLUTION INTRAVENOUS at 20:03

## 2025-03-29 RX ADMIN — MIDODRINE HYDROCHLORIDE 10 MG: 10 TABLET ORAL at 15:01

## 2025-03-29 ASSESSMENT — PAIN DESCRIPTION - ORIENTATION
ORIENTATION: LEFT
ORIENTATION: OTHER (COMMENT)
ORIENTATION: LEFT

## 2025-03-29 ASSESSMENT — PAIN SCALES - GENERAL
PAINLEVEL_OUTOF10: 0
PAINLEVEL_OUTOF10: 0
PAINLEVEL_OUTOF10: 8
PAINLEVEL_OUTOF10: 8

## 2025-03-29 ASSESSMENT — PAIN DESCRIPTION - DESCRIPTORS
DESCRIPTORS: OTHER (COMMENT)
DESCRIPTORS: ACHING
DESCRIPTORS: ACHING

## 2025-03-29 ASSESSMENT — PAIN DESCRIPTION - LOCATION
LOCATION: LEG
LOCATION: OTHER (COMMENT)
LOCATION: LEG

## 2025-03-29 ASSESSMENT — PAIN - FUNCTIONAL ASSESSMENT: PAIN_FUNCTIONAL_ASSESSMENT: ACTIVITIES ARE NOT PREVENTED

## 2025-03-30 LAB
ABSOLUTE BANDS: 0.11 K/UL (ref 0–1)
ANION GAP SERPL CALCULATED.3IONS-SCNC: 8 MMOL/L (ref 9–16)
BANDS: 1 % (ref 0–10)
BASOPHILS # BLD: 0 K/UL (ref 0–0.2)
BASOPHILS NFR BLD: 0 % (ref 0–2)
BUN SERPL-MCNC: 27 MG/DL (ref 8–23)
CALCIUM SERPL-MCNC: 7.7 MG/DL (ref 8.6–10.4)
CHLORIDE SERPL-SCNC: 111 MMOL/L (ref 98–107)
CO2 SERPL-SCNC: 22 MMOL/L (ref 20–31)
CREAT SERPL-MCNC: 1.3 MG/DL (ref 0.7–1.2)
EOSINOPHIL # BLD: 0.43 K/UL (ref 0–0.4)
EOSINOPHILS RELATIVE PERCENT: 4 % (ref 0–4)
ERYTHROCYTE [DISTWIDTH] IN BLOOD BY AUTOMATED COUNT: 15.8 % (ref 11.5–14.9)
GFR, ESTIMATED: 53 ML/MIN/1.73M2
GLUCOSE BLD-MCNC: 101 MG/DL (ref 75–110)
GLUCOSE BLD-MCNC: 44 MG/DL (ref 75–110)
GLUCOSE BLD-MCNC: 46 MG/DL (ref 75–110)
GLUCOSE BLD-MCNC: 48 MG/DL (ref 75–110)
GLUCOSE BLD-MCNC: 52 MG/DL (ref 75–110)
GLUCOSE BLD-MCNC: 59 MG/DL (ref 75–110)
GLUCOSE BLD-MCNC: 77 MG/DL (ref 75–110)
GLUCOSE BLD-MCNC: 78 MG/DL (ref 75–110)
GLUCOSE BLD-MCNC: 88 MG/DL (ref 75–110)
GLUCOSE BLD-MCNC: 92 MG/DL (ref 75–110)
GLUCOSE SERPL-MCNC: 94 MG/DL (ref 74–99)
HCT VFR BLD AUTO: 32.9 % (ref 41–53)
HGB BLD-MCNC: 10.8 G/DL (ref 13.5–17.5)
LYMPHOCYTES NFR BLD: 3.42 K/UL (ref 1–4.8)
LYMPHOCYTES RELATIVE PERCENT: 32 % (ref 24–44)
MCH RBC QN AUTO: 30.9 PG (ref 26–34)
MCHC RBC AUTO-ENTMCNC: 32.9 G/DL (ref 31–37)
MCV RBC AUTO: 94 FL (ref 80–100)
METAMYELOCYTES ABSOLUTE COUNT: 0.21 K/UL
METAMYELOCYTES: 2 %
MONOCYTES NFR BLD: 0.86 K/UL (ref 0.1–1.3)
MONOCYTES NFR BLD: 8 % (ref 1–7)
MORPHOLOGY: ABNORMAL
MORPHOLOGY: ABNORMAL
MYELOCYTES ABSOLUTE COUNT: 0.11 K/UL
MYELOCYTES: 1 %
NEUTROPHILS NFR BLD: 52 % (ref 36–66)
NEUTS SEG NFR BLD: 5.56 K/UL (ref 1.3–9.1)
PLATELET # BLD AUTO: 133 K/UL (ref 150–450)
PMV BLD AUTO: 8 FL (ref 6–12)
POTASSIUM SERPL-SCNC: 4.1 MMOL/L (ref 3.7–5.3)
RBC # BLD AUTO: 3.5 M/UL (ref 4.5–5.9)
SODIUM SERPL-SCNC: 141 MMOL/L (ref 136–145)
WBC OTHER # BLD: 10.7 K/UL (ref 3.5–11)

## 2025-03-30 PROCEDURE — 99233 SBSQ HOSP IP/OBS HIGH 50: CPT

## 2025-03-30 PROCEDURE — 6370000000 HC RX 637 (ALT 250 FOR IP)

## 2025-03-30 PROCEDURE — 6370000000 HC RX 637 (ALT 250 FOR IP): Performed by: STUDENT IN AN ORGANIZED HEALTH CARE EDUCATION/TRAINING PROGRAM

## 2025-03-30 PROCEDURE — 99232 SBSQ HOSP IP/OBS MODERATE 35: CPT | Performed by: INTERNAL MEDICINE

## 2025-03-30 PROCEDURE — 2060000000 HC ICU INTERMEDIATE R&B

## 2025-03-30 PROCEDURE — 80048 BASIC METABOLIC PNL TOTAL CA: CPT

## 2025-03-30 PROCEDURE — 2500000003 HC RX 250 WO HCPCS: Performed by: NURSE PRACTITIONER

## 2025-03-30 PROCEDURE — 6360000002 HC RX W HCPCS

## 2025-03-30 PROCEDURE — 85025 COMPLETE CBC W/AUTO DIFF WBC: CPT

## 2025-03-30 PROCEDURE — 2580000003 HC RX 258: Performed by: INTERNAL MEDICINE

## 2025-03-30 PROCEDURE — 2580000003 HC RX 258: Performed by: NURSE PRACTITIONER

## 2025-03-30 PROCEDURE — 2500000003 HC RX 250 WO HCPCS: Performed by: INTERNAL MEDICINE

## 2025-03-30 PROCEDURE — 2580000003 HC RX 258

## 2025-03-30 PROCEDURE — 82947 ASSAY GLUCOSE BLOOD QUANT: CPT

## 2025-03-30 PROCEDURE — 6370000000 HC RX 637 (ALT 250 FOR IP): Performed by: NURSE PRACTITIONER

## 2025-03-30 PROCEDURE — 36415 COLL VENOUS BLD VENIPUNCTURE: CPT

## 2025-03-30 PROCEDURE — 6370000000 HC RX 637 (ALT 250 FOR IP): Performed by: INTERNAL MEDICINE

## 2025-03-30 PROCEDURE — APPSS30 APP SPLIT SHARED TIME 16-30 MINUTES: Performed by: NURSE PRACTITIONER

## 2025-03-30 RX ORDER — METOPROLOL TARTRATE 25 MG/1
25 TABLET, FILM COATED ORAL 2 TIMES DAILY
Status: DISCONTINUED | OUTPATIENT
Start: 2025-03-30 | End: 2025-04-03

## 2025-03-30 RX ORDER — METOPROLOL SUCCINATE 50 MG/1
50 TABLET, EXTENDED RELEASE ORAL DAILY
Status: DISCONTINUED | OUTPATIENT
Start: 2025-03-30 | End: 2025-03-30

## 2025-03-30 RX ORDER — DEXTROSE MONOHYDRATE AND SODIUM CHLORIDE 5; .9 G/100ML; G/100ML
INJECTION, SOLUTION INTRAVENOUS CONTINUOUS
Status: DISCONTINUED | OUTPATIENT
Start: 2025-03-30 | End: 2025-03-31

## 2025-03-30 RX ORDER — DOXAZOSIN 1 MG/1
1 TABLET ORAL NIGHTLY
Status: DISCONTINUED | OUTPATIENT
Start: 2025-03-30 | End: 2025-04-06 | Stop reason: HOSPADM

## 2025-03-30 RX ORDER — TAMSULOSIN HYDROCHLORIDE 0.4 MG/1
0.4 CAPSULE ORAL NIGHTLY
Status: DISCONTINUED | OUTPATIENT
Start: 2025-03-30 | End: 2025-04-06 | Stop reason: HOSPADM

## 2025-03-30 RX ADMIN — DEXTROSE MONOHYDRATE 125 ML: 100 INJECTION, SOLUTION INTRAVENOUS at 21:01

## 2025-03-30 RX ADMIN — HEPARIN SODIUM 5000 UNITS: 5000 INJECTION INTRAVENOUS; SUBCUTANEOUS at 14:29

## 2025-03-30 RX ADMIN — DEXTROSE MONOHYDRATE 125 ML: 100 INJECTION, SOLUTION INTRAVENOUS at 22:03

## 2025-03-30 RX ADMIN — VALPROATE SODIUM 250 MG: 100 INJECTION, SOLUTION INTRAVENOUS at 02:49

## 2025-03-30 RX ADMIN — DEXTROSE AND SODIUM CHLORIDE: 5; .9 INJECTION, SOLUTION INTRAVENOUS at 17:26

## 2025-03-30 RX ADMIN — POLYETHYLENE GLYCOL-3350 AND ELECTROLYTES 4000 ML: 236; 6.74; 5.86; 2.97; 22.74 POWDER, FOR SOLUTION ORAL at 16:00

## 2025-03-30 RX ADMIN — VENLAFAXINE 37.5 MG: 37.5 TABLET ORAL at 09:03

## 2025-03-30 RX ADMIN — HEPARIN SODIUM 5000 UNITS: 5000 INJECTION INTRAVENOUS; SUBCUTANEOUS at 21:58

## 2025-03-30 RX ADMIN — DEXTROSE MONOHYDRATE 50 ML: 100 INJECTION, SOLUTION INTRAVENOUS at 22:49

## 2025-03-30 RX ADMIN — VENLAFAXINE 37.5 MG: 37.5 TABLET ORAL at 21:59

## 2025-03-30 RX ADMIN — METOPROLOL TARTRATE 25 MG: 25 TABLET, FILM COATED ORAL at 10:47

## 2025-03-30 RX ADMIN — SODIUM CHLORIDE, PRESERVATIVE FREE 10 ML: 5 INJECTION INTRAVENOUS at 09:06

## 2025-03-30 RX ADMIN — VALPROATE SODIUM 250 MG: 100 INJECTION, SOLUTION INTRAVENOUS at 21:54

## 2025-03-30 RX ADMIN — VALPROATE SODIUM 250 MG: 100 INJECTION, SOLUTION INTRAVENOUS at 15:15

## 2025-03-30 RX ADMIN — MIRTAZAPINE 7.5 MG: 15 TABLET, FILM COATED ORAL at 21:57

## 2025-03-30 RX ADMIN — VALPROATE SODIUM 250 MG: 100 INJECTION, SOLUTION INTRAVENOUS at 09:05

## 2025-03-30 RX ADMIN — HEPARIN SODIUM 5000 UNITS: 5000 INJECTION INTRAVENOUS; SUBCUTANEOUS at 05:57

## 2025-03-30 RX ADMIN — METOPROLOL TARTRATE 25 MG: 25 TABLET, FILM COATED ORAL at 21:57

## 2025-03-30 RX ADMIN — LAMOTRIGINE 25 MG: 25 TABLET ORAL at 09:05

## 2025-03-30 ASSESSMENT — PAIN SCALES - WONG BAKER: WONGBAKER_NUMERICALRESPONSE: HURTS LITTLE MORE

## 2025-03-30 ASSESSMENT — PAIN SCALES - GENERAL
PAINLEVEL_OUTOF10: 0
PAINLEVEL_OUTOF10: 4

## 2025-03-31 ENCOUNTER — ANESTHESIA EVENT (OUTPATIENT)
Dept: ENDOSCOPY | Age: 89
End: 2025-03-31
Payer: MEDICARE

## 2025-03-31 ENCOUNTER — ANESTHESIA (OUTPATIENT)
Dept: ENDOSCOPY | Age: 89
End: 2025-03-31
Payer: MEDICARE

## 2025-03-31 LAB
ABSOLUTE BANDS: 0.19 K/UL (ref 0–1)
ALBUMIN SERPL-MCNC: 2 G/DL (ref 3.5–5.2)
ALP SERPL-CCNC: 87 U/L (ref 40–129)
ALT SERPL-CCNC: 28 U/L (ref 10–50)
ANION GAP SERPL CALCULATED.3IONS-SCNC: 10 MMOL/L (ref 9–16)
AST SERPL-CCNC: 46 U/L (ref 10–50)
BANDS: 2 % (ref 0–10)
BASOPHILS # BLD: 0 K/UL (ref 0–0.2)
BASOPHILS NFR BLD: 0 % (ref 0–2)
BILIRUB SERPL-MCNC: <0.2 MG/DL (ref 0–1.2)
BUN SERPL-MCNC: 19 MG/DL (ref 8–23)
CALCIUM SERPL-MCNC: 7.6 MG/DL (ref 8.6–10.4)
CHLORIDE SERPL-SCNC: 109 MMOL/L (ref 98–107)
CO2 SERPL-SCNC: 21 MMOL/L (ref 20–31)
CREAT SERPL-MCNC: 1.1 MG/DL (ref 0.7–1.2)
EOSINOPHIL # BLD: 0.19 K/UL (ref 0–0.4)
EOSINOPHILS RELATIVE PERCENT: 2 % (ref 0–4)
ERYTHROCYTE [DISTWIDTH] IN BLOOD BY AUTOMATED COUNT: 16.3 % (ref 11.5–14.9)
GFR, ESTIMATED: 64 ML/MIN/1.73M2
GLUCOSE BLD-MCNC: 112 MG/DL (ref 75–110)
GLUCOSE BLD-MCNC: 41 MG/DL (ref 75–110)
GLUCOSE BLD-MCNC: 75 MG/DL (ref 75–110)
GLUCOSE BLD-MCNC: 80 MG/DL (ref 75–110)
GLUCOSE BLD-MCNC: 91 MG/DL (ref 75–110)
GLUCOSE BLD-MCNC: 93 MG/DL (ref 75–110)
GLUCOSE BLD-MCNC: 96 MG/DL (ref 75–110)
GLUCOSE SERPL-MCNC: 96 MG/DL (ref 74–99)
HCT VFR BLD AUTO: 29.5 % (ref 41–53)
HGB BLD-MCNC: 9.2 G/DL (ref 13.5–17.5)
LYMPHOCYTES NFR BLD: 1.15 K/UL (ref 1–4.8)
LYMPHOCYTES RELATIVE PERCENT: 12 % (ref 24–44)
MCH RBC QN AUTO: 30.8 PG (ref 26–34)
MCHC RBC AUTO-ENTMCNC: 31.3 G/DL (ref 31–37)
MCV RBC AUTO: 98.3 FL (ref 80–100)
METAMYELOCYTES ABSOLUTE COUNT: 0.1 K/UL
METAMYELOCYTES: 1 %
MONOCYTES NFR BLD: 0.77 K/UL (ref 0.1–1.3)
MONOCYTES NFR BLD: 8 % (ref 1–7)
MORPHOLOGY: ABNORMAL
MORPHOLOGY: ABNORMAL
MYELOCYTES ABSOLUTE COUNT: 0.19 K/UL
MYELOCYTES: 2 %
NEUTROPHILS NFR BLD: 73 % (ref 36–66)
NEUTS SEG NFR BLD: 7.01 K/UL (ref 1.3–9.1)
PLATELET # BLD AUTO: 103 K/UL (ref 150–450)
PMV BLD AUTO: 7.7 FL (ref 6–12)
POTASSIUM SERPL-SCNC: 3.7 MMOL/L (ref 3.7–5.3)
PROT SERPL-MCNC: 4.8 G/DL (ref 6.6–8.7)
RBC # BLD AUTO: 3 M/UL (ref 4.5–5.9)
SODIUM SERPL-SCNC: 140 MMOL/L (ref 136–145)
WBC OTHER # BLD: 9.6 K/UL (ref 3.5–11)

## 2025-03-31 PROCEDURE — 45380 COLONOSCOPY AND BIOPSY: CPT | Performed by: INTERNAL MEDICINE

## 2025-03-31 PROCEDURE — 6360000002 HC RX W HCPCS

## 2025-03-31 PROCEDURE — 7100000000 HC PACU RECOVERY - FIRST 15 MIN: Performed by: INTERNAL MEDICINE

## 2025-03-31 PROCEDURE — 85025 COMPLETE CBC W/AUTO DIFF WBC: CPT

## 2025-03-31 PROCEDURE — 6370000000 HC RX 637 (ALT 250 FOR IP)

## 2025-03-31 PROCEDURE — 3700000001 HC ADD 15 MINUTES (ANESTHESIA): Performed by: INTERNAL MEDICINE

## 2025-03-31 PROCEDURE — 2580000003 HC RX 258: Performed by: INTERNAL MEDICINE

## 2025-03-31 PROCEDURE — 6370000000 HC RX 637 (ALT 250 FOR IP): Performed by: NURSE PRACTITIONER

## 2025-03-31 PROCEDURE — 6370000000 HC RX 637 (ALT 250 FOR IP): Performed by: ANESTHESIOLOGY

## 2025-03-31 PROCEDURE — 3609010300 HC COLONOSCOPY W/BIOPSY SINGLE/MULTIPLE: Performed by: INTERNAL MEDICINE

## 2025-03-31 PROCEDURE — 6370000000 HC RX 637 (ALT 250 FOR IP): Performed by: INTERNAL MEDICINE

## 2025-03-31 PROCEDURE — 94761 N-INVAS EAR/PLS OXIMETRY MLT: CPT

## 2025-03-31 PROCEDURE — 2060000000 HC ICU INTERMEDIATE R&B

## 2025-03-31 PROCEDURE — 80053 COMPREHEN METABOLIC PANEL: CPT

## 2025-03-31 PROCEDURE — 99214 OFFICE O/P EST MOD 30 MIN: CPT

## 2025-03-31 PROCEDURE — 6360000002 HC RX W HCPCS: Performed by: INTERNAL MEDICINE

## 2025-03-31 PROCEDURE — 94640 AIRWAY INHALATION TREATMENT: CPT

## 2025-03-31 PROCEDURE — 36415 COLL VENOUS BLD VENIPUNCTURE: CPT

## 2025-03-31 PROCEDURE — 2500000003 HC RX 250 WO HCPCS: Performed by: INTERNAL MEDICINE

## 2025-03-31 PROCEDURE — 88305 TISSUE EXAM BY PATHOLOGIST: CPT

## 2025-03-31 PROCEDURE — 2580000003 HC RX 258: Performed by: NURSE PRACTITIONER

## 2025-03-31 PROCEDURE — 2709999900 HC NON-CHARGEABLE SUPPLY: Performed by: INTERNAL MEDICINE

## 2025-03-31 PROCEDURE — 2500000003 HC RX 250 WO HCPCS: Performed by: NURSE PRACTITIONER

## 2025-03-31 PROCEDURE — 2700000000 HC OXYGEN THERAPY PER DAY

## 2025-03-31 PROCEDURE — 99233 SBSQ HOSP IP/OBS HIGH 50: CPT | Performed by: INTERNAL MEDICINE

## 2025-03-31 PROCEDURE — 82947 ASSAY GLUCOSE BLOOD QUANT: CPT

## 2025-03-31 PROCEDURE — 7100000001 HC PACU RECOVERY - ADDTL 15 MIN: Performed by: INTERNAL MEDICINE

## 2025-03-31 PROCEDURE — 3700000000 HC ANESTHESIA ATTENDED CARE: Performed by: INTERNAL MEDICINE

## 2025-03-31 PROCEDURE — 0DBP8ZX EXCISION OF RECTUM, VIA NATURAL OR ARTIFICIAL OPENING ENDOSCOPIC, DIAGNOSTIC: ICD-10-PCS | Performed by: INTERNAL MEDICINE

## 2025-03-31 PROCEDURE — 6370000000 HC RX 637 (ALT 250 FOR IP): Performed by: STUDENT IN AN ORGANIZED HEALTH CARE EDUCATION/TRAINING PROGRAM

## 2025-03-31 PROCEDURE — 6360000002 HC RX W HCPCS: Performed by: NURSE ANESTHETIST, CERTIFIED REGISTERED

## 2025-03-31 RX ORDER — POLYETHYLENE GLYCOL 3350 17 G/17G
17 POWDER, FOR SOLUTION ORAL 2 TIMES DAILY
Status: DISCONTINUED | OUTPATIENT
Start: 2025-03-31 | End: 2025-04-06 | Stop reason: HOSPADM

## 2025-03-31 RX ORDER — IPRATROPIUM BROMIDE AND ALBUTEROL SULFATE 2.5; .5 MG/3ML; MG/3ML
1 SOLUTION RESPIRATORY (INHALATION) ONCE
Status: COMPLETED | OUTPATIENT
Start: 2025-03-31 | End: 2025-03-31

## 2025-03-31 RX ORDER — PROPOFOL 10 MG/ML
INJECTION, EMULSION INTRAVENOUS
Status: DISCONTINUED | OUTPATIENT
Start: 2025-03-31 | End: 2025-03-31 | Stop reason: SDUPTHER

## 2025-03-31 RX ORDER — DEXTROSE MONOHYDRATE 100 MG/ML
INJECTION, SOLUTION INTRAVENOUS CONTINUOUS
Status: DISCONTINUED | OUTPATIENT
Start: 2025-03-31 | End: 2025-04-04

## 2025-03-31 RX ADMIN — VALPROATE SODIUM 250 MG: 100 INJECTION, SOLUTION INTRAVENOUS at 03:44

## 2025-03-31 RX ADMIN — POLYETHYLENE GLYCOL 3350 17 G: 17 POWDER, FOR SOLUTION ORAL at 23:50

## 2025-03-31 RX ADMIN — HEPARIN SODIUM 5000 UNITS: 5000 INJECTION INTRAVENOUS; SUBCUTANEOUS at 23:17

## 2025-03-31 RX ADMIN — DEXTROSE MONOHYDRATE: 100 INJECTION, SOLUTION INTRAVENOUS at 10:30

## 2025-03-31 RX ADMIN — PHENYLEPHRINE HYDROCHLORIDE 100 MCG: 10 INJECTION INTRAVENOUS at 14:59

## 2025-03-31 RX ADMIN — DOXAZOSIN 1 MG: 1 TABLET ORAL at 00:01

## 2025-03-31 RX ADMIN — PROPOFOL 25 MCG/KG/MIN: 10 INJECTION, EMULSION INTRAVENOUS at 14:49

## 2025-03-31 RX ADMIN — LAMOTRIGINE 25 MG: 25 TABLET ORAL at 11:28

## 2025-03-31 RX ADMIN — METOPROLOL TARTRATE 25 MG: 25 TABLET, FILM COATED ORAL at 23:19

## 2025-03-31 RX ADMIN — MIDODRINE HYDROCHLORIDE 10 MG: 10 TABLET ORAL at 11:28

## 2025-03-31 RX ADMIN — MIRTAZAPINE 7.5 MG: 15 TABLET, FILM COATED ORAL at 23:19

## 2025-03-31 RX ADMIN — PHENYLEPHRINE HYDROCHLORIDE 200 MCG: 10 INJECTION INTRAVENOUS at 15:12

## 2025-03-31 RX ADMIN — DOXAZOSIN 1 MG: 1 TABLET ORAL at 23:20

## 2025-03-31 RX ADMIN — MIDODRINE HYDROCHLORIDE 10 MG: 10 TABLET ORAL at 08:14

## 2025-03-31 RX ADMIN — VENLAFAXINE 37.5 MG: 37.5 TABLET ORAL at 08:14

## 2025-03-31 RX ADMIN — VALPROATE SODIUM 250 MG: 100 INJECTION, SOLUTION INTRAVENOUS at 23:36

## 2025-03-31 RX ADMIN — METOPROLOL TARTRATE 25 MG: 25 TABLET, FILM COATED ORAL at 08:14

## 2025-03-31 RX ADMIN — DEXTROSE MONOHYDRATE: 100 INJECTION, SOLUTION INTRAVENOUS at 23:14

## 2025-03-31 RX ADMIN — PHENYLEPHRINE HYDROCHLORIDE 200 MCG: 10 INJECTION INTRAVENOUS at 15:07

## 2025-03-31 RX ADMIN — IPRATROPIUM BROMIDE AND ALBUTEROL SULFATE 1 DOSE: .5; 2.5 SOLUTION RESPIRATORY (INHALATION) at 13:55

## 2025-03-31 RX ADMIN — SODIUM CHLORIDE 200 ML: 9 INJECTION, SOLUTION INTRAVENOUS at 03:43

## 2025-03-31 RX ADMIN — VENLAFAXINE 37.5 MG: 37.5 TABLET ORAL at 23:22

## 2025-03-31 RX ADMIN — VALPROATE SODIUM 250 MG: 100 INJECTION, SOLUTION INTRAVENOUS at 08:27

## 2025-03-31 RX ADMIN — HEPARIN SODIUM 5000 UNITS: 5000 INJECTION INTRAVENOUS; SUBCUTANEOUS at 05:45

## 2025-03-31 RX ADMIN — PHENYLEPHRINE HYDROCHLORIDE 200 MCG: 10 INJECTION INTRAVENOUS at 15:10

## 2025-03-31 ASSESSMENT — PAIN - FUNCTIONAL ASSESSMENT
PAIN_FUNCTIONAL_ASSESSMENT: NONE - DENIES PAIN
PAIN_FUNCTIONAL_ASSESSMENT: 0-10

## 2025-03-31 ASSESSMENT — PAIN SCALES - GENERAL: PAINLEVEL_OUTOF10: 0

## 2025-03-31 ASSESSMENT — ENCOUNTER SYMPTOMS: SHORTNESS OF BREATH: 1

## 2025-03-31 NOTE — FLOWSHEET NOTE
03/30/25 7922   Treatment Team Notification   Reason for Communication Review case   Name of Team Member Notified Chaparrita Myles   Treatment Team Role Advanced Practice Nurse   Method of Communication Secure Message   Response No new orders   Notification Time 2335       RN updated Chaparrita Myles that, \"we are having low blood sugars in this pt's room. His Tube feed is on hold for bowel prep and all meds going through NG tube. We have given 2 bolus of D10 and we now have D10 running at 100mL/hr in addition to his continuous fluids of D5NS. I have communicated this to Nephrology as well.\"    No new interventions at this time.

## 2025-03-31 NOTE — ANESTHESIA POSTPROCEDURE EVALUATION
Department of Anesthesiology  Postprocedure Note    Patient: Michael Mcghee  MRN: [id number]  YOB: 1935  Date of evaluation: 3/31/2025    Procedure Summary       Date: 03/31/25 Room / Location: [place] [address] / [facility]    Anesthesia Start: 1435 Anesthesia Stop: 1529    Procedure: COLONOSCOPY BIOPSY (Rectum) Diagnosis:       Rectal bleeding      (Rectal bleeding [K62.5])    Surgeons: Astrid Cummins MD Responsible Provider: Guillermina Melissa MD    Anesthesia Type: MAC ASA Status: 4            Anesthesia Type: MAC    Aixa Phase I: Aixa Score: 8    Aixa Phase II:      Anesthesia Post Evaluation    Comments: POST- ANESTHESIA EVALUATION       Pt Name: Michael Mcghee  MRN: [id number]  YOB: 1935  Date of evaluation: 3/31/2025  Time:  5:20 PM      BP (!) 101/57   Pulse 83   Temp 98.8 °F (37.1 °C)   Resp 21   Ht 1.778 m (5' 10\")   Wt 99.8 kg (220 lb)   SpO2 100%   BMI 31.57 kg/m²      Consciousness Level  Awake  Cardiopulmonary Status  Stable  Pain Adequately Treated YES  Nausea / Vomiting  NO  Adequate Hydration  YES  Anesthesia Related Complications NONE      Electronically signed by Guillermina Melissa MD on 3/31/2025 at 5:20 PM           No notable events documented.

## 2025-03-31 NOTE — FLOWSHEET NOTE
03/30/25 2307   Treatment Team Notification   Reason for Communication Review case   Name of Team Member Notified Dr. Villarreal   Treatment Team Role Consulting Provider   Method of Communication Secure Message   Response See orders   Notification Time 2341       RN update Dr. Villarreal that, \"Pt has NG tube and is currently getting bowel prep through it for AM colonoscopy. Pt's blood sugars are low. We have D10 @100mL/hr running for hypoglycemic protocol. Pt also has D5 NS @50mL/hr as continuous fluids. Do you still want the D5NS running while we are running the D10?\"    RN notified to d/c IV fluids. See MAR/orders.

## 2025-03-31 NOTE — FLOWSHEET NOTE
03/31/25 1628   Treatment Team Notification   Reason for Communication Review case   Name of Team Member Notified Candi Cabezas   Treatment Team Role Advanced Practice Nurse   Method of Communication Secure Message   Response Waiting for response   Notification Time 1628     prior to colonoscopy today, pt was on tube feeding per ngt. I see in your note, clear liquid diet. I don't think he's following commands to eat orally, hence ngt. please advise or do you want me to reach out to primary?    Orders to resume TF per ngt

## 2025-03-31 NOTE — FLOWSHEET NOTE
03/31/25 0756   Treatment Team Notification   Reason for Communication Review case   Name of Team Member Notified Candi Cabezas   Treatment Team Role Advanced Practice Nurse   Method of Communication Secure Message   Response No new orders;Waiting for response   Notification Time 0756     strict npo for colonoscopy today, not sure of time. Do you want meds given crushed thru ngt this morning? has lopressor, cardura etc due.    Orders received. May give thru ngt

## 2025-03-31 NOTE — OP NOTE
Colonoscopy report    Colonoscopy Procedure Note    Procedure:  Colonoscopy with Biopsies    Procedure Date: 3/31/2025    Indications:  Rectal ulcer, stercoral colitis    Sedation:  MAC    Attending Physician:  Dr. Astrid Cummins MD.     Assistant Physician: None    Consent:   Informed consent was obtained for the procedure after explaining the risks including bleeding, perforation, aspiration, arrhythmia, risks related to sedation, reaction to medications and rarely death, benefits and alternatives to the patient. The patient verbalized understanding and agreed to proceed with the procedure.       Procedure Details:  The patient was placed in the left lateral decubitus position.  Oxygen and cardiac monitoring equipment was attached. The patient's vital signs were monitored continuously  throughout the procedure. After appropriate sedation was achieved, a rectal examination was performed.  The pediatric colonoscope was inserted into the rectum and advanced under direct vision to the ascending colon.  The quality of the colonic preparation was good.  A careful inspection was made as the colonoscope was withdrawn, including a retroflexed view of the rectum; findings and interventions are described below.  Appropriate photodocumentation was obtained.           Complications:  None    Estimated blood loss:  Minimal           Disposition:  Home           Condition: stable    Withdrawal Time: Not applicable    Findings:   The bowel prep was good.  The advancement of scope was extremely challenging with severe tortuosity throughout the colon.  The scope was only able to advance to a ascending colon.  Further advancement was not achieved despite different maneuvers and abdominal pressure.  Given the medical instability, further maneuvers like dynamic position change was not attempted.  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  Erythema and ulceration noted in the rectum (5 mm) suggestive

## 2025-03-31 NOTE — FLOWSHEET NOTE
03/30/25 8619   Treatment Team Notification   Reason for Communication Review case   Name of Team Member Notified Chaparrita Myles   Treatment Team Role Advanced Practice Nurse   Method of Communication Secure Message   Response See orders   Notification Time 2307       RN updated Chaparrita Myles that pt currenlty getting meds and bowel prep through NG tube and after talking to pharmacy, Flomax is not able to be crushed and administered through NG. Chaparrita Myles to place own orders. See MAR/orders.

## 2025-03-31 NOTE — ANESTHESIA PRE PROCEDURE
(TIVA  Patient has a DNR (do not resuscitate) status  Will d/w legal guardian or POA  )  Induction: intravenous.    MIPS: Prophylactic antiemetics administered.  Anesthetic plan and risks discussed with patient.      Plan discussed with CRNA.                Guillermina Melissa MD   3/31/2025

## 2025-04-01 ENCOUNTER — APPOINTMENT (OUTPATIENT)
Dept: GENERAL RADIOLOGY | Age: 89
DRG: 682 | End: 2025-04-01
Payer: MEDICARE

## 2025-04-01 LAB
ANION GAP SERPL CALCULATED.3IONS-SCNC: 7 MMOL/L (ref 9–16)
BUN SERPL-MCNC: 14 MG/DL (ref 8–23)
CALCIUM SERPL-MCNC: 8 MG/DL (ref 8.6–10.4)
CHLORIDE SERPL-SCNC: 110 MMOL/L (ref 98–107)
CO2 SERPL-SCNC: 24 MMOL/L (ref 20–31)
CORTIS SERPL-MCNC: 13.6 UG/DL (ref 2.5–19.5)
CORTISOL COLLECTION INFO: NORMAL
CREAT SERPL-MCNC: 1.1 MG/DL (ref 0.7–1.2)
GFR, ESTIMATED: 64 ML/MIN/1.73M2
GLUCOSE BLD-MCNC: 121 MG/DL (ref 75–110)
GLUCOSE BLD-MCNC: 136 MG/DL (ref 75–110)
GLUCOSE BLD-MCNC: 90 MG/DL (ref 75–110)
GLUCOSE BLD-MCNC: 99 MG/DL (ref 75–110)
GLUCOSE SERPL-MCNC: 101 MG/DL (ref 74–99)
POTASSIUM SERPL-SCNC: 3.7 MMOL/L (ref 3.7–5.3)
SODIUM SERPL-SCNC: 141 MMOL/L (ref 136–145)

## 2025-04-01 PROCEDURE — 6370000000 HC RX 637 (ALT 250 FOR IP): Performed by: INTERNAL MEDICINE

## 2025-04-01 PROCEDURE — 99233 SBSQ HOSP IP/OBS HIGH 50: CPT | Performed by: INTERNAL MEDICINE

## 2025-04-01 PROCEDURE — 92611 MOTION FLUOROSCOPY/SWALLOW: CPT

## 2025-04-01 PROCEDURE — 2580000003 HC RX 258: Performed by: INTERNAL MEDICINE

## 2025-04-01 PROCEDURE — 74230 X-RAY XM SWLNG FUNCJ C+: CPT

## 2025-04-01 PROCEDURE — 6370000000 HC RX 637 (ALT 250 FOR IP): Performed by: NURSE PRACTITIONER

## 2025-04-01 PROCEDURE — 36415 COLL VENOUS BLD VENIPUNCTURE: CPT

## 2025-04-01 PROCEDURE — 6360000002 HC RX W HCPCS: Performed by: INTERNAL MEDICINE

## 2025-04-01 PROCEDURE — 80048 BASIC METABOLIC PNL TOTAL CA: CPT

## 2025-04-01 PROCEDURE — 82947 ASSAY GLUCOSE BLOOD QUANT: CPT

## 2025-04-01 PROCEDURE — 2060000000 HC ICU INTERMEDIATE R&B

## 2025-04-01 PROCEDURE — 2500000003 HC RX 250 WO HCPCS: Performed by: INTERNAL MEDICINE

## 2025-04-01 PROCEDURE — 71045 X-RAY EXAM CHEST 1 VIEW: CPT

## 2025-04-01 PROCEDURE — 92610 EVALUATE SWALLOWING FUNCTION: CPT

## 2025-04-01 PROCEDURE — 82533 TOTAL CORTISOL: CPT

## 2025-04-01 RX ORDER — FUROSEMIDE 10 MG/ML
20 INJECTION INTRAMUSCULAR; INTRAVENOUS ONCE
Status: COMPLETED | OUTPATIENT
Start: 2025-04-01 | End: 2025-04-01

## 2025-04-01 RX ADMIN — VENLAFAXINE 37.5 MG: 37.5 TABLET ORAL at 20:59

## 2025-04-01 RX ADMIN — TAMSULOSIN HYDROCHLORIDE 0.4 MG: 0.4 CAPSULE ORAL at 21:00

## 2025-04-01 RX ADMIN — VALPROATE SODIUM 250 MG: 100 INJECTION, SOLUTION INTRAVENOUS at 05:03

## 2025-04-01 RX ADMIN — VALPROATE SODIUM 250 MG: 100 INJECTION, SOLUTION INTRAVENOUS at 22:56

## 2025-04-01 RX ADMIN — HEPARIN SODIUM 5000 UNITS: 5000 INJECTION INTRAVENOUS; SUBCUTANEOUS at 13:43

## 2025-04-01 RX ADMIN — SODIUM CHLORIDE, PRESERVATIVE FREE 10 ML: 5 INJECTION INTRAVENOUS at 10:05

## 2025-04-01 RX ADMIN — DEXTROSE MONOHYDRATE: 100 INJECTION, SOLUTION INTRAVENOUS at 16:04

## 2025-04-01 RX ADMIN — FUROSEMIDE 20 MG: 10 INJECTION, SOLUTION INTRAMUSCULAR; INTRAVENOUS at 20:53

## 2025-04-01 RX ADMIN — MIRTAZAPINE 7.5 MG: 15 TABLET, FILM COATED ORAL at 20:59

## 2025-04-01 RX ADMIN — MIDODRINE HYDROCHLORIDE 10 MG: 10 TABLET ORAL at 16:11

## 2025-04-01 RX ADMIN — POLYETHYLENE GLYCOL 3350 17 G: 17 POWDER, FOR SOLUTION ORAL at 21:06

## 2025-04-01 RX ADMIN — VALPROATE SODIUM 250 MG: 100 INJECTION, SOLUTION INTRAVENOUS at 16:29

## 2025-04-01 RX ADMIN — MIDODRINE HYDROCHLORIDE 10 MG: 10 TABLET ORAL at 09:39

## 2025-04-01 RX ADMIN — METOPROLOL TARTRATE 25 MG: 25 TABLET, FILM COATED ORAL at 21:01

## 2025-04-01 RX ADMIN — MIDODRINE HYDROCHLORIDE 10 MG: 10 TABLET ORAL at 11:30

## 2025-04-01 RX ADMIN — VENLAFAXINE 37.5 MG: 37.5 TABLET ORAL at 11:30

## 2025-04-01 RX ADMIN — HEPARIN SODIUM 5000 UNITS: 5000 INJECTION INTRAVENOUS; SUBCUTANEOUS at 06:13

## 2025-04-01 RX ADMIN — HYDROXYZINE HYDROCHLORIDE 50 MG: 50 TABLET, FILM COATED ORAL at 21:52

## 2025-04-01 RX ADMIN — METOPROLOL TARTRATE 25 MG: 25 TABLET, FILM COATED ORAL at 09:40

## 2025-04-01 RX ADMIN — HEPARIN SODIUM 5000 UNITS: 5000 INJECTION INTRAVENOUS; SUBCUTANEOUS at 20:57

## 2025-04-01 RX ADMIN — VALPROATE SODIUM 250 MG: 100 INJECTION, SOLUTION INTRAVENOUS at 11:28

## 2025-04-01 RX ADMIN — LAMOTRIGINE 25 MG: 25 TABLET ORAL at 09:49

## 2025-04-01 RX ADMIN — POLYETHYLENE GLYCOL 3350 17 G: 17 POWDER, FOR SOLUTION ORAL at 09:40

## 2025-04-01 NOTE — FLOWSHEET NOTE
04/01/25 1536   Treatment Team Notification   Reason for Communication Review case   Name of Team Member Notified Dr Morales   Treatment Team Role Attending Provider   Method of Communication Secure Message   Response Waiting for response   Notification Time 1536     He passed video swallow, recommended pureed solids and mildly thick liquids after his ngt is out. May we dc ngt and put the new diet in?    Orders received.     Do you want to stop tube feeding or D10 infusion today or wait til tomorrow to verify oral intake sufficiency?

## 2025-04-01 NOTE — PROCEDURES
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  Past Surgical History:  has a past surgical history that includes Ankle surgery (Bilateral); Cardiac surgery; Cardiac surgery; joint replacement (Right); Nerve Block (2014); Cardiac catheterization (2024); Cardiac procedure (N/A, 3/15/2024); IR NONTUNNELED VASCULAR CATHETER > 5 YEARS (3/24/2025); and Colonoscopy (N/A, 3/31/2025).               Type of Study: Initial MBS       Recent CXR/CT of Chest: Date - CXR- Congestion with bilateral pleural effusions.     Support tubes as described above.    Patient Complaints/Reason for Referral:  Michael Mcghee was referred for a MBS to assess the efficiency of his/her swallow function, assess for aspiration, and to make recommendations regarding safe dietary consistencies, effective compensatory strategies, and safe eating environment.       Onset of problem:      Unable to r/o or confirm aspiration at bedside, prompting this test.    Pt. Has NGT in place and audible wheeze upon exhalation.       
PROCEDURE NOTE  Date: 3/24/2025   Name: Michael Mcghee  YOB: 1935    Procedures    Picc placement order:    Benefits include stable, long term intravenous access. Blood draws. Peripheral vein preservation. Safely deliver vesicant medications.    Risks include failure to obtain the desired result(s) of the procedure, discomfort, injury, the need for additional procedures/therapies, permanent loss of body function, bleeding/bruising, arterial puncture, air embolism, nerve damage, hematoma, phlebitis, catheter fracture/rupture, catheter embolism, catheter occlusion, catheter migration, catheter site infection, unintentional/accidental removal of catheter, bloodstream infection, infiltration, cardiac arrhythmia, vein thrombosis, difficult catheter removal.   Alternatives discussed including centrally inserted central catheter, as well as less invasive procedures such as multiple peripheral IVs, extended dwell catheters and midline placements.     PICC placement reviewed with nephrologist, Dr. Villarreal and is agreeable to PICC placement.    Phone consent obtained by proceduralist, signed by Marianne/KISHORE.  Phone consent witnessed by EDWARD Mcgrath.    Prescribed therapy: limited peripheral vessels suitable for access; lab draws  Peripheral ultrasound assessment done. Plan for right brachial vein insertion.   Product type: Arrow non tapered power injectable PICC  History/Labs/Allergies Reviewed  Placed By: Macey Viveros RN IV Team  Time out Performed using Two Identifiers  Catheter info: 5french - dual lumen  Total length: 42cm  External catheter length: 1cm  Extremity circumference at site = 30cm  Number of attempts: 1  Estimated blood loss: 1 ml  Placement verified by: positive blood return & flushes easily  Special equipment used: VPS ECG Tip tracker system, ultrasound, MST, and micro-introducer needle.   Catheter securement: Adhesive securement device  Catheter adhesive (SecureportIV) utilized at insertion 
MD   Stopped at 03/25/25 1405    heparin (porcine) 1000 UNIT/ML injection 1,100 Units  1,100 Units IntraCATHeter PRN Jadiel Villarreal MD   1,100 Units at 03/24/25 1823    heparin (porcine) 1000 UNIT/ML injection 1,400 Units  1,400 Units IntraCATHeter PRN Jadiel Villarreal MD   1,400 Units at 03/24/25 1823    sodium chloride flush 0.9 % injection 5-40 mL  5-40 mL IntraVENous 2 times per day Jadiel Villarreal MD        sodium chloride flush 0.9 % injection 5-40 mL  5-40 mL IntraVENous PRN Jadiel Villarreal MD        0.9 % sodium chloride infusion   IntraVENous PRN Jadiel Villarreal MD        lidocaine PF 1 % injection 50 mg  50 mg IntraDERmal Once Jadiel Villarreal MD        sodium chloride flush 0.9 % injection 5-40 mL  5-40 mL IntraVENous PRN Chaparrita Myles APRN - CNP        0.9 % sodium chloride infusion   IntraVENous PRN Chaparrita Myles APRN - CNP        heparin (porcine) injection 5,000 Units  5,000 Units SubCUTAneous 3 times per day Chaparrita Myles APRN - CNP   5,000 Units at 03/25/25 0523    ondansetron (ZOFRAN-ODT) disintegrating tablet 4 mg  4 mg Oral Q8H PRN Chaparrita Myles APRN - CNP        Or    ondansetron (ZOFRAN) injection 4 mg  4 mg IntraVENous Q6H PRN Chaparrita Myles APRN - CNP        acetaminophen (TYLENOL) tablet 650 mg  650 mg Oral Q6H PRN Chaparrita Myles APRN - CNP   650 mg at 03/25/25 0955    Or    acetaminophen (TYLENOL) suppository 650 mg  650 mg Rectal Q6H PRN Chaparrita Myles APRN - CNP        hydrALAZINE (APRESOLINE) injection 5 mg  5 mg IntraVENous Q6H PRN Chaparrita Myles APRN - CNP   5 mg at 03/23/25 0541    [Held by provider] atorvastatin (LIPITOR) tablet 20 mg  20 mg Oral Nightly Latosha Ocampo MD        ipratropium 0.5 mg-albuterol 2.5 mg (DUONEB) nebulizer solution 1 Dose  1 Dose Inhalation Q4H PRN Latosha Ocampo MD        mirtazapine (REMERON) tablet 7.5 mg  7.5 mg Oral Nightly Latosha Ocampo MD   7.5 mg at 03/24/25 2042    tamsulosin (FLOMAX) capsule 0.4 mg  0.4 mg

## 2025-04-01 NOTE — FLOWSHEET NOTE
04/01/25 1852   Treatment Team Notification   Reason for Communication Review case   Name of Team Member Notified Dr Platt   Treatment Team Role Consulting Provider   Method of Communication Secure Message   Response Waiting for response   Notification Time 1852     your note states iv lasix x1 dose but not ordered. please address. thanks

## 2025-04-01 NOTE — FLOWSHEET NOTE
04/01/25 0841   Treatment Team Notification   Reason for Communication Review case   Name of Team Member Notified Padmaja Ramirez speech therapy   Method of Communication Secure Message   Response Waiting for response   Notification Time 0841     pt is alert /confused but following commands. Can you do bedside swallow this morning?    Can do at

## 2025-04-01 NOTE — CONSULTS
[facility] PULMONARY & CRITICAL CARE SPECIALISTS   CONSULT NOTE:      DATE OF CONSULT 3/25/2025    REASON FOR CONSULTATION:  Critical care management      PCP Bruno Metcalf, APRN - CNP     CHIEF COMPLAINT: Mental status changes, hypotension    HISTORY OF PRESENT ILLNESS:     Mr. Mcghee is an 89-year-old male that our group saw back in August when he presented with altered mental status.  At that time, he was alert and oriented.  He was transferred out of the ICU without any acute pulmonary issues.  He was readmitted to [facility] March 23 once again with altered mentation, aggressiveness with staff and renal failure.  He had an elevated creatinine of 5.9 and a potassium of 6.6, and a sodium of 150.  In the past, he had been a DNR CCA with intubation.  There was some initial confusion about who would be the power of .  The family apparently gave consent to having dialysis which was done yesterday.  In the interim, he has been hypotensive, increasingly unresponsive, with systolic blood pressure in the 60s to 70s.  He has gotten approximately 1 L of fluid    When I went to see him, the patient was essentially unresponsive but when a swab was put in his mouth he did suck on it and became more agitated.  He has an NG in place.  He does not follow any commands for me.  His blood pressure is 82/61.  Yari who is his niece is in the room.  I also spoke to Marianne Abdi who is the healthcare power of .    I ordered a 500 mL bolus of fluid.  He is currently on Zosyn and Zyvox    From previous notes, he was a  and a distant smoker quitting in 1991 he has no documented pulmonary disease.  In July 2024, he was admitted to [facility] for altered mental status and was placed on mechanical ventilation.  Blood cultures were positive for E. coli.  It was felt to be a urinary source as his urine culture was also positive.  Additionally, he he also had Pseudomonas in the sputum.    I had a 
Clinical Ethics Consultation Note    History and Context:  Mr. Mcghee is an 89-year-old male that our group saw back in August when he presented with altered mental status. At that time, he was alert and oriented. He was transferred out of the ICU without any acute pulmonary issues. He was readmitted to [facility] March 23 once again with altered mentation, aggressiveness with staff and renal failure.     Length of Stay: 9  Verified Advance Directive: Yes    Process:  I reviewed the chart, spoke with CM, Bedside RN, and Palliative Care    Ethical Question(s) and Concern(s):  Communication / Family Dynamics    Analysis:  Patient has ACP documents. The concern is how the family involvement can significantly impact patient outcomes, both positively and negatively. Currently, additional support is needed from (Marianne) HCPOA.      Recommendations:  Ethics recommends proceeding with due care toward sensitive family dynamics between Healthcare POA (Marianne) and Financial POA (Yari). After swallow study, next steps/decisions will be made on behalf of the patient. The role of the POA in the coming days will be to help interpret and make decisions based on patient known wishes.     Ethics will continue to follow.     Closing:  Please PerfectServe or call with questions.   Zack Pickett  Ethics Services     Primary Ethical Themes: Complex Discharge Planning     
Comprehensive Nutrition Assessment    Type and Reason for Visit:  Initial, Consult, Nutrition support, Positive nutrition screen, Wound    Nutrition Recommendations/Plan:   Recommend tube-feeding with Renal (Nepro 1.8) formula in 5 boluses of 240 mL + 1 TF20 daily.  Regimen provides 2240 kcal, 117 gm protein.  Flush 30 mL before and after TF20 administration.     Malnutrition Assessment:  Malnutrition Status:  At risk for malnutrition (Current medical condition and wounds) (03/25/25 1637)    Context:  Chronic Illness     Findings of the 6 clinical characteristics of malnutrition:  Energy Intake:  Unable to assess  Weight Loss:   (2.6% wt loss in 12 months; may in part be d/t fluid shifts)     Body Fat Loss:  Unable to assess     Muscle Mass Loss:  Unable to assess    Fluid Accumulation:  No fluid accumulation     Strength:  Not Performed    Nutrition Assessment:    Pt now in ICU, admitted for ARF, was this morning in PCU, with PMH of DM2, CHF, CAD, dementia; [facility] resident. Per RN request, ordered bolus tube-feeding this morning; RN reports Pt's high risk of aspiration pneumonia and that Pt has presented with minimal PO intake since 3/22. Unable to talk to Pt- EEG happening at the time of visit. Had hemodialysis on 3/24.    Nutrition Related Findings:    Labs: WBC 13.9, Gluc 76, K 2.8, BUN 53, Creat 3.0, GFR 19. No edema. BM 3/24. Remeron, Zosyn. Wound Type: Multiple, Deep Tissue Injury, Pressure Injury, Stage I, Unstageable       Current Nutrition Intake & Therapies:    Average Meal Intake: Unable to assess  Average Supplements Intake: Unable to assess  ADULT DIET; Regular; No Added Salt (3-4 gm); Low Potassium (Less than 3000 mg/day); Low Phosphorus (Less than 1000 mg)  ADULT TUBE FEEDING; Nasogastric; Renal Formula; Bolus; 5 Times Daily; 240; Syringe Push; 30; Other (specify); 30 mL before and after TF20 administration; Protein; 1 Dose; Daily  Current Tube Feeding (TF) Orders:  Feeding Route: 
[facility]    Consult                        Today's Date: 3/25/2025  Patient Name: Michael Mcghee  Date of admission: 3/22/2025  6:59 PM  Patient's age: 89 y.o., 11/17/1935  Admission Dx: Hyperkalemia [E87.5]  Hypoglycemia [E16.2]  Left hip pain [M25.552]  Acute renal failure (ARF) [N17.9]  Acute renal failure, unspecified acute renal failure type [N17.9]    Reason for Consult:  Cardiac evaluation    Requesting Physician: Latosha Ocampo MD    CHIEF COMPLAINT:  elevated troponin    History Obtained From:  patient, electronic medical record    HISTORY OF PRESENT ILLNESS:      The patient is a 89 y.o.  male who is admitted to the hospital for Acute Renal failure.    The patient is a 89 y.o.  Non- / non  male who presents with Aggressive Behavior, Hypoglycemia, and Hip Pain   and he is admitted to the hospital for the management of ARF. Patient was sent in from his facility for altered mentation. Patient was being more aggressive with staff and was unable to be oriented. Patient has a significant medical history of CHF, CAD, diabetes, and dementia. Upon arrival to the hospital patient was complaining of left hip. Along with disorientation to situation, place, and time. ED physician called patient's POA to discuss plan of care. Unfortunately there is some miscommunication and current POA status. The current legally listed POA on all paperwork has not had contact with the patient in years according to POA there is a niece of the patient that has been making all medical decisions, but she does not have any legal paperwork to document this. Current legal POA is uncomfortable starting hemodialysis until discussion is had with the niece.     Follows OP with our group with last OP OV 7/2024 by TK:  PLAN/IMPRESSION:     -CAD status post CABG and recent cardiac cath as above currently stable with no signs or symptoms of ischemia.  Continue with aspirin, beta-blockers, and statins.  I gave 
[facility]    Hunger Screening     Within the past 12 months we worried whether our food would run out before we got money to buy more.: Never True     Within the past 12 months the food we bought just didn't last and we didn't have money to get more.: Never True   Transportation Needs: No Transportation Needs (7/11/2024)    [facility]    PRAPARE - Transportation     Lack of Transportation (Medical): No     Lack of Transportation (Non-Medical): No   Physical Activity: Not on file   Stress: Not on file   Social Connections: Not on file   Intimate Partner Violence: Not on file   Housing Stability: Low Risk  (7/11/2024)    [facility]    Housing Instability     Are you worried or concerned that in the next two months you may not have stable housing that you own, rent or stay in as a part of a household?: No       Family History:    Family History   Problem Relation Age of Onset    High Blood Pressure Mother     Heart Disease Mother     Cancer Mother     Heart Disease Father     High Blood Pressure Father      Previous Urologic Family history: none    REVIEW OF SYSTEMS:  Constitutional: negative  Eyes: negative  Respiratory: negative  Cardiovascular: negative  Gastrointestinal: negative  Genitourinary: see HPI  Musculoskeletal: negative  Skin: negative   Neurological: negative  Hematological/Lymphatic: negative  Psychological: negative    Physical Exam:    This a 89 y.o. male   Patient Vitals for the past 24 hrs:   BP Temp Temp src Pulse Resp SpO2 Height Weight   03/23/25 0800 96/78 99 °F (37.2 °C) Axillary (!) 110 18 90 % -- --   03/23/25 0630 105/61 -- -- 94 -- -- -- --   03/23/25 0515 (!) 190/50 98.4 °F (36.9 °C) Axillary 70 20 92 % -- --   03/23/25 0010 126/86 -- -- 70 20 96 % -- --   03/22/25 2358 98/65 -- -- 73 15 99 % -- --   03/22/25 2343 -- -- -- 74 15 100 % -- --   03/22/25 2328 -- -- -- 69 20 96 
[facility]    Hunger Screening     Within the past 12 months we worried whether our food would run out before we got money to buy more.: Never True     Within the past 12 months the food we bought just didn't last and we didn't have money to get more.: Never True   Transportation Needs: No Transportation Needs (2024)    [facility] [employer], [facility]    PRAPARE - Transportation     Lack of Transportation (Medical): No     Lack of Transportation (Non-Medical): No   Housing Stability: Low Risk  (2024)    [facility]    Housing Instability     Are you worried or concerned that in the next two months you may not have stable housing that you own, rent or stay in as a part of a household?: No     Review of systems: Not obtainable due to confusion.    Physical Exam:    VITALS:  BP 96/78   Pulse (!) 110   Temp 99 °F (37.2 °C) (Axillary)   Resp 18   Ht 1.778 m (5' 10\")   Wt 83.9 kg (185 lb)   SpO2 90%   BMI 26.54 kg/m²   TEMPERATURE:  Current - Temp: 99 °F (37.2 °C); Max - Temp  Av.5 °F (36.9 °C)  Min: 98.1 °F (36.7 °C)  Max: 99 °F (37.2 °C)  RESPIRATIONS RANGE: Resp  Av.8  Min: 15  Max: 25  PULSE RANGE: Pulse  Av.4  Min: 69  Max: 110  BLOOD PRESSURE RANGE:  Systolic (24hrs), Av , Min:96 , Max:190  ; Diastolic (24hrs), Av, Min:50, Max:86   PULSE OXIMETRY RANGE: SpO2  Av.2 %  Min: 90 %  Max: 100 %  24HR INTAKE/OUTPUT:    Intake/Output Summary (Last 24 hours) at 3/23/2025 1116  Last data filed at 3/23/2025 0800  Gross per 24 hour   Intake --   Output 200 ml   Net -200 ml     Constitutional: appears stated age, delirious, and distracted    Skin: Skin color, texture, turgor normal. No rashes or lesions    Head: Normocephalic, without obvious abnormality, atraumatic     ENT:   No epistaxis or rhinorrhea.    Neck:   Supple with no 
at 03/28/25 0457    norepinephrine 1 mcg/min (03/28/25 0547)    sodium chloride      dextrose       PRN Meds:oxyCODONE-acetaminophen, hydrOXYzine HCl, heparin (porcine), heparin (porcine), sodium chloride flush, sodium chloride, ondansetron **OR** ondansetron, acetaminophen **OR** acetaminophen, hydrALAZINE, ipratropium 0.5 mg-albuterol 2.5 mg, OLANZapine (ZyPREXA) 2.5 mg in sterile water 0.5 mL injection, glucose, dextrose bolus **OR** dextrose bolus, glucagon (rDNA), dextrose    SOCIAL HISTORY:     Tobacco:   reports that he has quit smoking. He has never used smokeless tobacco.  Alcohol:   reports current alcohol use.  Illicit drugs:  reports no history of drug use.    FAMILY HISTORY:     Family History   Problem Relation Age of Onset    High Blood Pressure Mother     Heart Disease Mother     Cancer Mother     Heart Disease Father     High Blood Pressure Father        REVIEW OF SYSTEMS:    Constitutional: No fever, no chills, no lethargy, no weakness.  HEENT:  No headache, otalgia, itchy eyes, nasal discharge or sore throat.  Cardiac:  No chest pain, dyspnea, orthopnea or PND.  Chest:   No cough, phlegm or wheezing.  Abdomen:  No abdominal pain, nausea or vomiting.loose stool   Neuro:  No focal weakness, abnormal movements or seizure like activity.  Skin:   No rashes, no itching.  :   No hematuria, no pyuria, no dysuria, no flank pain.  Extremities:  No swelling or joint pains.  ROS was otherwise negative except as mentioned in the [place].     PHYSICAL EXAM:    BP (!) 97/51   Pulse 80   Temp 97.4 °F (36.3 °C) (Axillary)   Resp 26   Ht 1.778 m (5' 10\")   Wt 95.3 kg (210 lb 1.6 oz)   SpO2 100%   BMI 30.15 kg/m²     GENERAL:  ill appearing  Well developed, Well nourished, No apparent distress  HEAD:   Normocephalic, Atraumatic  EENT:   EOMI, Sclera not icteric, Oropharynx moist  NECK:   Supple, Trachea midline  LUNGS:  CTA Bilaterally  HEART: 80 RRR, No murmur  ABDOMEN:   Soft, Nontender, Nondistended, BS WNL 
days: 1       Wound 03/23/25 Heel Left (Active)   Wound Image   03/24/25 1117   Wound Etiology Deep tissue/Injury 03/24/25 1117   Dressing Status Clean;Dry;Intact 03/24/25 1310   Wound Cleansed Betadine/povidone iodine 03/24/25 1117   Dressing/Treatment Roll gauze 03/24/25 1310   Offloading for Diabetic Foot Ulcers Offloading boot 03/24/25 1310   Wound Length (cm) 5.5 cm 03/24/25 1117   Wound Width (cm) 3 cm 03/24/25 1117   Wound Depth (cm) 0.1 cm 03/24/25 1117   Wound Surface Area (cm^2) 16.5 cm^2 03/24/25 1117   Wound Volume (cm^3) 1.65 cm^3 03/24/25 1117   Wound Assessment Purple/maroon 03/24/25 1117   Number of days: 1       Wound 03/24/25 Ankle Left;Medial (Active)   Wound Image   03/24/25 1117   Wound Etiology Deep tissue/Injury 03/24/25 1117   Dressing Status Dry 03/24/25 1117   Wound Cleansed Betadine/povidone iodine 03/24/25 1117   Dressing/Treatment Betadine swabs/povidone iodine 03/24/25 1117   Offloading for Diabetic Foot Ulcers Offloading boot 03/24/25 1117   Wound Length (cm) 1.3 cm 03/24/25 1117   Wound Width (cm) 2 cm 03/24/25 1117   Wound Depth (cm) 0 cm 03/24/25 1117   Wound Surface Area (cm^2) 2.6 cm^2 03/24/25 1117   Wound Volume (cm^3) 0 cm^3 03/24/25 1117   Wound Assessment Purple/maroon 03/24/25 1117   Drainage Amount None (dry) 03/24/25 1117   Odor None 03/24/25 1117   Pat-wound Assessment Blanchable erythema;Non-blanchable erythema 03/24/25 1117   Margins Attached edges 03/24/25 1117   Number of days: 0       Wound 03/24/25 Foot Left;Medial (Active)   Wound Etiology Deep tissue/Injury 03/24/25 1117   Dressing Status Dry 03/24/25 1117   Wound Cleansed Betadine/povidone iodine 03/24/25 1117   Dressing/Treatment Betadine swabs/povidone iodine 03/24/25 1117   Offloading for Diabetic Foot Ulcers Offloading boot 03/24/25 1117   Wound Length (cm) 0.5 cm 03/24/25 1117   Wound Width (cm) 1 cm 03/24/25 1117   Wound Depth (cm) 0 cm 03/24/25 1117   Wound Surface Area (cm^2) 0.5 cm^2 03/24/25 1117 
    Xray Result (most recent):  XR CHEST PORTABLE 03/24/2025    Narrative  EXAMINATION:  ONE XRAY VIEW OF THE CHEST    3/24/2025 8:15 am    COMPARISON:  03/23/2025    HISTORY:  ORDERING SYSTEM PROVIDED HISTORY: leukocytosis  TECHNOLOGIST PROVIDED HISTORY:  leukocytosis  Reason for Exam: dyspnea    FINDINGS:  Stable cardiomediastinal silhouette.  Safety pin and small rectangular  metallic radiopacity projects over the medial right lung apex.  Enteric tube  tip terminates in the distal esophagus.  Hazy left basilar opacity persists.  No focal right-sided opacity.  No pneumothorax.  No acute fracture.  Median  sternotomy.    Impression  1.  Persistent left basilar opacity which may represent atelectasis versus  infiltrate versus small layering pleural effusion.  Imaging follow-up  recommended to document resolution.    2.  Enteric tube tip terminates in the distal esophagus.  Repositioning and  radiographic follow-up recommended.       MRI Result (most recent):  No results found for this or any previous visit from the past 3650 days.      03/08/24    ECHO (TTE) COMPLETE (PRN CONTRAST/BUBBLE/STRAIN/3D) 03/11/2024  5:44 PM (Final)    Interpretation Summary    Left Ventricle: Moderately reduced left ventricular systolic function with a visually estimated EF of 30 - 35%. Left ventricle size is normal. Normal wall thickness. Global hypokinesis present. Grade I diastolic dysfunction with normal LAP.    Right Ventricle: Right ventricle is mildly dilated.    Aortic Valve: Trileaflet valve. Mildly calcified cusp. Mild stenosis of the aortic valve. Velocities may be underestimated due to reduced systolic function. AV mean gradient is 13 mmHg. AV area by continuity VTI is 1.0 cm2.    Mitral Valve: Mild regurgitation.    Tricuspid Valve: Normal RVSP. The estimated RVSP is 23 mmHg.    Left Atrium: Left atrium is mildly dilated.    Right Atrium: Right atrium is mildly dilated.    Image quality is technically difficult.    Signed by: 
routine baseline EEG    DVT ppx-heparin 5000 tid   PT/OT/ST  PM&R        Consultations:   IP CONSULT TO NEPHROLOGY  IP CONSULT TO HOSPITALIST  IP CONSULT TO UROLOGY  IP CONSULT TO PALLIATIVE CARE  IP CONSULT TO GENERAL SURGERY  IP CONSULT TO NEUROLOGY  IP CONSULT TO CARDIOLOGY  IP CONSULT TO DIETITIAN  IP CONSULT TO VASCULAR ACCESS TEAM  IP CONSULT TO DIETITIAN  IP CONSULT TO DIETITIAN        The plan was discussed with the patient, patient's family and the medical staff.      Patient is admitted as inpatient status because of co-morbidities listed above, severity of signs and symptoms as outlined, requirement for current medical therapies and most importantly because of direct risk to patient if care not provided in a hospital setting.    Ivan Ratliff MD  3/25/2025  10:59 AM    Copy sent to Dr. Metcalf, Bruno ARAUJO, APRN - CNP

## 2025-04-02 ENCOUNTER — APPOINTMENT (OUTPATIENT)
Dept: GENERAL RADIOLOGY | Age: 89
DRG: 682 | End: 2025-04-02
Payer: MEDICARE

## 2025-04-02 LAB
ANION GAP SERPL CALCULATED.3IONS-SCNC: 5 MMOL/L (ref 9–16)
BODY TEMPERATURE: 37
BODY TEMPERATURE: 37
BUN SERPL-MCNC: 15 MG/DL (ref 8–23)
CALCIUM SERPL-MCNC: 7.2 MG/DL (ref 8.6–10.4)
CHLORIDE SERPL-SCNC: 111 MMOL/L (ref 98–107)
CO2 SERPL-SCNC: 21 MMOL/L (ref 20–31)
COHGB MFR BLD: 1.7 % (ref 0–5)
COHGB MFR BLD: 2.4 % (ref 0–5)
CREAT SERPL-MCNC: 1.1 MG/DL (ref 0.7–1.2)
GAS FLOW.O2 O2 DELIVERY SYS: ABNORMAL L/MIN
GAS FLOW.O2 O2 DELIVERY SYS: ABNORMAL L/MIN
GFR, ESTIMATED: 64 ML/MIN/1.73M2
GLUCOSE BLD-MCNC: 104 MG/DL (ref 75–110)
GLUCOSE BLD-MCNC: 122 MG/DL (ref 75–110)
GLUCOSE BLD-MCNC: 128 MG/DL (ref 75–110)
GLUCOSE BLD-MCNC: 137 MG/DL (ref 75–110)
GLUCOSE BLD-MCNC: 140 MG/DL (ref 75–110)
GLUCOSE BLD-MCNC: 49 MG/DL (ref 75–110)
GLUCOSE BLD-MCNC: 52 MG/DL (ref 75–110)
GLUCOSE SERPL-MCNC: 108 MG/DL (ref 74–99)
HCO3 VENOUS: 22.5 MMOL/L (ref 24–30)
HCO3 VENOUS: 26.7 MMOL/L (ref 24–30)
METHEMOGLOBIN: 0.1 % (ref 0–1.9)
METHEMOGLOBIN: 0.2 % (ref 0–1.9)
NEGATIVE BASE EXCESS, VEN: 1.2 MMOL/L (ref 0–2)
NEGATIVE BASE EXCESS, VEN: 2 MMOL/L (ref 0–2)
O2 SAT, VEN: 51.2 % (ref 60–85)
O2 SAT, VEN: 72.1 % (ref 60–85)
PCO2 VENOUS: 37.1 MM HG (ref 39–55)
PCO2 VENOUS: 61.7 MM HG (ref 39–55)
PH VENOUS: 7.25 (ref 7.32–7.42)
PH VENOUS: 7.4 (ref 7.32–7.42)
PO2 VENOUS: 30.1 MM HG (ref 30–50)
PO2 VENOUS: 34.9 MM HG (ref 30–50)
POTASSIUM SERPL-SCNC: 4.1 MMOL/L (ref 3.7–5.3)
SODIUM SERPL-SCNC: 137 MMOL/L (ref 136–145)
SURGICAL PATHOLOGY REPORT: NORMAL
TEXT FOR RESPIRATORY: ABNORMAL
TEXT FOR RESPIRATORY: ABNORMAL

## 2025-04-02 PROCEDURE — 2580000003 HC RX 258: Performed by: INTERNAL MEDICINE

## 2025-04-02 PROCEDURE — 92526 ORAL FUNCTION THERAPY: CPT

## 2025-04-02 PROCEDURE — 2500000003 HC RX 250 WO HCPCS: Performed by: INTERNAL MEDICINE

## 2025-04-02 PROCEDURE — 82947 ASSAY GLUCOSE BLOOD QUANT: CPT

## 2025-04-02 PROCEDURE — 2060000000 HC ICU INTERMEDIATE R&B

## 2025-04-02 PROCEDURE — 99233 SBSQ HOSP IP/OBS HIGH 50: CPT | Performed by: INTERNAL MEDICINE

## 2025-04-02 PROCEDURE — 80048 BASIC METABOLIC PNL TOTAL CA: CPT

## 2025-04-02 PROCEDURE — 36415 COLL VENOUS BLD VENIPUNCTURE: CPT

## 2025-04-02 PROCEDURE — 5A09357 ASSISTANCE WITH RESPIRATORY VENTILATION, LESS THAN 24 CONSECUTIVE HOURS, CONTINUOUS POSITIVE AIRWAY PRESSURE: ICD-10-PCS | Performed by: INTERNAL MEDICINE

## 2025-04-02 PROCEDURE — 6360000002 HC RX W HCPCS: Performed by: INTERNAL MEDICINE

## 2025-04-02 PROCEDURE — 82805 BLOOD GASES W/O2 SATURATION: CPT

## 2025-04-02 PROCEDURE — 94660 CPAP INITIATION&MGMT: CPT

## 2025-04-02 PROCEDURE — 82800 BLOOD PH: CPT

## 2025-04-02 PROCEDURE — 74018 RADEX ABDOMEN 1 VIEW: CPT

## 2025-04-02 RX ORDER — METOPROLOL TARTRATE 1 MG/ML
2.5 INJECTION, SOLUTION INTRAVENOUS EVERY 6 HOURS PRN
Status: DISCONTINUED | OUTPATIENT
Start: 2025-04-02 | End: 2025-04-06 | Stop reason: HOSPADM

## 2025-04-02 RX ORDER — HYDROCORTISONE SODIUM SUCCINATE 100 MG/2ML
50 INJECTION INTRAMUSCULAR; INTRAVENOUS EVERY 8 HOURS
Status: DISCONTINUED | OUTPATIENT
Start: 2025-04-02 | End: 2025-04-03

## 2025-04-02 RX ADMIN — HEPARIN SODIUM 5000 UNITS: 5000 INJECTION INTRAVENOUS; SUBCUTANEOUS at 06:22

## 2025-04-02 RX ADMIN — HYDROCORTISONE SODIUM SUCCINATE 50 MG: 100 INJECTION, POWDER, FOR SOLUTION INTRAMUSCULAR; INTRAVENOUS at 21:19

## 2025-04-02 RX ADMIN — HYDROCORTISONE SODIUM SUCCINATE 50 MG: 100 INJECTION, POWDER, FOR SOLUTION INTRAMUSCULAR; INTRAVENOUS at 12:21

## 2025-04-02 RX ADMIN — DEXTROSE MONOHYDRATE: 100 INJECTION, SOLUTION INTRAVENOUS at 22:28

## 2025-04-02 RX ADMIN — VALPROATE SODIUM 250 MG: 100 INJECTION, SOLUTION INTRAVENOUS at 11:44

## 2025-04-02 RX ADMIN — SODIUM CHLORIDE, PRESERVATIVE FREE 10 ML: 5 INJECTION INTRAVENOUS at 21:19

## 2025-04-02 RX ADMIN — DEXTROSE MONOHYDRATE: 100 INJECTION, SOLUTION INTRAVENOUS at 12:06

## 2025-04-02 RX ADMIN — DEXTROSE MONOHYDRATE 125 ML: 100 INJECTION, SOLUTION INTRAVENOUS at 11:42

## 2025-04-02 RX ADMIN — HEPARIN SODIUM 5000 UNITS: 5000 INJECTION INTRAVENOUS; SUBCUTANEOUS at 22:10

## 2025-04-02 RX ADMIN — VALPROATE SODIUM 250 MG: 100 INJECTION, SOLUTION INTRAVENOUS at 17:02

## 2025-04-02 RX ADMIN — VALPROATE SODIUM 250 MG: 100 INJECTION, SOLUTION INTRAVENOUS at 22:59

## 2025-04-02 RX ADMIN — VALPROATE SODIUM 250 MG: 100 INJECTION, SOLUTION INTRAVENOUS at 04:53

## 2025-04-02 RX ADMIN — HEPARIN SODIUM 5000 UNITS: 5000 INJECTION INTRAVENOUS; SUBCUTANEOUS at 15:09

## 2025-04-03 ENCOUNTER — APPOINTMENT (OUTPATIENT)
Dept: GENERAL RADIOLOGY | Age: 89
DRG: 682 | End: 2025-04-03
Payer: MEDICARE

## 2025-04-03 LAB
ANION GAP SERPL CALCULATED.3IONS-SCNC: 11 MMOL/L (ref 9–16)
ANION GAP SERPL CALCULATED.3IONS-SCNC: 13 MMOL/L (ref 9–16)
BASOPHILS # BLD: 0 K/UL (ref 0–0.2)
BASOPHILS NFR BLD: 0 % (ref 0–2)
BNP SERPL-MCNC: ABNORMAL PG/ML (ref 0–300)
BNP SERPL-MCNC: ABNORMAL PG/ML (ref 0–300)
BUN SERPL-MCNC: 13 MG/DL (ref 8–23)
BUN SERPL-MCNC: 14 MG/DL (ref 8–23)
CALCIUM SERPL-MCNC: 8.3 MG/DL (ref 8.6–10.4)
CALCIUM SERPL-MCNC: 8.6 MG/DL (ref 8.6–10.4)
CHLORIDE SERPL-SCNC: 105 MMOL/L (ref 98–107)
CHLORIDE SERPL-SCNC: 106 MMOL/L (ref 98–107)
CO2 SERPL-SCNC: 21 MMOL/L (ref 20–31)
CO2 SERPL-SCNC: 21 MMOL/L (ref 20–31)
CREAT SERPL-MCNC: 1.2 MG/DL (ref 0.7–1.2)
CREAT SERPL-MCNC: 1.3 MG/DL (ref 0.7–1.2)
EOSINOPHIL # BLD: 0 K/UL (ref 0–0.4)
EOSINOPHILS RELATIVE PERCENT: 0 % (ref 0–4)
ERYTHROCYTE [DISTWIDTH] IN BLOOD BY AUTOMATED COUNT: 15.7 % (ref 11.5–14.9)
GFR, ESTIMATED: 53 ML/MIN/1.73M2
GFR, ESTIMATED: 58 ML/MIN/1.73M2
GLUCOSE BLD-MCNC: 123 MG/DL (ref 75–110)
GLUCOSE BLD-MCNC: 125 MG/DL (ref 75–110)
GLUCOSE BLD-MCNC: 35 MG/DL (ref 75–110)
GLUCOSE BLD-MCNC: 53 MG/DL (ref 75–110)
GLUCOSE BLD-MCNC: 63 MG/DL (ref 75–110)
GLUCOSE BLD-MCNC: 68 MG/DL (ref 75–110)
GLUCOSE BLD-MCNC: 68 MG/DL (ref 75–110)
GLUCOSE BLD-MCNC: 71 MG/DL (ref 75–110)
GLUCOSE BLD-MCNC: 83 MG/DL (ref 75–110)
GLUCOSE SERPL-MCNC: 130 MG/DL (ref 74–99)
GLUCOSE SERPL-MCNC: 145 MG/DL (ref 74–99)
HCT VFR BLD AUTO: 27.7 % (ref 41–53)
HGB BLD-MCNC: 9 G/DL (ref 13.5–17.5)
LYMPHOCYTES NFR BLD: 0.86 K/UL (ref 1–4.8)
LYMPHOCYTES RELATIVE PERCENT: 13 % (ref 24–44)
MCH RBC QN AUTO: 30.8 PG (ref 26–34)
MCHC RBC AUTO-ENTMCNC: 32.6 G/DL (ref 31–37)
MCV RBC AUTO: 94.2 FL (ref 80–100)
METAMYELOCYTES ABSOLUTE COUNT: 0.13 K/UL
METAMYELOCYTES: 2 %
MONOCYTES NFR BLD: 0.2 K/UL (ref 0.1–1.3)
MONOCYTES NFR BLD: 3 % (ref 1–7)
MORPHOLOGY: ABNORMAL
MYELOCYTES ABSOLUTE COUNT: 0.13 K/UL
MYELOCYTES: 2 %
NEUTROPHILS NFR BLD: 80 % (ref 36–66)
NEUTS SEG NFR BLD: 5.28 K/UL (ref 1.3–9.1)
PLATELET # BLD AUTO: 119 K/UL (ref 150–450)
PMV BLD AUTO: 8 FL (ref 6–12)
POTASSIUM SERPL-SCNC: 4 MMOL/L (ref 3.7–5.3)
POTASSIUM SERPL-SCNC: 4.1 MMOL/L (ref 3.7–5.3)
RBC # BLD AUTO: 2.94 M/UL (ref 4.5–5.9)
SODIUM SERPL-SCNC: 138 MMOL/L (ref 136–145)
SODIUM SERPL-SCNC: 139 MMOL/L (ref 136–145)
T4 FREE SERPL-MCNC: 1 NG/DL (ref 0.9–1.7)
TSH SERPL DL<=0.05 MIU/L-ACNC: 7.93 UIU/ML (ref 0.27–4.2)
WBC OTHER # BLD: 6.6 K/UL (ref 3.5–11)

## 2025-04-03 PROCEDURE — 94660 CPAP INITIATION&MGMT: CPT

## 2025-04-03 PROCEDURE — 6370000000 HC RX 637 (ALT 250 FOR IP): Performed by: INTERNAL MEDICINE

## 2025-04-03 PROCEDURE — 71045 X-RAY EXAM CHEST 1 VIEW: CPT

## 2025-04-03 PROCEDURE — 6360000002 HC RX W HCPCS: Performed by: INTERNAL MEDICINE

## 2025-04-03 PROCEDURE — 94761 N-INVAS EAR/PLS OXIMETRY MLT: CPT

## 2025-04-03 PROCEDURE — 93005 ELECTROCARDIOGRAM TRACING: CPT | Performed by: INTERNAL MEDICINE

## 2025-04-03 PROCEDURE — 2580000003 HC RX 258: Performed by: INTERNAL MEDICINE

## 2025-04-03 PROCEDURE — 84439 ASSAY OF FREE THYROXINE: CPT

## 2025-04-03 PROCEDURE — 84481 FREE ASSAY (FT-3): CPT

## 2025-04-03 PROCEDURE — 2500000003 HC RX 250 WO HCPCS: Performed by: INTERNAL MEDICINE

## 2025-04-03 PROCEDURE — 2000000000 HC ICU R&B

## 2025-04-03 PROCEDURE — 85025 COMPLETE CBC W/AUTO DIFF WBC: CPT

## 2025-04-03 PROCEDURE — 82947 ASSAY GLUCOSE BLOOD QUANT: CPT

## 2025-04-03 PROCEDURE — 36415 COLL VENOUS BLD VENIPUNCTURE: CPT

## 2025-04-03 PROCEDURE — 99233 SBSQ HOSP IP/OBS HIGH 50: CPT

## 2025-04-03 PROCEDURE — 84443 ASSAY THYROID STIM HORMONE: CPT

## 2025-04-03 PROCEDURE — 83880 ASSAY OF NATRIURETIC PEPTIDE: CPT

## 2025-04-03 PROCEDURE — 6370000000 HC RX 637 (ALT 250 FOR IP): Performed by: NURSE PRACTITIONER

## 2025-04-03 PROCEDURE — 92526 ORAL FUNCTION THERAPY: CPT

## 2025-04-03 PROCEDURE — 2700000000 HC OXYGEN THERAPY PER DAY

## 2025-04-03 PROCEDURE — 80048 BASIC METABOLIC PNL TOTAL CA: CPT

## 2025-04-03 RX ORDER — METOPROLOL TARTRATE 25 MG/1
37.5 TABLET, FILM COATED ORAL 2 TIMES DAILY
Status: DISCONTINUED | OUTPATIENT
Start: 2025-04-04 | End: 2025-04-06 | Stop reason: HOSPADM

## 2025-04-03 RX ORDER — FUROSEMIDE 10 MG/ML
40 INJECTION INTRAMUSCULAR; INTRAVENOUS ONCE
Status: DISCONTINUED | OUTPATIENT
Start: 2025-04-03 | End: 2025-04-05

## 2025-04-03 RX ORDER — NOREPINEPHRINE BITARTRATE 0.06 MG/ML
1-30 INJECTION, SOLUTION INTRAVENOUS CONTINUOUS
Status: DISCONTINUED | OUTPATIENT
Start: 2025-04-03 | End: 2025-04-06

## 2025-04-03 RX ORDER — 0.9 % SODIUM CHLORIDE 0.9 %
500 INTRAVENOUS SOLUTION INTRAVENOUS ONCE
Status: COMPLETED | OUTPATIENT
Start: 2025-04-03 | End: 2025-04-03

## 2025-04-03 RX ORDER — HYDROCORTISONE SODIUM SUCCINATE 100 MG/2ML
25 INJECTION INTRAMUSCULAR; INTRAVENOUS EVERY 8 HOURS
Status: DISCONTINUED | OUTPATIENT
Start: 2025-04-03 | End: 2025-04-06 | Stop reason: HOSPADM

## 2025-04-03 RX ORDER — METOPROLOL TARTRATE 25 MG/1
12.5 TABLET, FILM COATED ORAL ONCE
Status: COMPLETED | OUTPATIENT
Start: 2025-04-03 | End: 2025-04-03

## 2025-04-03 RX ADMIN — HYDROCORTISONE SODIUM SUCCINATE 25 MG: 100 INJECTION, POWDER, FOR SOLUTION INTRAMUSCULAR; INTRAVENOUS at 12:48

## 2025-04-03 RX ADMIN — VENLAFAXINE 37.5 MG: 37.5 TABLET ORAL at 22:33

## 2025-04-03 RX ADMIN — TAMSULOSIN HYDROCHLORIDE 0.4 MG: 0.4 CAPSULE ORAL at 22:29

## 2025-04-03 RX ADMIN — METOPROLOL TARTRATE 2.5 MG: 1 INJECTION, SOLUTION INTRAVENOUS at 16:05

## 2025-04-03 RX ADMIN — SODIUM CHLORIDE, PRESERVATIVE FREE 10 ML: 5 INJECTION INTRAVENOUS at 22:33

## 2025-04-03 RX ADMIN — MIRTAZAPINE 7.5 MG: 15 TABLET, FILM COATED ORAL at 22:36

## 2025-04-03 RX ADMIN — HYDROCORTISONE SODIUM SUCCINATE 25 MG: 100 INJECTION, POWDER, FOR SOLUTION INTRAMUSCULAR; INTRAVENOUS at 20:39

## 2025-04-03 RX ADMIN — GLUCAGON 1 MG: KIT at 20:14

## 2025-04-03 RX ADMIN — MIDODRINE HYDROCHLORIDE 10 MG: 10 TABLET ORAL at 19:46

## 2025-04-03 RX ADMIN — HEPARIN SODIUM 5000 UNITS: 5000 INJECTION INTRAVENOUS; SUBCUTANEOUS at 15:31

## 2025-04-03 RX ADMIN — METOPROLOL TARTRATE 12.5 MG: 25 TABLET, FILM COATED ORAL at 20:39

## 2025-04-03 RX ADMIN — VALPROATE SODIUM 250 MG: 100 INJECTION, SOLUTION INTRAVENOUS at 04:31

## 2025-04-03 RX ADMIN — SODIUM CHLORIDE, PRESERVATIVE FREE 10 ML: 5 INJECTION INTRAVENOUS at 09:34

## 2025-04-03 RX ADMIN — POLYETHYLENE GLYCOL 3350 17 G: 17 POWDER, FOR SOLUTION ORAL at 22:33

## 2025-04-03 RX ADMIN — Medication 5 MCG/MIN: at 22:43

## 2025-04-03 RX ADMIN — SODIUM CHLORIDE 500 ML: 0.9 INJECTION, SOLUTION INTRAVENOUS at 00:18

## 2025-04-03 RX ADMIN — DEXTROSE MONOHYDRATE 125 ML: 100 INJECTION, SOLUTION INTRAVENOUS at 18:46

## 2025-04-03 RX ADMIN — VALPROATE SODIUM 250 MG: 100 INJECTION, SOLUTION INTRAVENOUS at 22:53

## 2025-04-03 RX ADMIN — HEPARIN SODIUM 5000 UNITS: 5000 INJECTION INTRAVENOUS; SUBCUTANEOUS at 05:29

## 2025-04-03 RX ADMIN — DEXTROSE MONOHYDRATE 125 ML: 100 INJECTION, SOLUTION INTRAVENOUS at 16:07

## 2025-04-03 RX ADMIN — HYDROCORTISONE SODIUM SUCCINATE 50 MG: 100 INJECTION, POWDER, FOR SOLUTION INTRAMUSCULAR; INTRAVENOUS at 03:06

## 2025-04-03 RX ADMIN — HEPARIN SODIUM 5000 UNITS: 5000 INJECTION INTRAVENOUS; SUBCUTANEOUS at 22:29

## 2025-04-03 RX ADMIN — METOPROLOL TARTRATE 25 MG: 25 TABLET, FILM COATED ORAL at 18:28

## 2025-04-03 RX ADMIN — VALPROATE SODIUM 250 MG: 100 INJECTION, SOLUTION INTRAVENOUS at 17:34

## 2025-04-03 RX ADMIN — MIDODRINE HYDROCHLORIDE 10 MG: 10 TABLET ORAL at 16:15

## 2025-04-03 RX ADMIN — VALPROATE SODIUM 250 MG: 100 INJECTION, SOLUTION INTRAVENOUS at 10:47

## 2025-04-03 ASSESSMENT — PAIN SCALES - WONG BAKER: WONGBAKER_NUMERICALRESPONSE: NO HURT

## 2025-04-03 ASSESSMENT — PAIN SCALES - GENERAL: PAINLEVEL_OUTOF10: 0

## 2025-04-04 LAB
ANION GAP SERPL CALCULATED.3IONS-SCNC: 10 MMOL/L (ref 9–16)
BUN SERPL-MCNC: 18 MG/DL (ref 8–23)
CALCIUM SERPL-MCNC: 8.3 MG/DL (ref 8.6–10.4)
CHLORIDE SERPL-SCNC: 106 MMOL/L (ref 98–107)
CHLORIDE UR-SCNC: 26 MMOL/L
CO2 SERPL-SCNC: 21 MMOL/L (ref 20–31)
CREAT SERPL-MCNC: 1.4 MG/DL (ref 0.7–1.2)
EKG ATRIAL RATE: 127 BPM
EKG P-R INTERVAL: 152 MS
EKG Q-T INTERVAL: 330 MS
EKG QRS DURATION: 104 MS
EKG QTC CALCULATION (BAZETT): 479 MS
EKG R AXIS: 12 DEGREES
EKG T AXIS: 147 DEGREES
EKG VENTRICULAR RATE: 127 BPM
GFR, ESTIMATED: 48 ML/MIN/1.73M2
GLUCOSE BLD-MCNC: 115 MG/DL (ref 75–110)
GLUCOSE BLD-MCNC: 65 MG/DL (ref 75–110)
GLUCOSE BLD-MCNC: 74 MG/DL (ref 75–110)
GLUCOSE BLD-MCNC: 78 MG/DL (ref 75–110)
GLUCOSE BLD-MCNC: 98 MG/DL (ref 75–110)
GLUCOSE SERPL-MCNC: 111 MG/DL (ref 74–99)
POTASSIUM SERPL-SCNC: 4 MMOL/L (ref 3.7–5.3)
PROCALCITONIN SERPL-MCNC: 1.04 NG/ML (ref 0–0.09)
SODIUM SERPL-SCNC: 137 MMOL/L (ref 136–145)
T3FREE SERPL-MCNC: 2.18 PG/ML (ref 2–4.4)

## 2025-04-04 PROCEDURE — 6370000000 HC RX 637 (ALT 250 FOR IP): Performed by: INTERNAL MEDICINE

## 2025-04-04 PROCEDURE — 36415 COLL VENOUS BLD VENIPUNCTURE: CPT

## 2025-04-04 PROCEDURE — 94761 N-INVAS EAR/PLS OXIMETRY MLT: CPT

## 2025-04-04 PROCEDURE — 2700000000 HC OXYGEN THERAPY PER DAY

## 2025-04-04 PROCEDURE — 2000000000 HC ICU R&B

## 2025-04-04 PROCEDURE — 2500000003 HC RX 250 WO HCPCS: Performed by: INTERNAL MEDICINE

## 2025-04-04 PROCEDURE — 82436 ASSAY OF URINE CHLORIDE: CPT

## 2025-04-04 PROCEDURE — 94660 CPAP INITIATION&MGMT: CPT

## 2025-04-04 PROCEDURE — 82947 ASSAY GLUCOSE BLOOD QUANT: CPT

## 2025-04-04 PROCEDURE — 93010 ELECTROCARDIOGRAM REPORT: CPT | Performed by: INTERNAL MEDICINE

## 2025-04-04 PROCEDURE — 99233 SBSQ HOSP IP/OBS HIGH 50: CPT | Performed by: INTERNAL MEDICINE

## 2025-04-04 PROCEDURE — 84145 PROCALCITONIN (PCT): CPT

## 2025-04-04 PROCEDURE — 6360000002 HC RX W HCPCS: Performed by: INTERNAL MEDICINE

## 2025-04-04 PROCEDURE — 2580000003 HC RX 258: Performed by: INTERNAL MEDICINE

## 2025-04-04 PROCEDURE — 6370000000 HC RX 637 (ALT 250 FOR IP): Performed by: NURSE PRACTITIONER

## 2025-04-04 PROCEDURE — 99233 SBSQ HOSP IP/OBS HIGH 50: CPT | Performed by: NURSE PRACTITIONER

## 2025-04-04 PROCEDURE — 51798 US URINE CAPACITY MEASURE: CPT

## 2025-04-04 PROCEDURE — 80048 BASIC METABOLIC PNL TOTAL CA: CPT

## 2025-04-04 PROCEDURE — 6360000002 HC RX W HCPCS

## 2025-04-04 RX ORDER — FLUDROCORTISONE ACETATE 0.1 MG/1
0.1 TABLET ORAL DAILY
Status: DISCONTINUED | OUTPATIENT
Start: 2025-04-04 | End: 2025-04-06 | Stop reason: HOSPADM

## 2025-04-04 RX ORDER — FUROSEMIDE 10 MG/ML
40 INJECTION INTRAMUSCULAR; INTRAVENOUS ONCE
Status: COMPLETED | OUTPATIENT
Start: 2025-04-04 | End: 2025-04-04

## 2025-04-04 RX ORDER — SODIUM CHLORIDE 9 MG/ML
INJECTION, SOLUTION INTRAVENOUS CONTINUOUS
Status: DISCONTINUED | OUTPATIENT
Start: 2025-04-04 | End: 2025-04-06 | Stop reason: HOSPADM

## 2025-04-04 RX ADMIN — VALPROATE SODIUM 250 MG: 100 INJECTION, SOLUTION INTRAVENOUS at 16:42

## 2025-04-04 RX ADMIN — MIDODRINE HYDROCHLORIDE 15 MG: 10 TABLET ORAL at 08:37

## 2025-04-04 RX ADMIN — MIDODRINE HYDROCHLORIDE 15 MG: 10 TABLET ORAL at 17:14

## 2025-04-04 RX ADMIN — POLYETHYLENE GLYCOL 3350 17 G: 17 POWDER, FOR SOLUTION ORAL at 20:34

## 2025-04-04 RX ADMIN — VENLAFAXINE 37.5 MG: 37.5 TABLET ORAL at 20:36

## 2025-04-04 RX ADMIN — HEPARIN SODIUM 5000 UNITS: 5000 INJECTION INTRAVENOUS; SUBCUTANEOUS at 13:59

## 2025-04-04 RX ADMIN — HEPARIN SODIUM 5000 UNITS: 5000 INJECTION INTRAVENOUS; SUBCUTANEOUS at 05:46

## 2025-04-04 RX ADMIN — SODIUM CHLORIDE, PRESERVATIVE FREE 10 ML: 5 INJECTION INTRAVENOUS at 20:34

## 2025-04-04 RX ADMIN — SODIUM CHLORIDE: 900 INJECTION, SOLUTION INTRAVENOUS at 10:22

## 2025-04-04 RX ADMIN — HYDROCORTISONE SODIUM SUCCINATE 25 MG: 100 INJECTION, POWDER, FOR SOLUTION INTRAMUSCULAR; INTRAVENOUS at 20:34

## 2025-04-04 RX ADMIN — METOPROLOL TARTRATE 37.5 MG: 25 TABLET, FILM COATED ORAL at 20:33

## 2025-04-04 RX ADMIN — POLYETHYLENE GLYCOL 3350 17 G: 17 POWDER, FOR SOLUTION ORAL at 08:37

## 2025-04-04 RX ADMIN — METOPROLOL TARTRATE 37.5 MG: 25 TABLET, FILM COATED ORAL at 08:37

## 2025-04-04 RX ADMIN — LAMOTRIGINE 25 MG: 25 TABLET ORAL at 08:37

## 2025-04-04 RX ADMIN — FUROSEMIDE 40 MG: 10 INJECTION, SOLUTION INTRAMUSCULAR; INTRAVENOUS at 00:42

## 2025-04-04 RX ADMIN — HYDROCORTISONE SODIUM SUCCINATE 25 MG: 100 INJECTION, POWDER, FOR SOLUTION INTRAMUSCULAR; INTRAVENOUS at 10:23

## 2025-04-04 RX ADMIN — VALPROATE SODIUM 250 MG: 100 INJECTION, SOLUTION INTRAVENOUS at 11:23

## 2025-04-04 RX ADMIN — MIDODRINE HYDROCHLORIDE 15 MG: 10 TABLET ORAL at 10:23

## 2025-04-04 RX ADMIN — TAMSULOSIN HYDROCHLORIDE 0.4 MG: 0.4 CAPSULE ORAL at 20:34

## 2025-04-04 RX ADMIN — VENLAFAXINE 37.5 MG: 37.5 TABLET ORAL at 08:37

## 2025-04-04 RX ADMIN — HEPARIN SODIUM 5000 UNITS: 5000 INJECTION INTRAVENOUS; SUBCUTANEOUS at 20:34

## 2025-04-04 RX ADMIN — VALPROATE SODIUM 250 MG: 100 INJECTION, SOLUTION INTRAVENOUS at 22:24

## 2025-04-04 RX ADMIN — MIRTAZAPINE 7.5 MG: 15 TABLET, FILM COATED ORAL at 20:34

## 2025-04-04 RX ADMIN — VALPROATE SODIUM 250 MG: 100 INJECTION, SOLUTION INTRAVENOUS at 04:41

## 2025-04-04 RX ADMIN — FLUDROCORTISONE ACETATE 0.1 MG: 0.1 TABLET ORAL at 10:23

## 2025-04-04 RX ADMIN — HYDROCORTISONE SODIUM SUCCINATE 25 MG: 100 INJECTION, POWDER, FOR SOLUTION INTRAMUSCULAR; INTRAVENOUS at 04:37

## 2025-04-04 RX ADMIN — SODIUM CHLORIDE, PRESERVATIVE FREE 10 ML: 5 INJECTION INTRAVENOUS at 08:43

## 2025-04-04 ASSESSMENT — PAIN SCALES - WONG BAKER
WONGBAKER_NUMERICALRESPONSE: NO HURT
WONGBAKER_NUMERICALRESPONSE: NO HURT

## 2025-04-04 ASSESSMENT — PAIN SCALES - GENERAL
PAINLEVEL_OUTOF10: 0

## 2025-04-05 LAB
ABSOLUTE BANDS: 0.07 K/UL (ref 0–1)
ANION GAP SERPL CALCULATED.3IONS-SCNC: 11 MMOL/L (ref 9–16)
BANDS: 1 % (ref 0–10)
BASOPHILS # BLD: 0 K/UL (ref 0–0.2)
BASOPHILS NFR BLD: 0 % (ref 0–2)
BUN SERPL-MCNC: 25 MG/DL (ref 8–23)
CALCIUM SERPL-MCNC: 8 MG/DL (ref 8.6–10.4)
CHLORIDE SERPL-SCNC: 108 MMOL/L (ref 98–107)
CO2 SERPL-SCNC: 20 MMOL/L (ref 20–31)
CREAT SERPL-MCNC: 1.6 MG/DL (ref 0.7–1.2)
EOSINOPHIL # BLD: 0 K/UL (ref 0–0.4)
EOSINOPHILS RELATIVE PERCENT: 0 % (ref 0–4)
ERYTHROCYTE [DISTWIDTH] IN BLOOD BY AUTOMATED COUNT: 16.1 % (ref 11.5–14.9)
GFR, ESTIMATED: 41 ML/MIN/1.73M2
GLUCOSE BLD-MCNC: 71 MG/DL (ref 75–110)
GLUCOSE BLD-MCNC: 76 MG/DL (ref 75–110)
GLUCOSE BLD-MCNC: 79 MG/DL (ref 75–110)
GLUCOSE SERPL-MCNC: 84 MG/DL (ref 74–99)
HCT VFR BLD AUTO: 22.7 % (ref 41–53)
HGB BLD-MCNC: 7.4 G/DL (ref 13.5–17.5)
LYMPHOCYTES NFR BLD: 1.24 K/UL (ref 1–4.8)
LYMPHOCYTES RELATIVE PERCENT: 17 % (ref 24–44)
MCH RBC QN AUTO: 31.2 PG (ref 26–34)
MCHC RBC AUTO-ENTMCNC: 32.8 G/DL (ref 31–37)
MCV RBC AUTO: 95.3 FL (ref 80–100)
METAMYELOCYTES ABSOLUTE COUNT: 0.15 K/UL
METAMYELOCYTES: 2 %
MONOCYTES NFR BLD: 0.22 K/UL (ref 0.1–1.3)
MONOCYTES NFR BLD: 3 % (ref 1–7)
MORPHOLOGY: ABNORMAL
NEUTROPHILS NFR BLD: 77 % (ref 36–66)
NEUTS SEG NFR BLD: 5.62 K/UL (ref 1.3–9.1)
PLATELET # BLD AUTO: 243 K/UL (ref 150–450)
PMV BLD AUTO: 7.5 FL (ref 6–12)
POTASSIUM SERPL-SCNC: 4.1 MMOL/L (ref 3.7–5.3)
RBC # BLD AUTO: 2.38 M/UL (ref 4.5–5.9)
SODIUM SERPL-SCNC: 139 MMOL/L (ref 136–145)
WBC OTHER # BLD: 7.3 K/UL (ref 3.5–11)

## 2025-04-05 PROCEDURE — 6360000002 HC RX W HCPCS: Performed by: INTERNAL MEDICINE

## 2025-04-05 PROCEDURE — 51798 US URINE CAPACITY MEASURE: CPT

## 2025-04-05 PROCEDURE — 6370000000 HC RX 637 (ALT 250 FOR IP): Performed by: INTERNAL MEDICINE

## 2025-04-05 PROCEDURE — 94761 N-INVAS EAR/PLS OXIMETRY MLT: CPT

## 2025-04-05 PROCEDURE — 6370000000 HC RX 637 (ALT 250 FOR IP): Performed by: NURSE PRACTITIONER

## 2025-04-05 PROCEDURE — 2500000003 HC RX 250 WO HCPCS: Performed by: INTERNAL MEDICINE

## 2025-04-05 PROCEDURE — 99233 SBSQ HOSP IP/OBS HIGH 50: CPT | Performed by: NURSE PRACTITIONER

## 2025-04-05 PROCEDURE — 2580000003 HC RX 258: Performed by: INTERNAL MEDICINE

## 2025-04-05 PROCEDURE — 82947 ASSAY GLUCOSE BLOOD QUANT: CPT

## 2025-04-05 PROCEDURE — P9047 ALBUMIN (HUMAN), 25%, 50ML: HCPCS | Performed by: INTERNAL MEDICINE

## 2025-04-05 PROCEDURE — 94660 CPAP INITIATION&MGMT: CPT

## 2025-04-05 PROCEDURE — 2700000000 HC OXYGEN THERAPY PER DAY

## 2025-04-05 PROCEDURE — 85025 COMPLETE CBC W/AUTO DIFF WBC: CPT

## 2025-04-05 PROCEDURE — 99233 SBSQ HOSP IP/OBS HIGH 50: CPT | Performed by: INTERNAL MEDICINE

## 2025-04-05 PROCEDURE — 36415 COLL VENOUS BLD VENIPUNCTURE: CPT

## 2025-04-05 PROCEDURE — 80048 BASIC METABOLIC PNL TOTAL CA: CPT

## 2025-04-05 PROCEDURE — 1200000000 HC SEMI PRIVATE

## 2025-04-05 RX ORDER — MORPHINE SULFATE 2 MG/ML
2 INJECTION, SOLUTION INTRAMUSCULAR; INTRAVENOUS
Status: DISCONTINUED | OUTPATIENT
Start: 2025-04-05 | End: 2025-04-06 | Stop reason: HOSPADM

## 2025-04-05 RX ORDER — FUROSEMIDE 10 MG/ML
20 INJECTION INTRAMUSCULAR; INTRAVENOUS ONCE
Status: COMPLETED | OUTPATIENT
Start: 2025-04-05 | End: 2025-04-05

## 2025-04-05 RX ORDER — LORAZEPAM 2 MG/ML
2 INJECTION INTRAMUSCULAR
Status: DISCONTINUED | OUTPATIENT
Start: 2025-04-05 | End: 2025-04-06 | Stop reason: HOSPADM

## 2025-04-05 RX ORDER — ALBUMIN (HUMAN) 12.5 G/50ML
25 SOLUTION INTRAVENOUS EVERY 12 HOURS
Status: DISCONTINUED | OUTPATIENT
Start: 2025-04-05 | End: 2025-04-06 | Stop reason: HOSPADM

## 2025-04-05 RX ADMIN — POLYETHYLENE GLYCOL 3350 17 G: 17 POWDER, FOR SOLUTION ORAL at 07:20

## 2025-04-05 RX ADMIN — SODIUM CHLORIDE, PRESERVATIVE FREE 10 ML: 5 INJECTION INTRAVENOUS at 20:25

## 2025-04-05 RX ADMIN — TAMSULOSIN HYDROCHLORIDE 0.4 MG: 0.4 CAPSULE ORAL at 20:24

## 2025-04-05 RX ADMIN — VENLAFAXINE 37.5 MG: 37.5 TABLET ORAL at 10:29

## 2025-04-05 RX ADMIN — SODIUM CHLORIDE: 900 INJECTION, SOLUTION INTRAVENOUS at 19:56

## 2025-04-05 RX ADMIN — HYDROCORTISONE SODIUM SUCCINATE 25 MG: 100 INJECTION, POWDER, FOR SOLUTION INTRAMUSCULAR; INTRAVENOUS at 12:13

## 2025-04-05 RX ADMIN — SODIUM CHLORIDE, PRESERVATIVE FREE 10 ML: 5 INJECTION INTRAVENOUS at 07:03

## 2025-04-05 RX ADMIN — DEXTROSE MONOHYDRATE 125 ML: 100 INJECTION, SOLUTION INTRAVENOUS at 00:06

## 2025-04-05 RX ADMIN — VENLAFAXINE 37.5 MG: 37.5 TABLET ORAL at 22:34

## 2025-04-05 RX ADMIN — HYDROCORTISONE SODIUM SUCCINATE 25 MG: 100 INJECTION, POWDER, FOR SOLUTION INTRAMUSCULAR; INTRAVENOUS at 04:41

## 2025-04-05 RX ADMIN — MIDODRINE HYDROCHLORIDE 15 MG: 10 TABLET ORAL at 16:18

## 2025-04-05 RX ADMIN — VALPROATE SODIUM 250 MG: 100 INJECTION, SOLUTION INTRAVENOUS at 10:33

## 2025-04-05 RX ADMIN — MIRTAZAPINE 7.5 MG: 15 TABLET, FILM COATED ORAL at 20:24

## 2025-04-05 RX ADMIN — HEPARIN SODIUM 5000 UNITS: 5000 INJECTION INTRAVENOUS; SUBCUTANEOUS at 22:14

## 2025-04-05 RX ADMIN — VALPROATE SODIUM 250 MG: 100 INJECTION, SOLUTION INTRAVENOUS at 04:41

## 2025-04-05 RX ADMIN — HEPARIN SODIUM 5000 UNITS: 5000 INJECTION INTRAVENOUS; SUBCUTANEOUS at 04:41

## 2025-04-05 RX ADMIN — SODIUM CHLORIDE: 900 INJECTION, SOLUTION INTRAVENOUS at 03:06

## 2025-04-05 RX ADMIN — FUROSEMIDE 20 MG: 10 INJECTION, SOLUTION INTRAMUSCULAR; INTRAVENOUS at 16:14

## 2025-04-05 RX ADMIN — VALPROATE SODIUM 250 MG: 100 INJECTION, SOLUTION INTRAVENOUS at 22:35

## 2025-04-05 RX ADMIN — HYDROCORTISONE SODIUM SUCCINATE 25 MG: 100 INJECTION, POWDER, FOR SOLUTION INTRAMUSCULAR; INTRAVENOUS at 20:24

## 2025-04-05 RX ADMIN — MIDODRINE HYDROCHLORIDE 15 MG: 10 TABLET ORAL at 10:29

## 2025-04-05 RX ADMIN — LAMOTRIGINE 25 MG: 25 TABLET ORAL at 10:29

## 2025-04-05 RX ADMIN — FLUDROCORTISONE ACETATE 0.1 MG: 0.1 TABLET ORAL at 10:29

## 2025-04-05 RX ADMIN — ALBUMIN (HUMAN) 25 G: 0.25 INJECTION, SOLUTION INTRAVENOUS at 16:18

## 2025-04-05 RX ADMIN — VALPROATE SODIUM 250 MG: 100 INJECTION, SOLUTION INTRAVENOUS at 17:29

## 2025-04-05 NOTE — SIGNIFICANT EVENT
Called and spoke with Niece Marianne via phone.     Updated on pts condition. Encouraged conversation with other family members to discuss what his wishes are.      Yadira with palliative care is following.      Family would like to discuss with nephrology regarding their prognosis and recommendations.      Advised Marianne to ask RN to page me with further questions or concerns.

## 2025-04-06 VITALS
OXYGEN SATURATION: 75 % | TEMPERATURE: 97.2 F | RESPIRATION RATE: 16 BRPM | BODY MASS INDEX: 33.77 KG/M2 | DIASTOLIC BLOOD PRESSURE: 49 MMHG | HEIGHT: 70 IN | HEART RATE: 67 BPM | SYSTOLIC BLOOD PRESSURE: 103 MMHG | WEIGHT: 235.89 LBS

## 2025-04-06 LAB
ANION GAP SERPL CALCULATED.3IONS-SCNC: 11 MMOL/L (ref 9–16)
BASOPHILS # BLD: 0 K/UL (ref 0–0.2)
BASOPHILS NFR BLD: 0 % (ref 0–2)
BUN SERPL-MCNC: 24 MG/DL (ref 8–23)
CALCIUM SERPL-MCNC: 7.5 MG/DL (ref 8.6–10.4)
CHLORIDE SERPL-SCNC: 113 MMOL/L (ref 98–107)
CO2 SERPL-SCNC: 18 MMOL/L (ref 20–31)
CREAT SERPL-MCNC: 1.5 MG/DL (ref 0.7–1.2)
EOSINOPHIL # BLD: 0 K/UL (ref 0–0.4)
EOSINOPHILS RELATIVE PERCENT: 0 % (ref 0–4)
ERYTHROCYTE [DISTWIDTH] IN BLOOD BY AUTOMATED COUNT: 17.1 % (ref 11.5–14.9)
GFR, ESTIMATED: 44 ML/MIN/1.73M2
GLUCOSE BLD-MCNC: 77 MG/DL (ref 75–110)
GLUCOSE SERPL-MCNC: 61 MG/DL (ref 74–99)
HCT VFR BLD AUTO: 24.1 % (ref 41–53)
HGB BLD-MCNC: 7.5 G/DL (ref 13.5–17.5)
LYMPHOCYTES NFR BLD: 1.84 K/UL (ref 1–4.8)
LYMPHOCYTES RELATIVE PERCENT: 34 % (ref 24–44)
MCH RBC QN AUTO: 31.3 PG (ref 26–34)
MCHC RBC AUTO-ENTMCNC: 30.9 G/DL (ref 31–37)
MCV RBC AUTO: 101.1 FL (ref 80–100)
METAMYELOCYTES ABSOLUTE COUNT: 0.11 K/UL
METAMYELOCYTES: 2 %
MONOCYTES NFR BLD: 0.22 K/UL (ref 0.1–1.3)
MONOCYTES NFR BLD: 4 % (ref 1–7)
MORPHOLOGY: ABNORMAL
MYELOCYTES ABSOLUTE COUNT: 0.22 K/UL
MYELOCYTES: 4 %
NEUTROPHILS NFR BLD: 56 % (ref 36–66)
NEUTS SEG NFR BLD: 3.01 K/UL (ref 1.3–9.1)
PLATELET # BLD AUTO: 213 K/UL (ref 150–450)
PMV BLD AUTO: 7.4 FL (ref 6–12)
POTASSIUM SERPL-SCNC: 3.7 MMOL/L (ref 3.7–5.3)
RBC # BLD AUTO: 2.39 M/UL (ref 4.5–5.9)
SODIUM SERPL-SCNC: 142 MMOL/L (ref 136–145)
WBC OTHER # BLD: 5.4 K/UL (ref 3.5–11)

## 2025-04-06 PROCEDURE — 6370000000 HC RX 637 (ALT 250 FOR IP): Performed by: NURSE PRACTITIONER

## 2025-04-06 PROCEDURE — 82947 ASSAY GLUCOSE BLOOD QUANT: CPT

## 2025-04-06 PROCEDURE — 2580000003 HC RX 258: Performed by: INTERNAL MEDICINE

## 2025-04-06 PROCEDURE — 6370000000 HC RX 637 (ALT 250 FOR IP): Performed by: INTERNAL MEDICINE

## 2025-04-06 PROCEDURE — 85025 COMPLETE CBC W/AUTO DIFF WBC: CPT

## 2025-04-06 PROCEDURE — 99239 HOSP IP/OBS DSCHRG MGMT >30: CPT | Performed by: INTERNAL MEDICINE

## 2025-04-06 PROCEDURE — 80048 BASIC METABOLIC PNL TOTAL CA: CPT

## 2025-04-06 PROCEDURE — P9047 ALBUMIN (HUMAN), 25%, 50ML: HCPCS | Performed by: INTERNAL MEDICINE

## 2025-04-06 PROCEDURE — 6360000002 HC RX W HCPCS: Performed by: INTERNAL MEDICINE

## 2025-04-06 PROCEDURE — 2500000003 HC RX 250 WO HCPCS: Performed by: INTERNAL MEDICINE

## 2025-04-06 RX ORDER — METOPROLOL TARTRATE 37.5 MG/1
37.5 TABLET ORAL 2 TIMES DAILY
Qty: 60 TABLET | Refills: 3 | Status: SHIPPED | OUTPATIENT
Start: 2025-04-06

## 2025-04-06 RX ORDER — FLUDROCORTISONE ACETATE 0.1 MG/1
0.1 TABLET ORAL DAILY
Qty: 30 TABLET | Refills: 0 | Status: SHIPPED | OUTPATIENT
Start: 2025-04-07 | End: 2025-05-07

## 2025-04-06 RX ORDER — MIDODRINE HYDROCHLORIDE 5 MG/1
15 TABLET ORAL
Qty: 90 TABLET | Refills: 3 | Status: SHIPPED | OUTPATIENT
Start: 2025-04-06

## 2025-04-06 RX ADMIN — HYDROCORTISONE SODIUM SUCCINATE 25 MG: 100 INJECTION, POWDER, FOR SOLUTION INTRAMUSCULAR; INTRAVENOUS at 04:50

## 2025-04-06 RX ADMIN — VENLAFAXINE 37.5 MG: 37.5 TABLET ORAL at 08:34

## 2025-04-06 RX ADMIN — ALBUMIN (HUMAN) 25 G: 0.25 INJECTION, SOLUTION INTRAVENOUS at 16:02

## 2025-04-06 RX ADMIN — POLYETHYLENE GLYCOL 3350 17 G: 17 POWDER, FOR SOLUTION ORAL at 08:40

## 2025-04-06 RX ADMIN — DEXTROSE MONOHYDRATE: 100 INJECTION, SOLUTION INTRAVENOUS at 08:25

## 2025-04-06 RX ADMIN — FLUDROCORTISONE ACETATE 0.1 MG: 0.1 TABLET ORAL at 08:33

## 2025-04-06 RX ADMIN — VALPROATE SODIUM 250 MG: 100 INJECTION, SOLUTION INTRAVENOUS at 17:01

## 2025-04-06 RX ADMIN — VALPROATE SODIUM 250 MG: 100 INJECTION, SOLUTION INTRAVENOUS at 05:40

## 2025-04-06 RX ADMIN — MIDODRINE HYDROCHLORIDE 15 MG: 10 TABLET ORAL at 11:38

## 2025-04-06 RX ADMIN — HYDROCORTISONE SODIUM SUCCINATE 25 MG: 100 INJECTION, POWDER, FOR SOLUTION INTRAMUSCULAR; INTRAVENOUS at 11:45

## 2025-04-06 RX ADMIN — HEPARIN SODIUM 5000 UNITS: 5000 INJECTION INTRAVENOUS; SUBCUTANEOUS at 05:42

## 2025-04-06 RX ADMIN — MIDODRINE HYDROCHLORIDE 15 MG: 10 TABLET ORAL at 08:21

## 2025-04-06 RX ADMIN — MIDODRINE HYDROCHLORIDE 15 MG: 10 TABLET ORAL at 17:04

## 2025-04-06 RX ADMIN — ALBUMIN (HUMAN) 25 G: 0.25 INJECTION, SOLUTION INTRAVENOUS at 04:50

## 2025-04-06 RX ADMIN — HEPARIN SODIUM 5000 UNITS: 5000 INJECTION INTRAVENOUS; SUBCUTANEOUS at 14:32

## 2025-04-06 RX ADMIN — LAMOTRIGINE 25 MG: 25 TABLET ORAL at 08:34

## 2025-04-06 RX ADMIN — VALPROATE SODIUM 250 MG: 100 INJECTION, SOLUTION INTRAVENOUS at 11:51

## 2025-04-06 ASSESSMENT — PAIN SCALES - WONG BAKER: WONGBAKER_NUMERICALRESPONSE: HURTS A LITTLE BIT

## 2025-04-06 ASSESSMENT — PAIN SCALES - GENERAL: PAINLEVEL_OUTOF10: 2

## 2025-04-06 NOTE — DISCHARGE INSTR - DIET

## 2025-04-06 NOTE — CARE COORDINATION
At this time,patient meets LTAC criteria.    Electronically signed by Lily Stallings RN on 3/28/2025 at 12:27 PM   
DISCHARGE PLANNING NOTE:    Call placed pt WANDA Lopes to discuss pt meeting criteria for LTACH. Marianne states she is not interested in LTACH at this time and wishes for pt to return to [place].    Electronically signed by LACIE Reeder on 3/31/2025 at 10:59 AM    
DISCHARGE PLANNING NOTE:    LSW following for discharge back to LTC at [facility]. No authorization needed to return once medically ready for discharge. Writer verified with Monet in admissions, that patient was NOT receiving any hospice services while at facility.   
DISCHARGE PLANNING NOTE:    LSW following for discharge back to LTC at [facility]. No authorization needed to return once medically ready for discharge. Writer verified with Monet in admissions, that patient was NOT receiving any hospice services while at facility.   
DISCHARGE PLANNING NOTE:    LSW following for discharge back to LTC at [facility]. No authorization needed to return once medically ready for discharge. Writer verified with Monet in admissions, that patient was NOT receiving any hospice services while at facility.   
DISCHARGE PLANNING NOTE:    LSW following for discharge back to facility. Patient is from LTC at [facility]. Plan to return. No insurance authorization needed.   
DISCHARGE PLANNING NOTE:    Notified by EDWARD Tee, bedside nurse that the patient's family would like to move forward with returning to [facility] with [facility] hospice services.     This writer spoke with Marianne to confirm that these are the wishes and she confirmed. Per Marianne, Yari is going to be in contact with OP, as well as [facility] to update regarding their plan. Explained to Marianne that prior to being able to discharge back to the facility we (case management) would need to be certain that the patient's hospice care is available for start of care at the facility. Marianne stated understanding.     This writer attempted to contact Monet with [facility], no answer, voicemail box is full. This writer contacted Lalitha with [facility], voicemail left, awaiting return call.    Electronically signed by Rebeca Palomo RN on 4/5/2025 at 1:17 PM    ADDENDUM:    This writer contacted [facility]osp[facility] services and spoke with Hue from the call center. Hue is unfortunately unable to access the system to view any information, therefore she took down my contact information, as well as the patient's information and is going to contact her manager and return call.    EDWARD Tee, bedside nurse updated via telephone.    Electronically signed by Rebeca Palomo RN on 4/5/2025 at 1:31 PM    
DISCHARGE PLANNING NOTE:    This writer contacted Evette from [facility] to inquire whether fax with all hospice requested information was received. Awaiting return call.    Electronically signed by Rebeca Palomo RN on 4/6/2025 at 8:55 AM    ADDENDUM:    Second call to [facility]. Evette updated this writer that she has not been in the office yet, however she will check to ensure fax was received. Per Evette they are awaiting delivery of the patient's equipment and then they can set up a time for patient to be discharged back to facility.    This writer inquired if transportation could be tentatively set up for later this afternoon in hopes that the patient would have everything available and we could secure transportation time. Awaiting return call.    Electronically signed by Rebeca Palomo RN on 4/6/2025 at 10:47 AM    
DISCHARGE PLANNING NOTE:    This writer received return call from Evette with [facility] services through [facility]. Evette requested hospice order, H&P, most recent clinical report from primary attending, pulmonology, nephrology, as well as last three days of medications and demographics sheet be faxed to her at [phone].     This writer went to patient's bedside and spoke with Yari to update. Yari updated that [facility] cannot accept the patient onto services today, however we will be moving forward once all information is received and plan for discharge back to [facility] on Sunday, 4/6/25, as long as everything can be put in place per [facility].     Extensive discussion had with Yari regarding placement, as well as coordination of care to ensure that a hospice nurse is available for intake once the patient returns to [facility].     All of Yari's questions answered to the best of my ability and it was explained that as soon as Evette from [facility] reaches out to this writer on Sunday, 4/6/25, we will move forward with setting up transportation to the facility for transition to hospice care.     Yari stated that she will be here after [facility] in the morning and was appreciative of staff.    Electronically signed by Rebeca Palomo RN on 4/05/2025 at 5:55 PM  
DISCHARGE PLANNING NOTE:    This writer spoke with Jenn from [facility] and she is in agreement for this writer to set up tentative transportation for 6:00 PM this evening in the hopes that equipment is delivered today.    This writer faxed transportation paperwork to [facility], awaiting confirmation of receipt.    Evette Doshi updated on the tentative plan. Per Evette she will update me as soon as equipment (mattress and oxygen) have been delivered to the facility.     If the equipment is unavailable today the patient's transportation will be canceled and rescheduled for tomorrow.    This writer will update patient's nilior Yari, at bedside.     Electronically signed by Rebeca Palomo RN on 4/6/2025 at 11:12 AM    ADDENDUM:    This writer updated the patient's nilior Yari, at bedside, regarding the plan and planned transportation time. Awaiting equipment delivery. Yari is in agreement with this plan.    Electronically signed by Rebeca Palomo RN on 4/6/2025 at 11:25 AM    
DISCHARGE PLANNING NOTE:    This writer was contacted by [facility] and they confirmed that we will need Glenda on board and set up prior to being able to accept the patient back at their facility. Per [facility] the patient will need to be telesitter free for 24 hours prior to discharge (updated that the patient is currently telesitter free, however I will make note).     Glenda returned call, Evette with intake, is currently on a home call and will contact me as soon as she is available to update and discuss plans.    Electronically signed by Rebeca Palomo RN on 4/5/2025 at 3:23 PM    
DISCHARGE PLANNING NOTE:    Transportation confirmed for 6:00 PM to facility. ADARSH will need completed by bedside RNYari, updated.    Report to be called to [phone].    This writer contacted the patient's niece via telephone as attempted to see her at bedside and she was not available. NieceYari, agreeable to discharge at 6:00 PM.    Electronically signed by Rebeca Palomo RN on 4/6/2025 at 2:13 PM    
DISCHARGE PLANNING NOTE:    Writer is following for potential discharge to [facility]. Pt is long-term care at facility, no authorization required for return.    Call placed to pt WANDA Lopes for update and to discuss pt still meeting LTACH criteria. Marianne declines LTACH at this time. Contact information left with Marianne if there is any change in this decision.    Electronically signed by LACIE Reeder on 4/4/2025 at 9:00 AM    
DISCHARGE PLANNING NOTE:    Writer is following for potential discharge to [facility]. No authorization required, can return to facility when medically ready.     Electronically signed by LACIE Reeder on 3/28/2025 at 9:52 AM    
DISCHARGE PLANNING NOTE:    Writer is following for potential discharge to [facility]. Pt is long-term care at facility, no authorization required, can return when medically ready.     Message placed to Monet with [facility] for update.  Electronically signed by LACIE Reeder on 3/31/2025 at 9:38 AM    
DISCHARGE PLANNING NOTE:    Writer is following for potential discharge to [facility]. Pt is long-term care at facility, no authorization required.    Pt meets LTACH criteria, pt WANDA Lopes not agreeable at this time.  Electronically signed by LACIE Reeder on 4/1/2025 at 9:08 AM    
DISCHARGE PLANNING NOTE:    Writer is following for potential discharge to [facility]. Pt is long-term care at facility, no authorization required, can return when medically ready.     Electronically signed by LACIE Reeder on 4/3/2025 at 8:59 AM   
DISCHARGE PLANNING NOTE:    Writer is following for potential discharge to [facility]. No authorization required, can return to facility when medically ready.  Electronically signed by LACIE Reeder on 3/27/2025 at 9:54 AM    
DISCHARGE PLANNING NOTE:    Writer met with pt and sukumar Greenberg in room. Yari states she is financial POA and is working with Marianne in decision making.    Yari expresses that she wishes for this pt to be transferred to [facility], but does not wish for writer to discuss issues with ICU staff. Yari states she asked to speak to the cardiologist and has been turned down.     [employer] message placed to Ximena NP with cardiology. Per Mildred, if Yari can be present tomorrow morning someone can speak on rounds, currently out of office.    Writer informed charge EDWARD Vergara of this conversation, will discuss with ICU supervisor, Isidro.    Electronically signed by LACIE Reeder on 3/27/2025 at 1:50 PM              
ONGOING DISCHARGE PLAN:    Patient is alert but restless.     Spoke with patient's daughter, Yari, at bedside regarding discharge plan. Plan is still [facility]. No auth needed.     GI consult-abnormal abdominal images  Follow for colonoscopy once more stable     NG-tube feeds    IV zosyn    Nephro consult  Creatinine 1.4 (6.9  max)  Plan to remove monie, no HD needed.    PT/OT    Will continue to follow for additional discharge needs.    If patient is discharged prior to next notation, then this note serves as note for discharge by case management.    Electronically signed by Chaparrita Day RN on 3/29/2025 at 12:48 PM   
ONGOING DISCHARGE PLAN:    Plan is still [facility]. No auth needed.     Patient agitated.    GI consult-abnormal abdominal images  Colonoscopy on 3/31    NG-tube feeds    IV zosyn    Nephro consult  Creatinine 1.3 (6.9  max)  3/29 monie removed.    PT/OT    Palliative Care    Ethics consult    Will continue to follow for additional discharge needs.    If patient is discharged prior to next notation, then this note serves as note for discharge by case management.    Electronically signed by Chaparrita Day RN on 3/30/2025 at 12:41 PM   
with the discharge plan. Freedom of choice list with basic dialogue that supports the patient's individualized plan of care/goals and shares the quality data associated with the providers was provided to: Patient Representative   Patient Representative Name: Marianne Abdi     The Patient and/or Patient Representative Agree with the Discharge Plan? Yes    Claudia Zambrano RN  Case Management Department  Ph: [phone] Fax: [phone]    
treatment of the diagnosis listed and that he requires Hospice for less 30 days.     Update Admission H&P: No change in H&P    PHYSICIAN SIGNATURE:  Electronically signed by Ryan Deluna MD on 4/6/25 at 11:15 AM EDT  Medication List    START taking these medications    START taking these medications  divalproex 500 MG extended release tablet  Commonly known as: DEPAKOTE ER  Take 1 tablet by mouth in the morning and at bedtime  Replaces: divalproex 125 MG DR tablet   lamoTRIgine 25 MG tablet  Commonly known as: LAMICTAL  Take 1 tablet by mouth daily for 7 days, THEN 1 tablet 2 times daily for 7 days, THEN 3 tablets daily for 7 days, THEN 2 tablets 2 times daily for 7 days, THEN 3 tablets 2 times daily for 7 days, THEN 4 tablets 2 times daily for 7 days, THEN 5 tablets 2 times daily for 7 days, THEN 6 tablets 2 times daily for 7 days.  Start taking on: March 28, 2025     STOP taking these medications    STOP taking these medications  divalproex 125 MG DR tablet  Commonly known as: Depakote  Replaced by: divalproex 500 MG extended release tablet     ASK your doctor about these medications    ASK your doctor about these medications  acetaminophen 500 MG tablet  Commonly known as: TYLENOL  Take 1 tablet by mouth every 6 hours as needed for Pain   ammonium lactate 12 % lotion  Commonly known as: LAC-HYDRIN  Apply topically 2 times daily Apply to legs   aspirin 81 MG EC tablet  Take 1 tablet by mouth daily   atorvastatin 20 MG tablet  Commonly known as: LIPITOR  Take 1 tablet by mouth at bedtime   bisacodyl 10 MG suppository  Commonly known as: DULCOLAX  Place 1 suppository rectally daily as needed for Constipation   calcium carbonate 500 MG Tabs tablet  Commonly known as: OSCAL  Take 1 tablet by mouth at bedtime   dapagliflozin 10 MG tablet  Commonly known as: Farxiga  Take 1 tablet by mouth every morning   fluconazole 100 MG tablet  Commonly known as: DIFLUCAN  Take 1 tablet by mouth daily   furosemide 20 MG

## 2025-04-06 NOTE — PLAN OF CARE
Problem: Chronic Conditions and Co-morbidities  Goal: Patient's chronic conditions and co-morbidity symptoms are monitored and maintained or improved  3/28/2025 0832 by Alvina Reich RN  Outcome: Progressing  3/28/2025 0606 by Leo Elizabeth RN  Outcome: Progressing  Flowsheets (Taken 3/27/2025 2000)  Care Plan - Patient's Chronic Conditions and Co-Morbidity Symptoms are Monitored and Maintained or Improved:   Monitor and assess patient's chronic conditions and comorbid symptoms for stability, deterioration, or improvement   Collaborate with multidisciplinary team to address chronic and comorbid conditions and prevent exacerbation or deterioration     Problem: Safety - Medical Restraint  Goal: Remains free of injury from restraints (Restraint for Interference with Medical Device)  Description: INTERVENTIONS:  1. Determine that other, less restrictive measures have been tried or would not be effective before applying the restraint  2. Evaluate the patient's condition at the time of restraint application  3. Inform patient/family regarding the reason for restraint  4. Q2H: Monitor safety, psychosocial status, comfort, nutrition and hydration  3/28/2025 0832 by Alvina Reich RN  Outcome: Progressing  3/28/2025 0606 by Leo Elizabeth RN  Outcome: Progressing  Flowsheets (Taken 3/27/2025 2000)  Remains free of injury from restraints (restraint for interference with medical device):   Determine that other, less restrictive measures have been tried or would not be effective before applying the restraint   Evaluate the patient's condition at the time of restraint application     Problem: Discharge Planning  Goal: Discharge to home or other facility with appropriate resources  3/28/2025 0832 by Alvina Reich RN  Outcome: Progressing  3/28/2025 0606 by Leo Elizabeth RN  Outcome: Progressing  Flowsheets (Taken 3/27/2025 2000)  Discharge to home or other facility with appropriate resources:   Identify barriers to 
  Problem: Chronic Conditions and Co-morbidities  Goal: Patient's chronic conditions and co-morbidity symptoms are monitored and maintained or improved  4/4/2025 0309 by Chichi George RN  Outcome: Progressing  Flowsheets (Taken 4/3/2025 2000)  Care Plan - Patient's Chronic Conditions and Co-Morbidity Symptoms are Monitored and Maintained or Improved:   Monitor and assess patient's chronic conditions and comorbid symptoms for stability, deterioration, or improvement   Collaborate with multidisciplinary team to address chronic and comorbid conditions and prevent exacerbation or deterioration   Update acute care plan with appropriate goals if chronic or comorbid symptoms are exacerbated and prevent overall improvement and discharge     Problem: Discharge Planning  Goal: Discharge to home or other facility with appropriate resources  4/4/2025 0309 by Chichi George RN  Outcome: Progressing  Flowsheets (Taken 4/3/2025 2000)  Discharge to home or other facility with appropriate resources:   Identify barriers to discharge with patient and caregiver   Arrange for needed discharge resources and transportation as appropriate   Identify discharge learning needs (meds, wound care, etc)     Problem: Safety - Adult  Goal: Free from fall injury  4/4/2025 0309 by Chichi George, RN  Outcome: Progressing     Problem: Pain  Goal: Verbalizes/displays adequate comfort level or baseline comfort level  4/4/2025 0309 by Chichi George RN  Outcome: Progressing  Flowsheets (Taken 4/3/2025 2000)  Verbalizes/displays adequate comfort level or baseline comfort level:   Encourage patient to monitor pain and request assistance   Assess pain using appropriate pain scale   Administer analgesics based on type and severity of pain and evaluate response   Implement non-pharmacological measures as appropriate and evaluate response     Problem: Skin/Tissue Integrity  Goal: Skin integrity remains intact  Description: 1.  Monitor for areas of 
  Problem: Chronic Conditions and Co-morbidities  Goal: Patient's chronic conditions and co-morbidity symptoms are monitored and maintained or improved  Outcome: Completed     Problem: Discharge Planning  Goal: Discharge to home or other facility with appropriate resources  Outcome: Completed     Problem: Safety - Adult  Goal: Free from fall injury  Outcome: Completed     Problem: Pain  Goal: Verbalizes/displays adequate comfort level or baseline comfort level  Outcome: Completed     Problem: Skin/Tissue Integrity  Goal: Skin integrity remains intact  Description: 1.  Monitor for areas of redness and/or skin breakdown  2.  Assess vascular access sites hourly  3.  Every 4-6 hours minimum:  Change oxygen saturation probe site  4.  Every 4-6 hours:  If on nasal continuous positive airway pressure, respiratory therapy assess nares and determine need for appliance change or resting period  Outcome: Completed     Problem: Skin/Tissue Integrity - Adult  Goal: Incisions, wounds, or drain sites healing without S/S of infection  Outcome: Completed     Problem: Gastrointestinal - Adult  Goal: Maintains or returns to baseline bowel function  Outcome: Completed     Problem: Metabolic/Fluid and Electrolytes - Adult  Goal: Electrolytes maintained within normal limits  Outcome: Completed     Problem: Metabolic/Fluid and Electrolytes - Adult  Goal: Hemodynamic stability and optimal renal function maintained  Outcome: Completed     Problem: ABCDS Injury Assessment  Goal: Absence of physical injury  Outcome: Completed     Problem: Nutrition Deficit:  Goal: Optimize nutritional status  Outcome: Completed     Problem: Neurosensory - Adult  Goal: Achieves stable or improved neurological status  Outcome: Completed     Problem: Neurosensory - Adult  Goal: Absence of seizures  Outcome: Completed     Problem: Respiratory - Adult  Goal: Achieves optimal ventilation and oxygenation  Outcome: Completed     Problem: Cardiovascular - Adult  Goal: 
  Problem: Chronic Conditions and Co-morbidities  Goal: Patient's chronic conditions and co-morbidity symptoms are monitored and maintained or improved  Outcome: Progressing     Problem: Discharge Planning  Goal: Discharge to home or other facility with appropriate resources  4/2/2025 1630 by Suad Lanza RN  Outcome: Progressing     Problem: Safety - Adult  Goal: Free from fall injury  4/2/2025 1630 by Suad Lanza RN  Outcome: Progressing     Problem: Pain  Goal: Verbalizes/displays adequate comfort level or baseline comfort level  4/2/2025 1630 by Suad Lanza RN  Outcome: Progressing     Problem: Skin/Tissue Integrity  Goal: Skin integrity remains intact  Description: 1.  Monitor for areas of redness and/or skin breakdown  2.  Assess vascular access sites hourly  3.  Every 4-6 hours minimum:  Change oxygen saturation probe site  4.  Every 4-6 hours:  If on nasal continuous positive airway pressure, respiratory therapy assess nares and determine need for appliance change or resting period  4/2/2025 1630 by Suad Lanza RN  Outcome: Progressing    Problem: Skin/Tissue Integrity  Goal: Skin integrity remains intact  Description: 1.  Monitor for areas of redness and/or skin breakdown  2.  Assess vascular access sites hourly  3.  Every 4-6 hours minimum:  Change oxygen saturation probe site  4.  Every 4-6 hours:  If on nasal continuous positive airway pressure, respiratory therapy assess nares and determine need for appliance change or resting period  4/2/2025 1630 by Suda Lanza RN  Outcome: Progressing     Problem: Skin/Tissue Integrity - Adult  Goal: Incisions, wounds, or drain sites healing without S/S of infection  Outcome: Progressing     Problem: Metabolic/Fluid and Electrolytes - Adult  Goal: Hemodynamic stability and optimal renal function maintained  Outcome: Progressing     Problem: ABCDS Injury Assessment  Goal: Absence of physical injury  Outcome: Progressing     Problem: 
  Problem: Chronic Conditions and Co-morbidities  Goal: Patient's chronic conditions and co-morbidity symptoms are monitored and maintained or improved  Outcome: Progressing     Problem: Discharge Planning  Goal: Discharge to home or other facility with appropriate resources  Outcome: Progressing     Problem: Safety - Adult  Goal: Free from fall injury  Outcome: Progressing     Problem: Pain  Goal: Verbalizes/displays adequate comfort level or baseline comfort level  Outcome: Progressing  Flowsheets (Taken 3/26/2025 2000)  Verbalizes/displays adequate comfort level or baseline comfort level:   Encourage patient to monitor pain and request assistance   Assess pain using appropriate pain scale   Administer analgesics based on type and severity of pain and evaluate response   Implement non-pharmacological measures as appropriate and evaluate response   Consider cultural and social influences on pain and pain management     Problem: Skin/Tissue Integrity  Goal: Skin integrity remains intact  Description: 1.  Monitor for areas of redness and/or skin breakdown  2.  Assess vascular access sites hourly  3.  Every 4-6 hours minimum:  Change oxygen saturation probe site  4.  Every 4-6 hours:  If on nasal continuous positive airway pressure, respiratory therapy assess nares and determine need for appliance change or resting period  Outcome: Progressing  Flowsheets (Taken 3/26/2025 2000)  Skin Integrity Remains Intact:   Monitor for areas of redness and/or skin breakdown   Assess vascular access sites hourly   Every 4-6 hours minimum: Change oxygen saturation probe site   Every 4-6 hours: If on nasal continuous positive airway pressure, respiratory therapy assesses nares and determine need for appliance change or resting period     Problem: Skin/Tissue Integrity - Adult  Goal: Incisions, wounds, or drain sites healing without S/S of infection  Outcome: Progressing  Flowsheets (Taken 3/26/2025 2000)  Incisions, Wounds, or Drain 
  Problem: Chronic Conditions and Co-morbidities  Goal: Patient's chronic conditions and co-morbidity symptoms are monitored and maintained or improved  Outcome: Progressing  Flowsheets  Taken 4/5/2025 2146 by Yahaira Cabrales RN  Care Plan - Patient's Chronic Conditions and Co-Morbidity Symptoms are Monitored and Maintained or Improved:   Monitor and assess patient's chronic conditions and comorbid symptoms for stability, deterioration, or improvement   Collaborate with multidisciplinary team to address chronic and comorbid conditions and prevent exacerbation or deterioration   Update acute care plan with appropriate goals if chronic or comorbid symptoms are exacerbated and prevent overall improvement and discharge  Taken 4/5/2025 0800 by Randal Ervin RN  Care Plan - Patient's Chronic Conditions and Co-Morbidity Symptoms are Monitored and Maintained or Improved: Monitor and assess patient's chronic conditions and comorbid symptoms for stability, deterioration, or improvement     Problem: Discharge Planning  Goal: Discharge to home or other facility with appropriate resources  Outcome: Progressing  Flowsheets  Taken 4/5/2025 2146 by Yahaira Cabrales RN  Discharge to home or other facility with appropriate resources:   Arrange for needed discharge resources and transportation as appropriate   Identify barriers to discharge with patient and caregiver  Taken 4/5/2025 0800 by Randal Ervin RN  Discharge to home or other facility with appropriate resources: Identify barriers to discharge with patient and caregiver     Problem: Safety - Adult  Goal: Free from fall injury  Outcome: Progressing  Flowsheets (Taken 4/5/2025 2146)  Free From Fall Injury: Instruct family/caregiver on patient safety     Problem: Pain  Goal: Verbalizes/displays adequate comfort level or baseline comfort level  Outcome: Progressing  Flowsheets (Taken 4/5/2025 2146)  Verbalizes/displays adequate comfort level or baseline comfort 
  Problem: Discharge Planning  Goal: Discharge to home or other facility with appropriate resources  4/1/2025 1400 by Charleen Thomas RN  Outcome: Progressing     Problem: Safety - Adult  Goal: Free from fall injury  4/2/2025 0332 by Shaun Still, RN  Outcome: Progressing     Problem: Pain  Goal: Verbalizes/displays adequate comfort level or baseline comfort level  4/2/2025 0332 by Shaun Still, RN  Outcome: Progressing     Problem: Skin/Tissue Integrity  Goal: Skin integrity remains intact  Description: 1.  Monitor for areas of redness and/or skin breakdown  2.  Assess vascular access sites hourly  3.  Every 4-6 hours minimum:  Change oxygen saturation probe site  4.  Every 4-6 hours:  If on nasal continuous positive airway pressure, respiratory therapy assess nares and determine need for appliance change or resting period  Outcome: Progressing     
  Problem: Discharge Planning  Goal: Discharge to home or other facility with appropriate resources  4/1/2025 1400 by Charleen Thomas RN  Outcome: Progressing  Pt still requiring Iv antibiotics and awaiting results of barium, swallow study     Problem: Safety - Adult  Goal: Free from fall injury  4/1/2025 1400 by Charleen Thomas, RN  Outcome: Progressing  Pt assessed as a fall risk this shift. Remains free from falls and accidental injury at this time. Fall precautions in place, including falling star sign and fall risk band on pt. Floor free from obstacles, and bed is locked and in lowest position. Adequate lighting provided.  Pt encouraged to call before getting OOB for any need.  Bed alarm activated. Will continue to monitor needs during hourly rounding, and reinforce education on use of call light.     Problem: Pain  Goal: Verbalizes/displays adequate comfort level or baseline comfort level  4/1/2025 1400 by Charleen Thomas, RN  Outcome: Progressing  Pt medicated with pain medication prn.  Assessed all pain characteristics including level, type, location, frequency, and onset.  Non-pharmacologic interventions offered to pt as well.  Pt states pain is tolerable at this time.      
  Problem: Discharge Planning  Goal: Discharge to home or other facility with appropriate resources  Outcome: Progressing     Problem: Chronic Conditions and Co-morbidities  Goal: Patient's chronic conditions and co-morbidity symptoms are monitored and maintained or improved  Outcome: Progressing     Problem: Safety - Adult  Goal: Free from fall injury  Outcome: Progressing     Problem: Pain  Goal: Verbalizes/displays adequate comfort level or baseline comfort level  Outcome: Progressing     Problem: Safety - Medical Restraint  Goal: Remains free of injury from restraints (Restraint for Interference with Medical Device)  Description: INTERVENTIONS:  1. Determine that other, less restrictive measures have been tried or would not be effective before applying the restraint  2. Evaluate the patient's condition at the time of restraint application  3. Inform patient/family regarding the reason for restraint  4. Q2H: Monitor safety, psychosocial status, comfort, nutrition and hydration  Recent Flowsheet Documentation  Taken 4/1/2025 0000 by Shaun Still RN  Remains free of injury from restraints (restraint for interference with medical device): Every 2 hours: Monitor safety, psychosocial status, comfort, nutrition and hydration  Taken 3/31/2025 2200 by Shaun Still RN  Remains free of injury from restraints (restraint for interference with medical device): Every 2 hours: Monitor safety, psychosocial status, comfort, nutrition and hydration  Taken 3/31/2025 2000 by Shaun Still RN  Remains free of injury from restraints (restraint for interference with medical device): Every 2 hours: Monitor safety, psychosocial status, comfort, nutrition and hydration  Taken 3/31/2025 1800 by Ariana Miller RN  Remains free of injury from restraints (restraint for interference with medical device): Every 2 hours: Monitor safety, psychosocial status, comfort, nutrition and hydration  Taken 3/31/2025 1730 by Paul 
  Problem: Discharge Planning  Goal: Discharge to home or other facility with appropriate resources  Outcome: Progressing     Problem: Safety - Adult  Goal: Free from fall injury  Outcome: Progressing     Problem: Pain  Goal: Verbalizes/displays adequate comfort level or baseline comfort level  Outcome: Progressing     Problem: Skin/Tissue Integrity - Adult  Goal: Incisions, wounds, or drain sites healing without S/S of infection  Outcome: Progressing     Problem: Gastrointestinal - Adult  Goal: Maintains or returns to baseline bowel function  Outcome: Progressing     Problem: Metabolic/Fluid and Electrolytes - Adult  Goal: Electrolytes maintained within normal limits  Outcome: Progressing     Problem: Metabolic/Fluid and Electrolytes - Adult  Goal: Hemodynamic stability and optimal renal function maintained  Outcome: Progressing     Problem: Neurosensory - Adult  Goal: Achieves stable or improved neurological status  Outcome: Progressing     Problem: Neurosensory - Adult  Goal: Absence of seizures  Outcome: Progressing     Problem: Respiratory - Adult  Goal: Achieves optimal ventilation and oxygenation  Outcome: Progressing     Problem: Cardiovascular - Adult  Goal: Maintains optimal cardiac output and hemodynamic stability  Outcome: Progressing     Problem: Cardiovascular - Adult  Goal: Absence of cardiac dysrhythmias or at baseline  Outcome: Progressing     Problem: Infection - Adult  Goal: Absence of infection at discharge  Outcome: Progressing     Problem: Confusion  Goal: Confusion, delirium, dementia, or psychosis is improved or at baseline  Description: INTERVENTIONS:  1. Assess for possible contributors to thought disturbance, including medications, impaired vision or hearing, underlying metabolic abnormalities, dehydration, psychiatric diagnoses, and notify attending LIP  2. [place] high risk fall precautions, as indicated  3. Provide frequent short contacts to provide reality reorientation, refocusing 
  Problem: Discharge Planning  Goal: Discharge to home or other facility with appropriate resources  Outcome: Progressing  Flowsheets (Taken 3/23/2025 0800)  Discharge to home or other facility with appropriate resources:   Identify barriers to discharge with patient and caregiver   Arrange for needed discharge resources and transportation as appropriate     Problem: Safety - Adult  Goal: Free from fall injury  Outcome: Progressing     Problem: Pain  Goal: Verbalizes/displays adequate comfort level or baseline comfort level  Outcome: Progressing     Problem: Skin/Tissue Integrity  Goal: Skin integrity remains intact  Description: 1.  Monitor for areas of redness and/or skin breakdown  2.  Assess vascular access sites hourly  3.  Every 4-6 hours minimum:  Change oxygen saturation probe site  4.  Every 4-6 hours:  If on nasal continuous positive airway pressure, respiratory therapy assess nares and determine need for appliance change or resting period  Outcome: Progressing     Problem: Skin/Tissue Integrity - Adult  Goal: Incisions, wounds, or drain sites healing without S/S of infection  Outcome: Progressing     Problem: Gastrointestinal - Adult  Goal: Maintains or returns to baseline bowel function  Outcome: Progressing     Problem: Metabolic/Fluid and Electrolytes - Adult  Goal: Electrolytes maintained within normal limits  Outcome: Progressing  Goal: Hemodynamic stability and optimal renal function maintained  Outcome: Progressing     
  Problem: Safety - Medical Restraint  Goal: Remains free of injury from restraints (Restraint for Interference with Medical Device)  Description: INTERVENTIONS:  1. Determine that other, less restrictive measures have been tried or would not be effective before applying the restraint  2. Evaluate the patient's condition at the time of restraint application  3. Inform patient/family regarding the reason for restraint  4. Q2H: Monitor safety, psychosocial status, comfort, nutrition and hydration  3/30/2025 1323 by Asim Vernon RN  Outcome: Completed     
S/p colonoscopy  Severe colonic tortuosity, unable to advance scope beyond the ascending colon.  Severe melanosis coli  Erythema and ulceration in the rectum (5mm) likely due to stercoral colitis, biopsies done.    Miralax BID  Okay for CLD and advance as tolerated from GI perspective  Follow-up pathology    GI signing off, consult as needed    Electronically signed by KINDRA Meyer NP on 3/31/2025 at 3:24 PM    
level  3/30/2025 0014 by Bradley Jiménez, EDWARD  Outcome: Progressing  3/29/2025 1447 by Fatoumata Avilez, RN  Outcome: Progressing  Flowsheets (Taken 3/29/2025 0800)  Verbalizes/displays adequate comfort level or baseline comfort level:   Encourage patient to monitor pain and request assistance   Assess pain using appropriate pain scale   Administer analgesics based on type and severity of pain and evaluate response   Implement non-pharmacological measures as appropriate and evaluate response   Consider cultural and social influences on pain and pain management   Notify Licensed Independent Practitioner if interventions unsuccessful or patient reports new pain     Problem: Skin/Tissue Integrity  Goal: Skin integrity remains intact  Description: 1.  Monitor for areas of redness and/or skin breakdown  2.  Assess vascular access sites hourly  3.  Every 4-6 hours minimum:  Change oxygen saturation probe site  4.  Every 4-6 hours:  If on nasal continuous positive airway pressure, respiratory therapy assess nares and determine need for appliance change or resting period  3/30/2025 0014 by Bradley Jiménez, EDWARD  Outcome: Progressing  3/29/2025 1447 by Fatoumata Avilez, RN  Outcome: Progressing  Flowsheets (Taken 3/29/2025 0800)  Skin Integrity Remains Intact: Monitor for areas of redness and/or skin breakdown     
sites healing without S/S of infection  Outcome: Progressing  Flowsheets (Taken 3/24/2025 2000)  Incisions, Wounds, or Drain Sites Healing Without Sign and Symptoms of Infection: TWICE DAILY: Assess and document skin integrity     Problem: Gastrointestinal - Adult  Goal: Maintains or returns to baseline bowel function  Outcome: Progressing  Flowsheets (Taken 3/24/2025 2000)  Maintains or returns to baseline bowel function: Administer ordered medications as needed     Problem: Metabolic/Fluid and Electrolytes - Adult  Goal: Electrolytes maintained within normal limits  Outcome: Progressing  Flowsheets (Taken 3/24/2025 2000)  Electrolytes maintained within normal limits:   Monitor labs and assess patient for signs and symptoms of electrolyte imbalances   Administer electrolyte replacement as ordered   Monitor response to electrolyte replacements, including repeat lab results as appropriate  Goal: Hemodynamic stability and optimal renal function maintained  Outcome: Progressing  Flowsheets (Taken 3/24/2025 2000)  Hemodynamic stability and optimal renal function maintained:   Monitor labs and assess for signs and symptoms of volume excess or deficit   Monitor intake, output and patient weight   Monitor urine specific gravity, serum osmolarity and serum sodium as indicated or ordered     Problem: ABCDS Injury Assessment  Goal: Absence of physical injury  Outcome: Progressing     Problem: Safety - Medical Restraint  Goal: Remains free of injury from restraints (Restraint for Interference with Medical Device)  Description: INTERVENTIONS:  1. Determine that other, less restrictive measures have been tried or would not be effective before applying the restraint  2. Evaluate the patient's condition at the time of restraint application  3. Inform patient/family regarding the reason for restraint  4. Q2H: Monitor safety, psychosocial status, comfort, nutrition and hydration  Outcome: Progressing     
skin breakdown  2.  Assess vascular access sites hourly  3.  Every 4-6 hours minimum:  Change oxygen saturation probe site  4.  Every 4-6 hours:  If on nasal continuous positive airway pressure, respiratory therapy assess nares and determine need for appliance change or resting period  3/28/2025 1658 by Yadira Jean, RN  Outcome: Progressing  Flowsheets (Taken 3/28/2025 1658)  Skin Integrity Remains Intact: Monitor for areas of redness and/or skin breakdown     Problem: Skin/Tissue Integrity - Adult  Goal: Incisions, wounds, or drain sites healing without S/S of infection  3/28/2025 1658 by Yadira Jean, RN  Outcome: Progressing  Flowsheets (Taken 3/28/2025 1658)  Incisions, Wounds, or Drain Sites Healing Without Sign and Symptoms of Infection: ADMISSION and DAILY: Assess and document risk factors for pressure ulcer development     Problem: Gastrointestinal - Adult  Goal: Maintains or returns to baseline bowel function  3/28/2025 1658 by Yadira Jean RN  Outcome: Progressing  Flowsheets (Taken 3/28/2025 1658)  Maintains or returns to baseline bowel function: Assess bowel function     Problem: Metabolic/Fluid and Electrolytes - Adult  Goal: Electrolytes maintained within normal limits  3/28/2025 1658 by Yadira Jean RN  Outcome: Progressing  Flowsheets (Taken 3/28/2025 1658)  Electrolytes maintained within normal limits:   Monitor labs and assess patient for signs and symptoms of electrolyte imbalances   Administer electrolyte replacement as ordered   Monitor response to electrolyte replacements, including repeat lab results as appropriate     Problem: Metabolic/Fluid and Electrolytes - Adult  Goal: Hemodynamic stability and optimal renal function maintained  3/28/2025 1658 by Yadira Jean RN  Outcome: Progressing  Flowsheets (Taken 3/28/2025 1658)  Hemodynamic stability and optimal renal function maintained:   Monitor labs and assess for signs and symptoms of 
Implement safety measures based on patient assessment  Note: No physical injury     Problem: Safety - Medical Restraint  Goal: Remains free of injury from restraints (Restraint for Interference with Medical Device)  Description: INTERVENTIONS:  1. Determine that other, less restrictive measures have been tried or would not be effective before applying the restraint  2. Evaluate the patient's condition at the time of restraint application  3. Inform patient/family regarding the reason for restraint  4. Q2H: Monitor safety, psychosocial status, comfort, nutrition and hydration  Outcome: Progressing  Flowsheets (Taken 3/26/2025 0622)  Remains free of injury from restraints (restraint for interference with medical device):   Determine that other, less restrictive measures have been tried or would not be effective before applying the restraint   Evaluate the patient's condition at the time of restraint application   Every 2 hours: Monitor safety, psychosocial status, comfort, nutrition and hydration  Note: Pt was assessed for injury     Problem: Chronic Conditions and Co-morbidities  Goal: Patient's chronic conditions and co-morbidity symptoms are monitored and maintained or improved  Outcome: Not Progressing  Flowsheets (Taken 3/26/2025 0622)  Care Plan - Patient's Chronic Conditions and Co-Morbidity Symptoms are Monitored and Maintained or Improved:   Monitor and assess patient's chronic conditions and comorbid symptoms for stability, deterioration, or improvement   Collaborate with multidisciplinary team to address chronic and comorbid conditions and prevent exacerbation or deterioration   Update acute care plan with appropriate goals if chronic or comorbid symptoms are exacerbated and prevent overall improvement and discharge  Note: Pt had K of 3.0, Levo up to 12, 300 out of NG, no signs of digestion     Problem: Pain  Goal: Verbalizes/displays adequate comfort level or baseline comfort level  Outcome: Not 
electrolyte replacements, including repeat lab results as appropriate  3/28/2025 1658 by Yadira Jean RN  Outcome: Progressing  Flowsheets (Taken 3/28/2025 1658)  Electrolytes maintained within normal limits:   Monitor labs and assess patient for signs and symptoms of electrolyte imbalances   Administer electrolyte replacement as ordered   Monitor response to electrolyte replacements, including repeat lab results as appropriate  Goal: Hemodynamic stability and optimal renal function maintained  3/29/2025 0304 by Tresa Benites RN  Outcome: Progressing  Flowsheets (Taken 3/29/2025 0000)  Hemodynamic stability and optimal renal function maintained:   Monitor labs and assess for signs and symptoms of volume excess or deficit   Monitor intake, output and patient weight   Monitor urine specific gravity, serum osmolarity and serum sodium as indicated or ordered   Encourage oral intake as appropriate   Monitor response to interventions for patient's volume status, including labs, urine output, blood pressure (other measures as available)   Instruct patient on fluid and nutrition restrictions as appropriate  3/28/2025 1658 by Yadira Jean RN  Outcome: Progressing  Flowsheets (Taken 3/28/2025 1658)  Hemodynamic stability and optimal renal function maintained:   Monitor labs and assess for signs and symptoms of volume excess or deficit   Monitor intake, output and patient weight   Monitor response to interventions for patient's volume status, including labs, urine output, blood pressure (other measures as available)     Problem: ABCDS Injury Assessment  Goal: Absence of physical injury  3/29/2025 0304 by Tresa Benites, RN  Outcome: Progressing  Flowsheets (Taken 3/28/2025 1658 by Yadira Jean RN)  Absence of Physical Injury: Implement safety measures based on patient assessment  3/28/2025 1658 by Yadira Jean RN  Outcome: Progressing  Flowsheets (Taken 3/28/2025 1658)  Absence of 
dementia, or psychosis is improved or at baseline  Description: INTERVENTIONS:  1. Assess for possible contributors to thought disturbance, including medications, impaired vision or hearing, underlying metabolic abnormalities, dehydration, psychiatric diagnoses, and notify attending LIP  2. [place] high risk fall precautions, as indicated  3. Provide frequent short contacts to provide reality reorientation, refocusing and direction  4. Decrease environmental stimuli, including noise as appropriate  5. Monitor and intervene to maintain adequate nutrition, hydration, elimination, sleep and activity  6. If unable to ensure safety without constant attention obtain sitter and review sitter guidelines with assigned personnel  7. Initiate Psychosocial CNS and Spiritual Care consult, as indicated  Outcome: Progressing  Flowsheets (Taken 3/27/2025 2000)  Effect of thought disturbance (confusion, delirium, dementia, or psychosis) are managed with adequate functional status:   Assess for contributors to thought disturbance, including medications, impaired vision or hearing, underlying metabolic abnormalities, dehydration, psychiatric diagnoses, notify LIP   [place] high risk fall precautions, as indicated     Problem: Behavior  Goal: Pt/Family maintain appropriate behavior and adhere to behavioral management agreement, if implemented  Description: INTERVENTIONS:  1. Assess patient/family's coping skills and  non-compliant behavior (including use of illegal substances)  2. Notify security of behavior or suspected illegal substances which indicate the need for search of the family and/or belongings  3. Encourage verbalization of thoughts and concerns in a socially appropriate manner  4. Utilize positive, consistent limit setting strategies supporting safety of patient, staff and others  5. Encourage participation in the decision making process about the behavioral management agreement  6. If a visitor's behavior poses a 
Assess patient/family’s coping skills and  non-compliant behavior (including use of illegal substances)     Problem: Risk for Elopement  Goal: Patient will not exit the unit/facility without proper excort  4/3/2025 0246 by Ellyn Pollack RN  Outcome: Progressing  Flowsheets (Taken 4/3/2025 0000)  Nursing Interventions for Elopement Risk:   Assist with personal care needs such as toileting, eating, dressing, as needed to reduce the risk of wandering   Reduce environmental triggers     
supporting safety of patient, staff and others  5. Encourage participation in the decision making process about the behavioral management agreement  6. If a visitor's behavior poses a threat to safety call refer to organization policy.  7. Initiate consult with , Psychosocial CNS, Spiritual Care as appropriate  Outcome: Progressing     Problem: Risk for Elopement  Goal: Patient will not exit the unit/facility without proper excort  Outcome: Progressing  Flowsheets (Taken 4/3/2025 0400 by Ellyn Pollack, RN)  Nursing Interventions for Elopement Risk:   Assist with personal care needs such as toileting, eating, dressing, as needed to reduce the risk of wandering   Reduce environmental triggers     
wandering     
Assist with personal care needs such as toileting, eating, dressing, as needed to reduce the risk of wandering   Collaborate with family members/caregivers to mitigate the elopement risk   Collaborate with treatment team for drug withdrawal symptoms treatment   Collaborate with treatment team for nicotine replacement   Communicate/escalate to charge nurse the risk of elopement   Communicate/escalate to nursing supervisor the risk of elopement   Communicate/escalate to /other team member the risk of elopement   Communicate to physician the risk for elopement   Make sure patient has all necessary personal care items   Escort with two staff members if patient must leave the unit   Place patient in room far away from exits and stairways   Reduce environmental triggers   Shoes and clothing collected and placed in gown attire  Taken 3/29/2025 0400 by Tresa Benites RN  Nursing Interventions for Elopement Risk:   Assist with personal care needs such as toileting, eating, dressing, as needed to reduce the risk of wandering   Collaborate with family members/caregivers to mitigate the elopement risk   Collaborate with treatment team for drug withdrawal symptoms treatment   Collaborate with treatment team for nicotine replacement   Communicate/escalate to charge nurse the risk of elopement   Communicate/escalate to nursing supervisor the risk of elopement   Communicate/escalate to /other team member the risk of elopement   Communicate to physician the risk for elopement   Make sure patient has all necessary personal care items   Escort with two staff members if patient must leave the unit   Place patient in room far away from exits and stairways   Reduce environmental triggers   Shoes and clothing collected and placed in gown attire     
0000  Nursing Interventions for Elopement Risk:   Assist with personal care needs such as toileting, eating, dressing, as needed to reduce the risk of wandering   Collaborate with family members/caregivers to mitigate the elopement risk  Taken 4/4/2025 2000  Nursing Interventions for Elopement Risk:   Assist with personal care needs such as toileting, eating, dressing, as needed to reduce the risk of wandering   Collaborate with family members/caregivers to mitigate the elopement risk  4/4/2025 1617 by Fatoumata Avilez RN  Outcome: Progressing  Flowsheets  Taken 4/4/2025 1600  Nursing Interventions for Elopement Risk: Assist with personal care needs such as toileting, eating, dressing, as needed to reduce the risk of wandering  Taken 4/4/2025 1200  Nursing Interventions for Elopement Risk: Assist with personal care needs such as toileting, eating, dressing, as needed to reduce the risk of wandering  Taken 4/4/2025 0800  Nursing Interventions for Elopement Risk: Assist with personal care needs such as toileting, eating, dressing, as needed to reduce the risk of wandering

## 2025-04-06 NOTE — PROGRESS NOTES
ICU Progress Note (Non-Vent)  [facility]monary [facility] Critical Care Specialists    Patient - Michael Mcghee,  Age - 89 y.o.    - 1935      Room Number -    N -  [id number]   [facility] # - [id number]  Date of Admission -  3/22/2025  6:59 PM    Events of Past 24 Hours     Overnight patient had to be on pressors currently off and more alert.  I spoke to Yrai and she is quite confused because she feels that cardiology told her that \"as long as a fluid is removed, he will get better\".  She also states that cardiology is deferring to nephrology.  Looks to me like she is confused with multiple specialists.  I am not sure if she is intentionally confusing things or she has a misunderstanding.  However reviewing the notes on this long hospitalization, this has been occurring with her.  I have tried to reach out to the actual medical power of  Marianne unsuccessfully.    Vitals    height is 1.778 m (5' 10\") and weight is 104.8 kg (231 lb 0.7 oz). His axillary temperature is 97.5 °F (36.4 °C). His blood pressure is 105/61 and his pulse is 83. His respiration is 21 and oxygen saturation is 100%.       Temperature Range: Temp: 97.5 °F (36.4 °C) Temp  Av.2 °F (36.8 °C)  Min: 97.5 °F (36.4 °C)  Max: 98.6 °F (37 °C)  BP Range:  Systolic (24hrs), Av , Min:87 , Max:116     Diastolic (24hrs), Av, Min:41, Max:80    Pulse Range: Pulse  Av.6  Min: 75  Max: 138  Respiration Range: Resp  Av.5  Min: 16  Max: 34  Current Pulse Ox::  SpO2: 100 %  24HR Pulse Ox Range:  SpO2  Av %  Min: 98 %  Max: 100 %  Oxygen Amount and Delivery: O2 Flow Rate (L/min): 2 L/min    Wt Readings from Last 3 Encounters:   25 104.8 kg (231 lb 0.7 oz)   25 92.1 kg (203 lb)   24 85.4 kg (188 lb 3.2 oz)     I/O     Intake/Output Summary (Last 24 hours) at 2025 1424  Last data filed at 2025 0548  Gross per 24 hour   Intake 
                                                                         ICU Progress Note (Non-Vent)  [facility] Pulmonary [facility] Critical Care Specialists    Patient - Michael Mcghee,  Age - 89 y.o.    - 1935      Room Number -    MRN -  [id number]   [facility] # - [id number]  Date of Admission -  3/22/2025  6:59 PM    Events of Past 24 Hours   Much more alert, he answers some questions quite appropriately follows commands has a lot of upper airway secretions.    Has been on norepinephrine 8 mics per minute  IV fluids restarted    Vitals    height is 1.778 m (5' 10\") and weight is 91.2 kg (201 lb 1 oz). His axillary temperature is 98.7 °F (37.1 °C). His blood pressure is 112/52 (abnormal) and his pulse is 81. His respiration is 23 and oxygen saturation is 98%.       Temperature Range: Temp: 98.7 °F (37.1 °C) Temp  Av.7 °F (36.5 °C)  Min: 96.9 °F (36.1 °C)  Max: 98.7 °F (37.1 °C)  BP Range:  Systolic (24hrs), Av , Min:62 , Max:161     Diastolic (24hrs), Av, Min:31, Max:132    Pulse Range: Pulse  Av.2  Min: 70  Max: 105  Respiration Range: Resp  Av.9  Min: 17  Max: 32  Current Pulse Ox::  SpO2: 98 %  24HR Pulse Ox Range:  SpO2  Av.4 %  Min: 92 %  Max: 100 %  Oxygen Amount and Delivery: O2 Flow Rate (L/min): 2 L/min    Wt Readings from Last 3 Encounters:   25 91.2 kg (201 lb 1 oz)   25 92.1 kg (203 lb)   24 85.4 kg (188 lb 3.2 oz)     I/O     Intake/Output Summary (Last 24 hours) at 3/26/2025 0917  Last data filed at 3/26/2025 0630  Gross per 24 hour   Intake 306.23 ml   Output 1800 ml   Net -1493.77 ml     DRAIN/TUBE OUTPUT       Invasive Lines   ICP PRESSURE RANGE  No data recorded  CVP PRESSURE RANGE  No data recorded      Medications      valproate (DEPACON) 500 mg in sodium chloride 0.9 % 100 mL IVPB  500 mg IntraVENous Q12H    venlafaxine  37.5 mg Oral BID    linezolid  600 mg IntraVENous Q12H    midodrine  10 mg Oral TID WC    piperacillin-tazobactam  4,500 mg 
                                                                         ICU Progress Note (Non-Vent)  [facility] Pulmonary [facility] Critical [facility] Special[facility]    Patient - Michael Mcghee,  Age - 89 y.o.    - 1935      Room Number -    MRN -  [id number]   [facility] # - [id number]  Date of Admission -  3/22/2025  6:59 PM    Events of Past 24 Hours   Patient transferred back yesterday because of increasing lethargy  VBG showed increased CO2, with a pCO2 of 62.  His pH was 7.25.  Patient then transferred to intermediate ICU placed on BiPAP and repeat VBG showed a pH of 7.4 with a pCO2 now of 37    Vitals    height is 1.778 m (5' 10\") and weight is 103.7 kg (228 lb 9.9 oz). His oral temperature is 97.6 °F (36.4 °C). His blood pressure is 86/49 (abnormal) and his pulse is 95. His respiration is 20 and oxygen saturation is 100%.       Temperature Range: Temp: 97.6 °F (36.4 °C) Temp  Av.3 °F (36.3 °C)  Min: 96.2 °F (35.7 °C)  Max: 97.7 °F (36.5 °C)  BP Range:  Systolic (24hrs), Av , Min:64 , Max:123     Diastolic (24hrs), Av, Min:34, Max:100    Pulse Range: Pulse  Av.4  Min: 80  Max: 127  Respiration Range: Resp  Av.2  Min: 0  Max: 31  Current Pulse Ox::  SpO2: 100 %  24HR Pulse Ox Range:  SpO2  Av.8 %  Min: 91 %  Max: 100 %  Oxygen Amount and Delivery: O2 Flow Rate (L/min): 3 L/min    Wt Readings from Last 3 Encounters:   25 103.7 kg (228 lb 9.9 oz)   25 92.1 kg (203 lb)   24 85.4 kg (188 lb 3.2 oz)     I/O     Intake/Output Summary (Last 24 hours) at 4/3/2025 0912  Last data filed at 4/3/2025 0600  Gross per 24 hour   Intake 418.17 ml   Output 1000 ml   Net -581.83 ml     DRAIN/TUBE OUTPUT       Invasive Lines   ICP PRESSURE RANGE  No data recorded  CVP PRESSURE RANGE  No data recorded      Medications      hydrocortisone sodium succinate PF  50 mg IntraVENous Q8H    polyethylene glycol  17 g Oral BID    metoprolol tartrate  25 mg Oral BID    tamsulosin  0.4 mg Oral Nightly 
                                                                         ICU Progress Note (Non-Vent)  [facility] Pulmonary [facility] Critical Care Special[facility]    Patient - Michael Mcghee,  Age - 89 y.o.    - 1935      Room Number -    MRN -  [id number]   [facility] # - [id number]  Date of Admission -  3/22/2025  6:59 PM    Events of Past 24 Hours   Continues to be alert, speech to me seems to be improved compared to 72 hours ago.  Follows all commands for me now    Has been on norepinephrine 5 mics per minute, MAP is 77  IV fluids changed to half-normal saline at 100 mL/h    Vitals    height is 1.778 m (5' 10\") and weight is 94.5 kg (208 lb 5.4 oz). His axillary temperature is 98.5 °F (36.9 °C). His blood pressure is 130/54 (abnormal) and his pulse is 80. His respiration is 26 and oxygen saturation is 99%.       Temperature Range: Temp: 98.5 °F (36.9 °C) Temp  Av.3 °F (36.8 °C)  Min: 97.5 °F (36.4 °C)  Max: 99.3 °F (37.4 °C)  BP Range:  Systolic (24hrs), Av , Min:70 , Max:156     Diastolic (24hrs), Av, Min:35, Max:109    Pulse Range: Pulse  Av.9  Min: 79  Max: 105  Respiration Range: Resp  Av.8  Min: 18  Max: 31  Current Pulse Ox::  SpO2: 99 %  24HR Pulse Ox Range:  SpO2  Av.8 %  Min: 95 %  Max: 100 %  Oxygen Amount and Delivery: O2 Flow Rate (L/min): (S) 1 L/min    Wt Readings from Last 3 Encounters:   25 94.5 kg (208 lb 5.4 oz)   25 92.1 kg (203 lb)   24 85.4 kg (188 lb 3.2 oz)     I/O     Intake/Output Summary (Last 24 hours) at 3/27/2025 1043  Last data filed at 3/27/2025 0443  Gross per 24 hour   Intake 8804.53 ml   Output 1475 ml   Net 7329.53 ml     DRAIN/TUBE OUTPUT       Invasive Lines   ICP PRESSURE RANGE  No data recorded  CVP PRESSURE RANGE  No data recorded      Medications      magnesium sulfate  2,000 mg IntraVENous Once    piperacillin-tazobactam  4,500 mg IntraVENous Once    Followed by    piperacillin-tazobactam  3,375 mg IntraVENous Q8H    
    [facility] Internal Medicine  Aquiles Taylor MD; Franki Hung MD; Valente León MD; MD Summer Harp MD; Aure Morales MD  [facility] Internal Medicine   IN-PATIENT SERVICE  [facility]                 Date:   3/23/2025  Patientname:  Michael Mcghee  Date of admission:  3/22/2025  6:59 PM  MRN:   [id number]  Account:  [id number]  YOB: 1935  PCP:    Bruno Metcalf APRN - CNP  Room:   04/04  Code Status:    DNR-CCA      Chief Complaint:     Chief Complaint   Patient presents with    Aggressive Behavior    Hypoglycemia    Hip Pain     History of Present Illness:     Michael Mcghee is a 89 y.o. Non- / non  male who presents with Aggressive Behavior, Hypoglycemia, and Hip Pain   and is admitted to the hospital for the management of Acute renal failure (ARF).    Patient was sent in from his facility for altered mentation.  Patient was being more aggressive with staff and was unable to be oriented.  Patient has a significant medical history of CHF, CAD, diabetes, and dementia.    Upon arrival to the hospital patient was complaining of left hip.  Along with disorientation to situation, place, and time.    Lab work was completed on patient on arrival that shows a critical potassium level of 6.6, and a severely elevated creatinine at 5.9 with last historical value being 1.1.  Patient was treated with insulin dextrose and a consultation was made to nephrology.  There was an attempt to give patient Lokelma and Kayexalate, but patient was unable to tolerate taking medications orally.  On repeat BMP patient's hyperkalemia was worsening.  NG tube was placed in ED for administration of Lokelma and Kayexalate.  Nephrology is open to starting patient on hemodialysis.    ED physician called patient's POA to discuss plan of care.  Unfortunately there is some miscommunication and current POA status.  The current legally listed POA on all paperwork has not 
    [facility] PULMONARY,CRITICAL CARE & SLEEP   Shonjack Arriaga MD/Jonathon ARGUELLES AGACHRISTINAP-BC, NP-C      Karin ARGUELLES NP-C     Hakan ARGUELLES NP-C                                          Pulmonary Progress Note    Patient - Michael Mcghee   Age - 89 y.o.   - 1935  MRN - [id number]  [id number]  Date of Admission - 3/22/2025  6:59 PM    Consulting Service/Physician:       Primary Care Physician: Bruno Metcalf, APRN - CNP    SUBJECTIVE:     Chief Complaint:   Chief Complaint   Patient presents with    Aggressive Behavior    Hypoglycemia    Hip Pain     Subjective:    Michael is seen lying in bed.  He remains on room air.  He did receive 1 dose of IV Lasix yesterday.  Blood pressure continues to be borderline.  He has been afebrile.  He continues on D10 due to some hypoglycemia.  He was started on IV Solu-Cortef today per primary service.  Chest x-ray yesterday showed pleural effusions more so on the left with some mild vascular congestion.  Question of possible dilated loop of bowel under right diaphragm.    VITALS  /67   Pulse (!) 107   Temp (!) 96.2 °F (35.7 °C) (Axillary) Comment: warm blanket added  Resp 22   Ht 1.778 m (5' 10\")   Wt 99.8 kg (220 lb)   SpO2 96%   BMI 31.57 kg/m²   Wt Readings from Last 3 Encounters:   25 99.8 kg (220 lb)   25 92.1 kg (203 lb)   24 85.4 kg (188 lb 3.2 oz)     I/O (24 Hours)    Intake/Output Summary (Last 24 hours) at 2025 1214  Last data filed at 2025 0530  Gross per 24 hour   Intake 1231 ml   Output 1100 ml   Net 131 ml     Ventilator:      Exam:   Physical Exam   Constitutional: Elderly man lying in bed on room air  HENT: Unremarkable, NG tube intact  Head: Normocephalic and atraumatic.   Eyes: EOM are normal. Pupils are equal, round, and reactive to light.   Neck: Neck supple.   Cardiovascular:  Regular rate and rhythm.  Normal heart tones.  No JVD.    Pulmonary/Chest: Appears 
    Department of Internal Medicine  Nephrology Hitesh Platt MD  Progress Note    Reason for consultation: Management of Hyperkalemia and acute kidney injury.       Requesting physician: Latosha Ocampo MD.    Interval history:   Patient was seen and examined today   patient is awake and alert and not in distress, less confused today  He is hemodynamically stable and nonoliguric.  Hemodialysis catheter was removed 3/29/25   Patient is on IV fluids D5 water 75 mL/h, episodes of hypoglycemia noted, kidney function remained stable serum creatinine 1.1 mg/dL    Peripheral edema noted    History of present illness:: This is a 89 y.o. male with a significant past medical history of type 2 diabetes mellitus, dementia,  coronary artery disease [s/p CABG in 1990], heart failure with reduced ejection fraction [HFrEF with LVEF 30 to 35% and grade 1 diastolic dysfunction], systemic hypertension, benign prostatic hyperplasia, spinal stenosis, hyperlipidemia and chronic kidney disease stage IIIb [baseline serum creatinine 1.5 mg/dL], who came in from the [facility] for further evaluation of worsening left hip pain, aggressive behavior and confusion. EMS arrived, they found patient was hypoglycemic and was given dextrose.  Vital signs were stable at presentation with blood pressure 126/86 mmHg.  There is no clear history of falling but there was bruising over the left hip.    Laboratory studies were remarkable for serum potassium 6.3 mmol/L and BUNs/creatinine 100/5.6 mg/dL and hence nephrology consultation.  Hyperkalemia was treated medically with dextrose and insulin overnight as well as Kayexalate.  Serum potassium is improved to 5.8 mmol/L this morning.  Indwelling Bergeron catheter was placed in the emergency room last night he also has an NG tube due to inability to take oral medications.  Home medications include potassium chloride, spironolactone, Furosemide and sacubitril/valsartan [Entresto].  He remains pleasantly confused 
    Department of Internal Medicine  Nephrology Hitesh Platt MD  Progress Note    Reason for consultation: Management of Hyperkalemia and acute kidney injury.       Requesting physician: Latosha Ocampo MD.    Interval history:   Patient was seen and examined today and he is awake but lethargic and oriented x 1.  He is on oxygen 3 L/min via nasal cannula.    Patient remains edematous  Blood pressure improved  Off pressors   Remains oliguric.  He has a pure wick external catheter.    Bp 110/63  HR 92/min  Scr slowly trending up to 1.6 mg/dl from 1.4  Baseline Scr 1.2-1.4  Solu cortef and florinef    CXR IMPRESSION:  Small left pleural effusion with mild left basilar airspace disease which  could reflect a combination of atelectasis or pneumonia    History of present illness:: This is a 89 y.o. male with a significant past medical history of type 2 diabetes mellitus, dementia,  coronary artery disease [s/p CABG in 1990], heart failure with reduced ejection fraction [HFrEF with LVEF 30 to 35% and grade 1 diastolic dysfunction], systemic hypertension, benign prostatic hyperplasia, spinal stenosis, hyperlipidemia and chronic kidney disease stage IIIb [baseline serum creatinine 1.5 mg/dL], who [facility] the [facility] for further evaluation of worsening left hip pain, aggressive behavior and confusion. EMS arrived, they found patient was hypoglycemic and was given dextrose.  Vital signs were stable at presentation with blood pressure 126/86 mmHg.  There is no clear history of falling but there was bruising over the left hip.    Laboratory studies were remarkable for serum potassium 6.3 mmol/L and BUNs/creatinine 100/5.6 mg/dL and hence nephrology consultation.  Hyperkalemia was treated medically with dextrose and insulin overnight as well as Kayexalate.  Serum potassium is improved to 5.8 mmol/L this morning.  Indwelling Bergeron catheter was placed in the emergency room last night he also has an NG tube due to inability 
    Department of Internal Medicine  Nephrology Hitesh Platt MD  Progress Note    Reason for consultation: Management of Hyperkalemia and acute kidney injury.       Requesting physician: Latosha Ocampo MD.    Interval history:   Patient was seen and examined today, he is lethargic . He is on oxygen 3 L/min via nasal cannula.    Patient remains edematous  Blood pressure improved    He has a pure wick external catheter.   ml yesterday in 24 hours  Niece at bedside.  DNRCC  SNF with Hospice.      History of present illness:: This is a 89 y.o. male with a significant past medical history of type 2 diabetes mellitus, dementia,  coronary artery disease [s/p CABG in 1990], heart failure with reduced ejection fraction [HFrEF with LVEF 30 to 35% and grade 1 diastolic dysfunction], systemic hypertension, benign prostatic hyperplasia, spinal stenosis, hyperlipidemia and chronic kidney disease stage IIIb [baseline serum creatinine 1.5 mg/dL], who came in from the [facility] for further evaluation of worsening left hip pain, aggressive behavior and confusion. EMS arrived, they found patient was hypoglycemic and was given dextrose.  Vital signs were stable at presentation with blood pressure 126/86 mmHg.  There is no clear history of falling but there was bruising over the left hip.    Laboratory studies were remarkable for serum potassium 6.3 mmol/L and BUNs/creatinine 100/5.6 mg/dL and hence nephrology consultation.  Hyperkalemia was treated medically with dextrose and insulin overnight as well as Kayexalate.  Serum potassium is improved to 5.8 mmol/L this morning.  Indwelling Bergeron catheter was placed in the emergency room last night he also has an NG tube due to inability to take oral medications.  Home medications include potassium chloride, spironolactone, Furosemide and sacubitril/valsartan [Entresto].  He remains pleasantly confused and appears clinically dehydrated.    He is on room air and does not appear to be 
    Department of Internal Medicine  Nephrology Jadiel Villarreal MD  Progress Note    Reason for consultation: Management of Hyperkalemia and acute kidney injury.       Requesting physician: Latosha Ocampo MD.    Interval history: Patient remains pleasantly confused and disoriented.  He received acute hemodialysis yesterday for 2 hours with net fluid gain.  Had episode of hypoglycemia last night and IV fluid was changed to 10% dextrose at 100 mL/h.  His niece, Yari was present today and had multiple questions that were satisfactorily answered by myself and Dr. Ocampo of internal medicine.    History of present illness:: This is a 89 y.o. male with a significant past medical history of type 2 diabetes mellitus, dementia,  coronary artery disease [s/p CABG in 1990], heart failure with reduced ejection fraction [HFrEF with LVEF 30 to 35% and grade 1 diastolic dysfunction], systemic hypertension, benign prostatic hyperplasia, spinal stenosis, hyperlipidemia and chronic kidney disease stage IIIb [baseline serum creatinine 1.5 mg/dL], who came in from the [facility] for further evaluation of worsening left hip pain, aggressive behavior and confusion. EMS arrived, they found patient was hypoglycemic and was given dextrose.  Vital signs were stable at presentation with blood pressure 126/86 mmHg.  There is no clear history of falling but there was bruising over the left hip.    Laboratory studies were remarkable for serum potassium 6.3 mmol/L and BUNs/creatinine 100/5.6 mg/dL and hence nephrology consultation.  Hyperkalemia was treated medically with dextrose and insulin overnight as well as Kayexalate.  Serum potassium is improved to 5.8 mmol/L this morning.  Indwelling Bergeron catheter was placed in the emergency room last night he also has an NG tube due to inability to take oral medications.  Home medications include potassium chloride, spironolactone, Furosemide and sacubitril/valsartan [Entresto].  He remains pleasantly 
    Department of Internal Medicine  Nephrology Jadiel Villarreal MD  Progress Note    Reason for consultation: Management of Hyperkalemia and acute kidney injury.       Requesting physician: Latosha Ocampo MD.    Interval history: Patient was seen and examined today and he has been off pressors since 9 AM on 3/28/2025.  He is still pleasantly confused but is not in respiratory distress.  He is nonoliguric.  Laboratory studies were reviewed.    History of present illness:: This is a 89 y.o. male with a significant past medical history of type 2 diabetes mellitus, dementia,  coronary artery disease [s/p CABG in 1990], heart failure with reduced ejection fraction [HFrEF with LVEF 30 to 35% and grade 1 diastolic dysfunction], systemic hypertension, benign prostatic hyperplasia, spinal stenosis, hyperlipidemia and chronic kidney disease stage IIIb [baseline serum creatinine 1.5 mg/dL], who came in from the [facility] for further evaluation of worsening left hip pain, aggressive behavior and confusion. EMS arrived, they found patient was hypoglycemic and was given dextrose.  Vital signs were stable at presentation with blood pressure 126/86 mmHg.  There is no clear history of falling but there was bruising over the left hip.    Laboratory studies were remarkable for serum potassium 6.3 mmol/L and BUNs/creatinine 100/5.6 mg/dL and hence nephrology consultation.  Hyperkalemia was treated medically with dextrose and insulin overnight as well as Kayexalate.  Serum potassium is improved to 5.8 mmol/L this morning.  Indwelling Bergeron catheter was placed in the emergency room last night he also has an NG tube due to inability to take oral medications.  Home medications include potassium chloride, spironolactone, Furosemide and sacubitril/valsartan [Entresto].  He remains pleasantly confused and appears clinically dehydrated.    He is on room air and does not appear to be in acute distress.    Scheduled Meds:   piperacillin-tazobactam  
    Department of Internal Medicine  Nephrology Jadiel Villarreal MD  Progress Note    Reason for consultation: Management of Hyperkalemia and acute kidney injury.       Requesting physician: Latosha Ocampo MD.    Interval history: Patient was seen and examined today and is awake but pleasantly confused.  He is not in respiratory distress.  He is nonoliguric. Laboratory studies were reviewed.    History of present illness:: This is a 89 y.o. male with a significant past medical history of type 2 diabetes mellitus, dementia,  coronary artery disease [s/p CABG in 1990], heart failure with reduced ejection fraction [HFrEF with LVEF 30 to 35% and grade 1 diastolic dysfunction], systemic hypertension, benign prostatic hyperplasia, spinal stenosis, hyperlipidemia and chronic kidney disease stage IIIb [baseline serum creatinine 1.5 mg/dL], who came in from the [facility] for further evaluation of worsening left hip pain, aggressive behavior and confusion. EMS arrived, they found patient was hypoglycemic and was given dextrose.  Vital signs were stable at presentation with blood pressure 126/86 mmHg.  There is no clear history of falling but there was bruising over the left hip.    Laboratory studies were remarkable for serum potassium 6.3 mmol/L and BUNs/creatinine 100/5.6 mg/dL and hence nephrology consultation.  Hyperkalemia was treated medically with dextrose and insulin overnight as well as Kayexalate.  Serum potassium is improved to 5.8 mmol/L this morning.  Indwelling Bergeron catheter was placed in the emergency room last night he also has an NG tube due to inability to take oral medications.  Home medications include potassium chloride, spironolactone, Furosemide and sacubitril/valsartan [Entresto].  He remains pleasantly confused and appears clinically dehydrated.    He is on room air and does not appear to be in acute distress.    Scheduled Meds:   polyethylene glycol  17 g Oral BID    metoprolol tartrate  25 mg Oral BID 
    Department of Internal Medicine  Nephrology Jadiel Villarreal MD  Progress Note    Reason for consultation: Management of Hyperkalemia and acute kidney injury.       Requesting physician: Latosha Ocampo MD.    Interval history: Patient was seen and examined today and overall he is slightly better.  He was transferred to ICU yesterday due to requirement for intravenous pressors and is currently on Levophed drip at 8 mcg/min.  He is not oliguric and pressors have been titrated down.  Laboratory studies were reviewed.    History of present illness:: This is a 89 y.o. male with a significant past medical history of type 2 diabetes mellitus, dementia,  coronary artery disease [s/p CABG in 1990], heart failure with reduced ejection fraction [HFrEF with LVEF 30 to 35% and grade 1 diastolic dysfunction], systemic hypertension, benign prostatic hyperplasia, spinal stenosis, hyperlipidemia and chronic kidney disease stage IIIb [baseline serum creatinine 1.5 mg/dL], who came in from the [facility] for further evaluation of worsening left hip pain, aggressive behavior and confusion. EMS arrived, they found patient was hypoglycemic and was given dextrose.  Vital signs were stable at presentation with blood pressure 126/86 mmHg.  There is no clear history of falling but there was bruising over the left hip.    Laboratory studies were remarkable for serum potassium 6.3 mmol/L and BUNs/creatinine 100/5.6 mg/dL and hence nephrology consultation.  Hyperkalemia was treated medically with dextrose and insulin overnight as well as Kayexalate.  Serum potassium is improved to 5.8 mmol/L this morning.  Indwelling Bergeron catheter was placed in the emergency room last night he also has an NG tube due to inability to take oral medications.  Home medications include potassium chloride, spironolactone, Furosemide and sacubitril/valsartan [Entresto].  He remains pleasantly confused and appears clinically dehydrated.    He is on room air and does 
    [facility] Internal Medicine  Valente León MD; Ryan Deluna MD, Franki Hung MD, Latosha Ocampo MD, Dr. DAHLIA Castellon; Aure Morales MD    [facility] Internal Medicine   IN-PATIENT SERVICE   [facility]     Progress Note    3/31/2025    10:32 AM    Name:   Michael Mcghee  MRN:     [id number]     Acct:      [id number]   Room:   2123/2123-01  IP Day:  8  Admit Date:  3/22/2025  6:59 PM    PCP:   Bruno Metcalf, APRN - CNP  Code Status:  DNR-CCA    Subjective:     C/C:   Chief Complaint   Patient presents with    Aggressive Behavior    Hypoglycemia    Hip Pain     Interval History Status: not changed.     Seen and examined this morning,  Patient does not follow commands    Brief History:   89-year-old male initially presented on 3/22/2025 from a facility for agitation. On arrival here patient was noted to be in acute renal failure with severe electrolyte abnormalities, rhabdomyolysis, 1 episode of dialysis on 3/24. Also noted to be in shock, requiring pressor support. 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 Being treated for pneumonia as well. Neurology was also consulted for acute metabolic encephalopathy, signed off yesterday, medications adjusted for seizure.  Septic shock improved patient weaned off of Levophed, acute kidney injury, dialysis catheter was removed on March 29,  Patient having some diarrhea gastroenterology on board, planning for colonoscopy, overall prognosis very poor, upper extremity swelling possible due to malnutrition  Transferred out of ICU on March 30    Review of Systems:       Medications:     Allergies:    Allergies   Allergen Reactions    Trospium Chloride Diarrhea       Current Meds:   Scheduled Meds:    metoprolol tartrate  25 mg Oral BID    tamsulosin  0.4 mg Oral Nightly    Or    doxazosin  1 mg Per NG tube Nightly    
    [facility] Internal Medicine  Valente León MD; Ryan Deluna MD, Franki uHng MD, Latosha Ocampo MD, Dr. DAHLIA Castellon; Aure Morales MD    [facility] Internal Medicine   IN-PATIENT SERVICE   [facility]     Progress Note    4/5/2025    11:56 AM    Name:   Michael Mcghee  MRN:     [id number]     Acct:      [id number]   Room:   2012/2012-01  IP Day:  13  Admit Date:  3/22/2025  6:59 PM    PCP:   Bruno Metcalf, KINDRA - CNP  Code Status:  DNR-CCA    Subjective:     C/C:   Chief Complaint   Patient presents with    Aggressive Behavior    Hypoglycemia    Hip Pain     Interval History Status: not changed.     Seen and examined this morning,  Patient does not follow commands    Brief History:   89-year-old male initially presented on 3/22/2025 from a facility for agitation. On arrival here patient was noted to be in acute renal failure with severe electrolyte abnormalities, rhabdomyolysis, 1 episode of dialysis on 3/24. Also noted to be in shock, requiring pressor support. 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 Being treated for pneumonia as well. Neurology was also consulted for acute metabolic encephalopathy, signed off yesterday, medications adjusted for seizure.  Septic shock improved patient weaned off of Levophed, acute kidney injury, dialysis catheter was removed on March 29,  Patient having some diarrhea gastroenterology on board, planning for colonoscopy, overall prognosis very poor, upper extremity swelling possible due to malnutrition  Transferred out of ICU on March 30    Review of Systems:       Medications:     Allergies:    Allergies   Allergen Reactions    Trospium Chloride Diarrhea       Current Meds:   Scheduled Meds:    fludrocortisone  0.1 mg Oral Daily    hydrocortisone sodium succinate PF  25 mg IntraVENous Q8H    furosemide  40 mg 
   03/26/25 1059   Rituals, Rites and Sacraments   Type Blessings;Sacrament of Sick       
   03/27/25 1030   Encounter Summary   Encounter Overview/Reason Spiritual/Emotional Needs   Service Provided For Patient   Referral/Consult From Palliative Care   Support System Family members   Last Encounter  03/27/25   Complexity of Encounter Low   Spiritual/Emotional needs   Type Spiritual Support   Palliative Care   Type Palliative Care, Follow-up   Assessment/Intervention/Outcome   Assessment Unable to assess   Intervention Prayer (assurance of)/[place]       
   03/28/25 1236   Encounter Summary   Encounter Overview/Reason Volunteer Encounter   Assessment/Intervention/Outcome   Intervention Prayer (assurance of)/[place]       
   GI Progress notes    3/30/2025   7:57 AM    Name:  Michael Mcghee  MRN:    [id number]     Acct:     [id number]   Room:  2008/2008-01   Day: 7     Admit Date: 3/22/2025  6:59 PM  PCP: Bruno Metcalf, KINDRA - [place]    Subjective:     C/C:   Chief Complaint   Patient presents with    Aggressive Behavior    Hypoglycemia    Hip Pain       Interval History: Status: not changed.     Patient seen and examined.  No acute events overnight.  Hgb stable  Tolerating TF  No abdominal pain  Having BM    ROS:  Constitutional: negative for chills, fevers and sweats  Respiratory: negative for cough, dyspnea on exertion, hemoptysis, shortness of breath and wheezing  Cardiovascular: negative for chest pain, chest pressure/discomfort, dyspnea, lower extremity edema and palpitations  Gastrointestinal: negative for abdominal pain, constipation, diarrhea, nausea and vomiting  Neurological: negative for dizziness and headaches    Medications:     Allergies:   Allergies   Allergen Reactions    Trospium Chloride Diarrhea       Current Meds: valproate (DEPACON) 250 mg in sodium chloride 0.9 % 100 mL IVPB, Q6H  lamoTRIgine (LAMICTAL) tablet 25 mg, Daily  norepinephrine (LEVOPHED) 16 mg in sodium chloride 0.9 % 250 mL infusion (premix), Continuous  oxyCODONE-acetaminophen (PERCOCET) 5-325 MG per tablet 1 tablet, Q4H PRN  venlafaxine (EFFEXOR) tablet 37.5 mg, BID  hydrOXYzine HCl (ATARAX) tablet 50 mg, 4x Daily PRN  midodrine (PROAMATINE) tablet 10 mg, TID WC  piperacillin-tazobactam (ZOSYN) 4,500 mg in sodium chloride 0.9 % 100 mL IVPB (addEASE), Once  heparin (porcine) 1000 UNIT/ML injection 1,100 Units, PRN  heparin (porcine) 1000 UNIT/ML injection 1,400 Units, PRN  sodium chloride flush 0.9 % injection 5-40 mL, 2 times per day  sodium chloride flush 0.9 % injection 5-40 mL, PRN  0.9 % sodium chloride infusion, PRN  lidocaine PF 1 % injection 50 mg, Once  heparin (porcine) injection 5,000 Units, 3 times per day  ondansetron (ZOFRAN-ODT) 
  [facility] PULMONARY,CRITICAL CARE & SLEEP   Shonjack Arriaga MD/Jonathon ARGUELLES AGACNP-BC, NP-C       Karin ARGUELLES NP-C      Hakan ARGUELLES NP-C                                  Pulmonary Critical Care Progress Note    Patient - Michael Mcghee   Age - 89 y.o.   - 1935  MRN - [id number]  [facility] - [id number]  Date of Admission - 3/22/2025  6:59 PM    Consulting Service/Physician:       Primary Care Physician: Bruno Metcalf, KINDRA - CNP    SUBJECTIVE:     Chief Complaint:   Chief Complaint   Patient presents with    Aggressive Behavior    Hypoglycemia    Hip Pain   Shock/CHF  Subjective:    Patient seen at bedside.  Currently on nasal cannula oxygen.  Not normally on oxygen.  Per family he has had a steady decline in health over the last 2 years.  He is currently very disoriented.  He thinks his 2 family members at bedside are neighbors.  He thought he was at [facility].  He thought the year was .  He did recall that he was born in [place].  He recalled that he went to [employer] school and he recalls that he was born at [facility].  He just does not retain any current events.  He is not who over the current president is.  His only complaint is bilateral ankle pain.  He apparently had some bad ankle injuries working as a  years ago with multiple orthopedic surgeries per his family.  VITALS  /83   Pulse (!) 101   Temp 97.6 °F (36.4 °C) (Axillary)   Resp 23   Ht 1.778 m (5' 10\")   Wt 107 kg (235 lb 14.3 oz)   SpO2 97%   BMI 33.85 kg/m²   Wt Readings from Last 3 Encounters:   25 107 kg (235 lb 14.3 oz)   25 92.1 kg (203 lb)   24 85.4 kg (188 lb 3.2 oz)     I/O (24 Hours)    Intake/Output Summary (Last 24 hours) at 2025 0730  Last data filed at 2025 0606  Gross per 24 hour   Intake 1619.47 ml   Output 275 ml   Net 1344.47 ml     Ventilator:   Settings  FiO2 : 30 %  Insp Rise Time (%): 2 
..    Palliative Care Progress Note    NAME:  Michael Mcghee  MEDICAL RECORD NUMBER:  [id number]  AGE: 89 y.o.   GENDER: male  : 1935  TODAY'S DATE:  3/26/2025    Reason for Consult:  goals of care      Plan        Palliative Interaction: I went to see patient and he was lying in bed and nursing was at bedside assisting with NG flushes/feeds. He has brito in place with some UO. I received update from RN that he has not required anymore HD and that they continue to try to wean pressor. Per Nephro note with IV fluids and midodrine on as well. Kidney function is improving slowly. He is getting IV ATB. No family present.     I reached out to Marianne and reminded her of my role. She reports not being available until 1 pm today but is agreeable to speak with writer now. She reports that she received update yesterday but not today. I provided her with the above update.     Marianne reports being ok with current plan of care. She confirms DNRCC-A no intubation code status with me.     I offered her support at this time.     Education/support to staff  Education/support to family  Providing support for coping/adaptation/distress of family  Continue with current plan of care  Clarification of medical condition to patient and family  Code status clarified: DNRCCA  Palliative care orders introduced  Continued communication updates    Principle Problem/Diagnosis:  Acute renal failure (ARF)    Additional Assessments:  Principal Problem:    Acute renal failure (ARF)  Active Problems:    Counseling regarding advanced care planning and goals of care    Dementia (HCC)    Hypoglycemia    Hyperkalemia    Elevated liver enzymes    History of seizure    Toxic metabolic encephalopathy  Resolved Problems:    * No resolved hospital problems. *    1- Symptom management/ pain control     Pain Assessment:  The patient is not having any pain.               Anxiety:  none                          Dyspnea:  acute dyspnea- NC                     
..PALLIATIVE CARE TEAM    Patient: Michael Mcghee  Room: 2012/2012-01    Reason For Consult   Goals of care evaluation  Distress management  Symptom Management  Guidance and support  Facilitate communications  Assistance in coordinating care  Recommendations for the above    Impression: Michael Mcghee is a 89 y.o. 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  Currently hospitalized for the management of acute renal failure.  The Palliative Care Team is following to assist with goals of care and support.     Code Status  DNR-CCA  PLAN:   - the patient is alert to person and he is pleasant and converses  -there is no family in the room  - he has many comorbidities and his family does not want hospice and the plan my be possible LTACH   - I talk with Marianne CALHOUN and she and sukumar Greenberg do not want him going to LTACH, they want him to return to [place]   - will follow for goals of care and support.    Vital Signs:  /68   Pulse (!) 128   Temp 97.6 °F (36.4 °C) (Oral)   Resp 24   Ht 1.778 m (5' 10\")   Wt 103.7 kg (228 lb 9.9 oz)   SpO2 100%   BMI 32.80 kg/m²     Patient Active Problem List   Diagnosis    Spinal stenosis, lumbar region, without neurogenic claudication    Arthropathy, unspecified, other specified 
..PALLIATIVE CARE TEAM    Patient: Michael Mcghee  Room: 2123/2123-01    Reason For Consult   Goals of care evaluation  Distress management  Symptom Management  Guidance and support  Facilitate communications  Assistance in coordinating care  Recommendations for the above    Impression: Michael Mcghee is a 89 y.o. 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  Currently hospitalized for the management of acute renal failure.  The Palliative Care Team is following to assist with goals of care and support.     Code Status  DNR-CCA  PLAN:   - the patient is confused and in wrist restraints.   - he has an NG in the left nares   - the plan is for him to return to [place]   - he is a DNRCC-A no intubation  - his overall prognosis per primary progress note is very poor.   - will follow for goals of care and support.   Vital Signs:  /60   Pulse 94   Temp 98.4 °F (36.9 °C) (Axillary)   Resp 20   Ht 1.778 m (5' 10\")   Wt 99.8 kg (220 lb 0.3 oz)   SpO2 99%   BMI 31.57 kg/m²     Patient Active Problem List   Diagnosis    Spinal stenosis, lumbar region, without neurogenic claudication    Arthropathy, unspecified, other specified sites    CAD (coronary artery disease)    Primary hypertension    Atherosclerosis of autologous 
..PALLIATIVE CARE TEAM    Patient: Michael Mcghee  Room: 2123/2123-01    Reason For Consult   Goals of care evaluation  Distress management  Symptom Management  Guidance and support  Facilitate communications  Assistance in coordinating care  Recommendations for the above    Impression: Michael Mcghee is a 89 y.o. 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  Currently hospitalized for the management of acute renal failure.  The Palliative Care Team is following to assist with goals of care and support.     Code Status  DNR-CCA  PLAN:   - the patient is alert to person   - his renal status has improved and the dialysis catheter was removed   - he will return to [place] at discharge   - per speech and her swallow evaluation, she does recommend a MBSS  - will follow for goals of care and support.   Vital Signs:  BP (!) 91/57   Pulse 100   Temp 98.4 °F (36.9 °C) (Axillary)   Resp 22   Ht 1.778 m (5' 10\")   Wt 99.8 kg (220 lb)   SpO2 97%   BMI 31.57 kg/m²     Patient Active Problem List   Diagnosis    Spinal stenosis, lumbar region, without neurogenic claudication    Arthropathy, unspecified, other specified sites    CAD (coronary artery disease)    Primary hypertension    Atherosclerosis of autologous vein coronary 
0155 Writer and second RN Kleidine in room to change patient's PICC line and Kumar dressings. Patient agitated, hitting both RN's with mitt restraints on, and unable to be redirected at this time.     0157 Zyprexa administered at this time for agitation.   
ADMIT TO PCU      Admit from: ER    Residence prior to admit: [place]    Details that led to admit: altered mental status, left hip pain    Patient is in bed.  Patient is A&Ox1 and in no distress at this time.  Bed in lowest and locked position.    Call light within patient's reach.    Telemetry applied.  See posted strip in the chart.  Vitals obtained at time of admit to PCU    Temp - 98.4 axillary  HR - 70  RR - 20  BP - 190/50 JASON  SpO2 - 92%  
ATB zyvox moved to 2000 d/t pt in dialysis.  
Agree with Charleen Thomas RN's charting  
As required by CMS, I notified the attending physician restraints were ordered on 03/30/2025 for Michael Mcghee. Acknowledgement of this order by the attending physician was confirmed.   
Attempted ABG, patient combative and flailing arm, only able to obtain venous blood sample. Results given to RN.  
Attempted echo at bedside, pt being transferred to ICU, will attempt later.  
B LJ Dodd notified of high residuals from NG and placed to suction  
B/P 97/61 in dialysis.  
Bedside rounding done with Dr. Villarreal. Writer updated MD on pt current vitals + I&O. Per MD, ok to remove dialysis cath today.  
Bipap on standby at this time.  Pulse Ox--100%   BS-- diminished t/o    Skin score--      Nasal gel pad available at bedside.  Pt awake/alert/no distress noted.         
Blood sugar 93, d10 hanging from floor, sa;line also hanging for procedure    
Blood sugar checks 35 and then 29. Patient's prn D10 bolus given at this time.   
CODE STATUS changed to DNR CC with plan to go to [facility] with hospice.  BiPAP was discontinued.  Pulmonary/critical care will sign off at this time.  Please reconsult for any acute issues or concerns    Karin Aaron NP-C  [facility] Pulmonary and Critical Care  [phone]  
Call received from Dr. Nugent. Order received to give Metoprolo 12.5 mg tonight and change scheduled to 37.5 mg BID. Start in am.   
Called [facility] and spoke with [place]. Gave verbal report. All questions answered.   
Carine Greenberg notified of plans to transfer to ICU.  
Cassandra Akins notified of palliative consult and provided with sukumar Greenberg's phone number.  
Comprehensive Nutrition Assessment    Type and Reason for Visit:  Reassess    Nutrition Recommendations/Plan:   Continue current diet.  Provide Magic Cup with meals.     Malnutrition Assessment:  Malnutrition Status:  At risk for malnutrition (Current medical condition and wounds) (03/25/25 1637)    Context:  Chronic Illness     Findings of the 6 clinical characteristics of malnutrition:  Energy Intake:  Unable to assess  Weight Loss:   (2.6% wt loss in 12 months; may in part be d/t fluid shifts)     Body Fat Loss:  Unable to assess     Muscle Mass Loss:  Unable to assess    Fluid Accumulation:  No fluid accumulation     Strength:  Not Performed    Nutrition Assessment:    Pt in ICU since 4/2, almost non-responsive, presenting decreased appetite; observed breakfast tray untouched; noted 0-25% intake per nursing documentation; Magic Cup with meals reordered this date. Palliative Care indicates Pt is hospice appropriate.    Nutrition Related Findings:    Oliguric. Labs: GFR 48, Gluc 111. Edema: +2 weeping RUE/LUE; +1 non-pitting RLE/LLE. BM 4/4. Steroid, Zosyn, Remeron, Lasix, Glycolax. Wound Type: Multiple, Deep Tissue Injury, Pressure Injury, Stage I, Unstageable       Current Nutrition Intake & Therapies:    Average Meal Intake: 0%, 1-25%  Average Supplements Intake:  (Reordered this date)  ADULT DIET; Dysphagia - Pureed; Moderately Thick (Honey)  ADULT ORAL NUTRITION SUPPLEMENT; Breakfast, Lunch, Dinner; Frozen Oral Supplement    Anthropometric Measures:  Height: 177.8 cm (5' 10\")  Ideal Body Weight (IBW): 166 lbs (75 kg)    Admission Body Weight: 83.9 kg (184 lb 15.5 oz) (Not Specified)  Current Body Weight: 104.8 kg (231 lb 0.7 oz), 122.8 % IBW. Weight Source: Bed scale  Current BMI (kg/m2): 33.2  Usual Body Weight: 95 kg (209 lb 7 oz) (3/16/2024 (~12mo.) Bed)     % Weight Change (Calculated): -2.6  Weight Adjustment For: No Adjustment                 BMI Categories: Obese Class 1 (BMI 
Comprehensive Nutrition Assessment    Type and Reason for Visit:  Reassess    Nutrition Recommendations/Plan:   Resume feeding when appropriate using Renal (Nepro 1.8) tube-feeding to goal rate of 45 mL/hr + TF20 once daily.   Regimen provides 2024 kcal, 107 gm protein.   NPO.      Malnutrition Assessment:  Malnutrition Status:  At risk for malnutrition (Current medical condition and wounds) (03/25/25 1637)    Context:  Chronic Illness     Findings of the 6 clinical characteristics of malnutrition:  Energy Intake:  Unable to assess  Weight Loss:   (2.6% wt loss in 12 months; may in part be d/t fluid shifts)     Body Fat Loss:  Unable to assess     Muscle Mass Loss:  Unable to assess    Fluid Accumulation:  No fluid accumulation     Strength:  Not Performed    Nutrition Assessment:    Pt back in PCU, was transferred out from ICU on 3/30, was NPO for colonoscopy today. Pt was on continuous Renal formula with goal rate of 55 mL/hr + one protein modular daily. Had dialysis catheter removed on 3/29. Palliative care consulted.    Nutrition Related Findings:    Diarrhea; BM 3/31. Edema: +3 pitting RUE; +1 non-pitting LUE. Duoneb, Remeron, Zosyn. Labs reviewed. Wound Type: Multiple, Deep Tissue Injury, Pressure Injury, Stage I, Unstageable       Current Nutrition Intake & Therapies:    Average Meal Intake: NPO  Average Supplements Intake: NPO  Diet NPO Exceptions are: Sips of Water with Meds  Current Tube Feeding (TF) Orders:  Feeding Route: Nasogastric  Formula: Renal Formula  Schedule: Continuous  Feeding Regimen: Goal of 45 mL per hr  Additives/Modulars: Protein (TF20)  Water Flushes: 30 mL water every 4 hr and before and after TF20 administration  Current TF Provides: Goal will provide: 2024 kcal and 107 gm protein (Nepro and TF20)    Anthropometric Measures:  Height: 177.8 cm (5' 10\")  Ideal Body Weight (IBW): 166 lbs (75 kg)    Admission Body Weight: 83.9 kg (184 lb 15.5 oz) (Not Specified)  Current Body Weight: 
Comprehensive Nutrition Assessment    Type and Reason for Visit:  Reassess    Nutrition Recommendations/Plan:   Will provide Pureed diet with Nectar thick liquids  Will add Magic Cup twice daily     Malnutrition Assessment:  Malnutrition Status:  At risk for malnutrition (Current medical condition and wounds) (03/25/25 1637)    Context:  Chronic Illness     Findings of the 6 clinical characteristics of malnutrition:  Energy Intake:  Unable to assess  Weight Loss:   (2.6% wt loss in 12 months; may in part be d/t fluid shifts)     Body Fat Loss:  Unable to assess     Muscle Mass Loss:  Unable to assess    Fluid Accumulation:  No fluid accumulation     Strength:  Not Performed    Nutrition Assessment:    Pt passed swallow eval for pureed diet iwth Nectar thick liquids however refused L because he wasn't hungry. Renal function is improved.    Nutrition Related Findings:    Moderate to severe BUE edema, labs/meds: Reviewed,  3/31 Wound Type: Multiple, Deep Tissue Injury, Pressure Injury, Stage I, Unstageable       Current Nutrition Intake & Therapies:    Average Meal Intake: 0%  Average Supplements Intake: NPO  ADULT DIET; Dysphagia - Pureed; Mildly Thick (Nectar)    Anthropometric Measures:  Height: 177.8 cm (5' 10\")  Ideal Body Weight (IBW): 166 lbs (75 kg)    Admission Body Weight: 83.9 kg (184 lb 15.5 oz) (Not Specified)  Current Body Weight: 95.3 kg (210 lb 1.6 oz), 122.8 % IBW. Weight Source: Bed scale  Current BMI (kg/m2): 30.1  Usual Body Weight: 95 kg (209 lb 7 oz) (3/16/2024 (~12mo.) Bed)     % Weight Change (Calculated): -2.6  Weight Adjustment For: No Adjustment                 BMI Categories: Overweight (BMI 25.0-29.9) (Based on admission wt)    Estimated Daily Nutrient Needs:  Energy Requirements Based On: Formula  Weight Used for Energy Requirements:  (3/24)  Energy (kcal/day): 2100 based on [place]-St. Jeor with 1.3 factor  Weight Used for Protein Requirements:  (3/24)  Protein (g/day): 102-120 
DANGELO results sent to GI NP.  
DARIN Gtz notified of pts potassium of 3.4.  
Discussed with patient's WANDA Lopes on phone, she is agreeable to change the CODE STATUS to DNR CC and enrolled with hospice, CODE STATUS changed  
Dr Morales and writer sukumar Marion, at bedside. Questions answered to her satisfaction.  
Dr Morales aware of how alert and talkative pt is, much improvement compared to yesterday. Aware speech will eval around 11am. Pt is wheezy and edematous more on the R arm than L, where picc line is. RN suggests possible doppler to r/o blood clot. Aware of need for alteplase for white port on picc is clotted. No new orders at this time.  
Dr. Bowser updated.  
Dr. Brown updated on status of patient. MD new orders to increase midodrine to 15 mg TID, make patient full ICU status, and start levophed with SBP goal >90  
Dr. Ivan Ratliff (neuro) notified of consult.   
Dr. Ocampo at bedside and notified that patients BP was running low in the 60/70's. Orders received for 500ml NS bolus and 5mg of Midodrine.   
Dr. Ocampo notified notified of hypotension, received orders for 250 mL bolus.  
Dr. Sellers notified of HR sustaining 130-140's and hypotension. New orders for EKG.      
Dr. Sellers with Cardiology notified of EKG changes over night as well as potassium results this morning. No new orders at this time and EKG strips placed in patient chart.   
Dr. Villarreal notified of critical potassium 2.9. Also updated of transfer to ICU due to persistent hypotension. Orders received for 40mEq IV potassium, levophed for SBP > 95, daily BMP, continue NS @ 100ml/h. Requested all IVF to be managed by nephrology.  
Dr. Villarreal notified of pts potassium level. Orders received to infuse 40 meq of potassium IVPB.  
Dr. Villarreal notified of repeat potassium of 3.4, new order received for 30 meq  
Dr. Villarreal rounding at the bedside. Orders to check a urine chloride and notify Dr. Villarreal of the results.   
Dr. Villarreal updated on patient's phosphorus results. See new orders.   
Dr. Villarreal updated on patient's swelling and CXR results. Orders to discontinue IV fluid.   
Dr. Villarreal updated on patient's urine chloride results and total urine output. See new orders.   
EDWARD Mcgrath answered call from family member Yari that she had gotten call pt in restraints. Yari stated, \"I'm ok with that. It shows he is getting better because he is fighting.\"  
EEG completed. Full physicians report to follow.  
EKG results faxed to Dr. Sellers at [phone]  
Extensive conversations with patient's niece, Yari, at bedside. All treatments and current medications explained. She is requesting to be updated if there is a need for a second vasopressor. Will notify night shift of requests.  
Extensive discussion with patient sukumar Greenberg, Dr. Platt and Dr. Ocampo at bedside regarding plan of care. Care is becoming futile. Patient sukumar Greenberg told writer that she will speak with Marianne but will likely move towards hospice route later today. Care ongoing.   1247: Palliative notified of the above conversation. They are not in house on the weekends, but are available over the phone if anything is needed.   
Extensive lengthy discussion with patient's niece Yari at bedside. Plan of care discussed with Yari. Updates provided on plan of care. Awaiting doctors to round on patient.   
Family requesting to speak with cardiology. Writer sent secure message to cardiology to see if they can come speak with family at bedside.   
Glucose 68 after IM glucagon and D10 infusion @ 50 mL/hr. Teresita CALHOUN notified. New orders for BMP to assess glucose level.     2237 Glucose 130 on BMP. Teresita CALHOUN notified and writer shut of D10 infusion at this time.   
HEMODIALYSIS POST TREATMENT NOTE    Treatment time ordered: 2 hours    Actual treatment time: 2 hours    UltraFiltration Goal: 0  UltraFiltration Removed: +904      Pre Treatment weight: 91.6kg  Post Treatment weight: 92.5kg  Estimated Dry Weight: TBD NADIR    Access used:     Central Venous Catheter: right chest non tunneled catheter placed today.     Internal Access: na       Access Flow: good, 300 per order     Sign and symptoms of infection: na       If YES: na    Medications or blood products given: Midodrine 10mg NG    Regular outpatient schedule: TBD NADIR    Summary of response to treatment: Patient tolerated treatment poorly. Patient's systolic blood pressures dropped into the 70's within 20 minutes of starting treatment. Uf turned off, gave 200ml normal saline bolus, bp recheck not improved, gave another 200ml bolus of normal saline. Blood pressures slightly improved.  Blood sugar also going down, Primary RN gave subq injection and an IV bolus of dextrose, blood sugars improved. Placed patient in reverse trendelenburg and blood pressures improved. Patient responding to staff entire time, confused, not oriented. UF remained off rest of treatment, blood presssures continued to rise. No other issues.    Explain if orders NOT met, was physician notified:yes, Notified Dr. Villarreal, wanted me to take patient off treatment, treatment already completed.      ACES flowsheet faxed to patient unit/ placed in patient chart: yes    Post assessment completed: yes    Report given to: Nitza Vaz      * Intra-treatment documented Safety Checks include the followin) Access and face visible at all times.     2) All connections and blood lines are secure with no kinks.     3) NVL alarm engaged.     4) Hemosafe device applied (if applicable).     5) No collapse of Arterial or Venous blood chambers.     6) All blood lines / pump segments in the air detectors.    
Hospice consult placed by Dr. Ocampo.   
I called the patient's stated HCPOA Marianne Abdi at [phone] and I introduce my palliative care role to her. I remind her that I worked with her last 2024 when Ed was admitted, and that I called Ed's  Shaun Hilliardoman  and he emailed me Ed's HCPOA/ LW that was completed in May of 2008. I update Marianne that I remember that Marianne is the daughter of Ed's  wife's sister Ifeoma, and she states\" yes.\"   I ask about her waiving her rights for medical decisions, and update her that    does not just automatically have Yari be his primary decision maker as well for medical decisions.   If she truly wants to waive her rights as she is listed on the legal document,it would go to the majority of the patient's nieces and nephews. I explain that the legal document appointed Cathy his wife and then you Marianne as secondary and now the primary decision maker as Cathy is .   Marianne states\" ok I will keep my rights as Yari and I work well together to advocate for Ed.\"   I update Marianne that it will be better for Ed and decisions for him, as well as better for family as then many people will be involved. Marianne states\" yes I understand and I will remain to execute my rights and work with Yari together.\" Marianne states\" that she will remain the HCPOA as it is stated.\" I remind Marianne that the decisions can be made between the 2 of them, though she has to be the voice to the doctors and the hospital while he is here, and she states\" I understand.\"     I go to the bedside and I meet Yari, and she states that she did talk to Marianne and that Marianne will remain the legal HCPOA, and that she and Marianne will work together. Marianne and Yari are the contacts on the chart as well for HIPAA.       Will follow for goals of care and support.         ..[facility]  Yadira Lopez RN,CHPN   [facility]: [phone]  [facility]: [phone]  [facility]: [phone]   
I go to the bedside and the patient has again worsened. He is hypotensive, and he is now on levophed, he would not take his pills this am. His kidney numbers are climbing again and per there nephrology notes he has evidence of decreased arterial volume. Ed is not responding and is lethargic.Per Fatoumata he was then agitated, and took some pills and at this time would not take the rest.  Per nephrology his prognosis is guarded to poor.   Per there primary care update and note his overall prognosis is very poor, and he is hospice appropriate.     I call Marianne the patient's niece who is HCPOA. I update her that Ed is doing poor again, and that he has not responded well to treatment and that I believe he is suffering. I tell Marianne that I have not seen Yari as I was just at the bedside. I advise that they make a decision for Ed to make him a DNRCC and let him live his life out, and be comfortable and consider hospice for him. I update Marianne that it is their company of choice. I advise her to talk with Yari and get a decision made for him.       Will follow for goals of care and patient/family support.     ADDENDUM:   I see the niece Yari and we talk privately in the our office. We talked at length and she is hopeful that they can remove the fluid and that per the conversation she said the medical staff is all over the place and she states \" it's like whip lash.\"     I explain that we have tried treatments to stabilize, and now we are back where we started. Yari keeps telling me that the cardiology team states\" he has improved.\" Yari states that cardiology states it is up to nephrology and they will lead the fluids and the treatments for his kidneys and get some of the \" water off.\"     Yari continues to talk about answers and I ask her \" what about Ed?\" He is laying there in an ICU bed and there is no cure for him. I ask Yari what is so wrong to get him out of here, to [facility] with hospice care. Yari calls 
I received multiple complaints from nursing staff and physicians regarding patient's niece, Yari, being very argumentative with staff.  In addition to being argumentative, I received complaints that Yari was being disruptive to and inhibiting patient's care.  I reached out to patient's Healthcare POA, Marianne, to discuss these concerns.  I informed Marianne that Yari cannot continue to inhibit patient care and be disruptive.  She also cannot continue to constantly argue with nursing staff and physicians.  I also informed Marianne that Yari is \"demanding\" patient be transferred to another facility.  I told Marianne that since Yari is not healthcare POA, she cannot be \"demanding\" orders.  I asked Marianne to reach out to Yari to discuss these behaviors and to have Yari control these behaviors.  I made clear to Marianne if Yari continues these current behaviors she would be escorted from the facility.  Marianne was very apologetic, thankful for the call and all the care the team is performing.  She stated she will contact Yari to address behaviors.  I made sure Marianne had my contact information in case she had any further questions or concerns.  
I was asked to evaluate the patient regarding Kumar catheter placement for hemodialysis.  Kumar catheter was placed by interventional radiology.  I will sign off the case.  Please call me as needed.  
Jean-Claude Bowser MD (cardiology) notified of consult.   
K. [facility] NP notified patient's BNP is greater than 70,000  
KUB results indicated NG is in the distal end of the esophagus and needed repositioning. NG repositioned to 65cm and CXR ordered to check placement.  
[facility]   Progress Note                   Date:   3/26/2025  Patient name: Michael Mcghee  Date of admission:  3/22/2025  6:59 PM  MRN:   [id number]  YOB: 1935  PCP: Bruno Metcalf, APRN - CNP    Reason for Admission: Hyperkalemia [E87.5]  Hypoglycemia [E16.2]  Left hip pain [M25.552]  Acute renal failure (ARF) [N17.9]  Acute renal failure, unspecified acute renal failure type [N17.9]    Subjective:       Clinical Changes / Abnormalities: Pt seen and examined in the room.  Pt has been confused but more alert today, per family.  Pt answering questions.  Remains in restraints.   Labs, vitals and tele reviewed.  On pressors.  NG in place       Medications:   Scheduled Meds:   valproate (DEPACON) 500 mg in sodium chloride 0.9 % 100 mL IVPB  500 mg IntraVENous Q12H    venlafaxine  37.5 mg Oral BID    linezolid  600 mg IntraVENous Q12H    midodrine  10 mg Oral TID WC    piperacillin-tazobactam  4,500 mg IntraVENous Once    Followed by    piperacillin-tazobactam  3,375 mg IntraVENous Q12H    sodium chloride flush  5-40 mL IntraVENous 2 times per day    lidocaine 1 % injection  50 mg IntraDERmal Once    heparin (porcine)  5,000 Units SubCUTAneous 3 times per day    [Held by provider] atorvastatin  20 mg Oral Nightly    mirtazapine  7.5 mg Oral Nightly    tamsulosin  0.4 mg Oral Nightly     Continuous Infusions:   sodium chloride 100 mL/hr at 03/26/25 0302    norepinephrine 8 mcg/min (03/26/25 0807)    sodium chloride      sodium chloride      dextrose       CBC:   Recent Labs     03/24/25  0319 03/25/25  0611 03/26/25  0648   WBC 19.0* 13.9* 15.9*   HGB 12.1* 10.2* 11.7*    143* 198     BMP:    Recent Labs     03/25/25  0611 03/25/25  1441 03/25/25  2148 03/26/25  0648    142  --  139   K 3.6* 2.8* 3.0* 3.6*    108*  --  108*   CO2 22 22  --  19*   BUN 54* 53*  --  46*   CREATININE 3.3* 3.0*  --  2.6*   GLUCOSE 134* 76  --  158*     Hepatic:   Recent Labs     
[facility]   Progress Note                   Date:   4/3/2025  Patient name: Michael Mcghee  Date of admission:  3/22/2025  6:59 PM  MRN:   [id number]  YOB: 1935  PCP: Bruno Metcalf, APRN - CNP    Reason for Admission: Hyperkalemia [E87.5]  Hypoglycemia [E16.2]  Left hip pain [M25.552]  Acute renal failure (ARF) [N17.9]  Acute renal failure, unspecified acute renal failure type [N17.9]    Subjective:       Clinical Changes / Abnormalities: Pt seen and examined in the room with daughter after discussion with RN. Asked to revisit d/t elevated pBNP. Tx back to intermediate ICU yesterday d/t lethargy. Labs, vitals, & tele reviewed. On exam patient alert and cooperative. On BiPap but conversing. Mild tachycardia noted with     Medications:   Scheduled Meds:   hydrocortisone sodium succinate PF  25 mg IntraVENous Q8H    polyethylene glycol  17 g Oral BID    metoprolol tartrate  25 mg Oral BID    tamsulosin  0.4 mg Oral Nightly    Or    doxazosin  1 mg Per NG tube Nightly    valproate (DEPACON) 250 mg in sodium chloride 0.9 % 100 mL IVPB  250 mg IntraVENous Q6H    lamoTRIgine  25 mg Oral Daily    venlafaxine  37.5 mg Oral BID    midodrine  10 mg Oral TID WC    piperacillin-tazobactam  4,500 mg IntraVENous Once    sodium chloride flush  5-40 mL IntraVENous 2 times per day    heparin (porcine)  5,000 Units SubCUTAneous 3 times per day    [Held by provider] atorvastatin  20 mg Oral Nightly    mirtazapine  7.5 mg Oral Nightly     Continuous Infusions:   [Held by provider] dextrose 75 mL/hr at 04/02/25 2228    sodium chloride 200 mL (03/31/25 0343)    dextrose 50 mL (03/30/25 2249)     CBC:   Recent Labs     04/03/25  0323   WBC 6.6   HGB 9.0*   *     BMP:    Recent Labs     04/01/25  0535 04/02/25  0553 04/03/25  0323    137 138   K 3.7 4.1 4.0   * 111* 106   CO2 24 21 21   BUN 14 15 13   CREATININE 1.1 1.1 1.2   GLUCOSE 101* 108* 145*     Hepatic:   No results for 
[facility]   Progress Note                   Date:   4/4/2025  Patient name: Michael Mcghee  Date of admission:  3/22/2025  6:59 PM  MRN:   [id number]  YOB: 1935  PCP: Bruno Metcalf, APRN - CNP    Reason for Admission: Hyperkalemia [E87.5]  Hypoglycemia [E16.2]  Left hip pain [M25.552]  Acute renal failure (ARF) [N17.9]  Acute renal failure, unspecified acute renal failure type [N17.9]    Subjective:       Clinical Changes / Abnormalities: Pt seen and examined in the room  Pt ST with hypotension overnight.  Now off pressors.  Pt drowsy.  Remains slightly tachycardia, 110     +21 L since admission   Medications:   Scheduled Meds:   hydrocortisone sodium succinate PF  25 mg IntraVENous Q8H    furosemide  40 mg IntraVENous Once    metoprolol tartrate  37.5 mg Oral BID    midodrine  15 mg Oral TID WC    polyethylene glycol  17 g Oral BID    tamsulosin  0.4 mg Oral Nightly    Or    doxazosin  1 mg Per NG tube Nightly    valproate (DEPACON) 250 mg in sodium chloride 0.9 % 100 mL IVPB  250 mg IntraVENous Q6H    lamoTRIgine  25 mg Oral Daily    venlafaxine  37.5 mg Oral BID    piperacillin-tazobactam  4,500 mg IntraVENous Once    sodium chloride flush  5-40 mL IntraVENous 2 times per day    heparin (porcine)  5,000 Units SubCUTAneous 3 times per day    [Held by provider] atorvastatin  20 mg Oral Nightly    mirtazapine  7.5 mg Oral Nightly     Continuous Infusions:   norepinephrine Stopped (04/04/25 0615)    dextrose Stopped (04/03/25 2237)    sodium chloride 200 mL (03/31/25 0343)    dextrose 50 mL (03/30/25 2249)     CBC:   Recent Labs     04/03/25  0323   WBC 6.6   HGB 9.0*   *     BMP:    Recent Labs     04/03/25  0323 04/03/25  2142 04/04/25  0352    139 137   K 4.0 4.1 4.0    105 106   CO2 21 21 21   BUN 13 14 18   CREATININE 1.2 1.3* 1.4*   GLUCOSE 145* 130* 111*     Hepatic:   No results for input(s): \"AST\", \"ALT\", \"BILITOT\", \"ALKPHOS\" in the last 72 
[facility]  Progress Note      NAME:  Michael Mcghee  MEDICAL RECORD NUMBER:  [id number]  AGE: 89 y.o.   GENDER: male  : 1935  TODAY'S DATE:  3/31/2025    [facility] nurse follow up visit for multiple wounds. Patient continues to be in restraints for safety. NG tube still in place for nutrition/meds. Area of eschar to right lateral foot unchanged. Eschar remains dry and stable. Areas of purple discoloration to left medial ankle, left foot, and left heel have now turned to eschar. All of the areas are dry and there is no drainage or odor. New areas of purple discoloration to right medial foot and right heel. Will apply betadine and keep dry. Right back purple areas have mostly faded. There is still a red area that appears recently closed. Buttocks mildly denuded. There is still some purple discoloration, but this appear to be more chronic discoloration from incontinence, as it has not opened. Patient did do bowel prep, so he has had some loose stools. Will continue current treatments and continue to follow.       Measurements:  Wound 25 Back Right;Lateral (Active)   Wound Image   25 1141   Wound Etiology Deep tissue/Injury 25 1141   Dressing Status Clean;Dry 25 1940   Wound Cleansed Not Cleansed 25 1600   Dressing/Treatment Open to air 25 1141   Wound Assessment Pink/red;Non-blanchable erythema 25 1141   Drainage Amount None (dry) 25 1141   Odor None 25 1141   Pat-wound Assessment Non-blanchable erythema 25 1141   Margins Attached edges 25 1141   Number of days: 8       Wound 25 Buttocks Right;Left (Active)   Wound Image   25 1141   Wound Etiology Deep tissue/Injury 25 1141   Dressing Status Clean;Dry;Intact 25 1940   Wound Cleansed Not Cleansed 25 1600   Dressing/Treatment Triad hydro/zinc oxide-based hydrophilic paste 25 1141   Dressing Change Due 25 0400   Wound Assessment 
NONINVASIVE VENTILATION    PROVIDE OPTIMAL VENTILATION/ACCEPTABLE SPO2   IMPLEMENT NONINVASIVE VENTILATION PROTOCOL   MAINTAIN ACCEPTABLE SPO2   ASSESS SKIN INTEGRITY/BREAKDOWN SCORE   PATIENT EDUCATION AS NEEDED   BIPAP AS NEEDED        
NP responded to writer regarding patient's BNP from this morning. States She will let day shift know in report and diuretics may be added to patients orders pending his kidney values.   
New orders for one time dose PO 12.5 mg metoprolol from Dr. Sellers  
Off floor for colonoscopy.  Pulmonary/critical care will follow-up tomorrow.  Please call us for any acute issues or concerns.    Karin Aaron NP-C  [facility]  
Patient HR sustaining 120-130's after dose of metoprolol. Dr. Sellers notified. No new orders at this time.   
Patient arrived to ICU 2008. Monitors placed and assessment completed.  
Patient attempting to bite, hit and kick staff. Bilateral soft wrist restraints applied. PRN atarax given   
Patient cousin Yari uodated on events. Extensive education given on brito vs external catheter and vasopressors. Visitor commenting how \"you guys don't like it when I ask questions.\" Writer explained extensively for 30 minutes that there is nothing wrong with asking questions, but it starts to prohibit care on other patients when visitor starts arguing for an extended period of time about plan of care, example being how visitor continues to verbally disagree with physician choice to decrease levo from 5 to 2.5, despite extensive education given about why that choice was made. Yari also expressed that she is upset that the  told Isid[facility] nurse manager about the \"confidential\" things her and the  talked about. Writer explained that that information is not confidential and that the  was 100% correct to telling nursing staff about what was said. Visitor continues to argue. Visitor then states \"and I just know this is going to be charted\". Writer educated that everything is always charted for legal and patient care reasons.  
Patient down to IR Dialysis cath.   
Patient has only urinated 200 mL this shift. Bladder scan showing 312 mL and patient refusing to urinate at this time. Creatinine is on the rise at 1.4 this morning but from Dr. Villarreal's note nephrology signed off and will see him PRN. Teresita CALHOUN notified. No new orders at this time and writer to pass this onto day shift in report.  
Patient in preop with restraints and transferred to OR stable  
Patient received from surgery. Vitals taken. reassessment completed. No distress noted.  Call light within reach. Restraints bilat soft wrists maintained dt trying to pull ngt. Bed in lowest position, and locked. Side rails up x 2. Denied further questions or needs at this time. Will continue to monitor.  
Patient refusing a blood sugar check at this time, spitting at staff and swinging his arms to attempt to hit. Patient is not able to be educated d/t confusion and agitation.   
Patient refusing to allow writer to do morning assessment. Patient states that he just wants to sleep. Patient would not answer any further questions from writer. Care ongoing.   
Patient ring removed d/t swelling and placed with belongings in specimen cup w/ label  
Patient to transfer to ICU.   
Patient tolerated non tunneled dialysis catheter placement without distress. Catheter ok to use. Dressing to site. Patient returned to room. Nurse updated.     
Patient transferred with los to ICU RM # [id number].   
Patient transported to intermediate ICU room 2012 with PCU charge RN and PCU RN. Patient attached to monitor, vitals obtained, POC glucose obtained at 137, and RT Tushar hooked patient up to continuous BiPAP. PCU RN at bedside to give report to primary RN. Full assessment by primary RN to follow.  
Patient's BS now @ 92  
Patient's family in agreement with hospice. Attending, palliative and CM notified.   
Patient's niece Yari at bedside. Updates provided.   
Patient's niece Yari called unit for updates on patient. All updates given and no further questions at this time.   
Patient's niece Yari came to nurses station shortly after shift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 decisions will be made with HCPOA/Marianne in the best interest of the patient.     Ethics will continue to follow.      04/01/25 1605   Encounter Summary   Encounter Overview/Reason Ethics/Mediation   Service Provided For Patient   Referral/Consult From Multi-disciplinary team   Support System Family members   Last Encounter  04/01/25   Complexity of Encounter Low   Begin Time 1530   End Time  1600   Total Time Calculated 30 min   Ethics/Mediation   Type Ethics Intake/Triage       
Writer speaks with Dr Morales regarding  findings of colonoscopy, need for restraint order to be reordered, inquired to resume tube feedings, possible peg plan vs hospice. Discussed the need to talk to family tomorrow. Orders received.   
Writer spent over 1 hour with patients sukumar Greenberg, updating on condition and answering all questions   
Writer spoke with Dr. Hahn regarding patient's heart rate/rhythm, blood pressure. Order received for EKG and fax to him. Order also received for Midodrine 10 mg PO now. On coming shift nurse Capone updated on new orders.  
Writer spoke with Dr. Villarreal regarding patient condition, labs and orders. Ok to give Lasix one time dose if SBP greater than 120.   
Writer spoke with patient's WANDA Lopes via phone call. All updates given and no further questions at this time.   
Writer verified with Dr. Villarreal that patient's dialysis catheter can be removed. Per Dr. Villarreal, dialysis catheter can come out.   
Writer walked into room and patient removed bilateral Mitt restraints. Patient educated on importance of keeping mitt on at this. Dementia baseline, but patient states he understands.   
Writer walked into room and patient removed left Mitt restraint. Patient educated on importance of keeping mitt on at this. Dementia baseline, but patient states he understands.   
Yari updated on swallow study results. Questions answered to her satisfaction.  
Yari, patient's niece updated on ICU transfer d/t on going hypotension after boluses, midodrine, NS @ 100. Also informed Yari of EEG, and Dr. Ratliff rounding while EEG was in process but will return to asses patient. Yari had questions about.  
patient's family is very upset. they want the patient moved to another unit with a lower nurse to patient ratio. They expected the patient to have already been moved  Nurse tried to explain to family the process of having a patient moved and that the final decision has to come from an MD and apologized that their expectations were not met. Family still upset said that they've heard apologies already.    they are suggesting [facility].    Nurse tried to update family during this call; and again, family was upset that nurse is updating them at the end of the shift. Nurse spent a lot of time in patient's room as his care requires, but family voiced that it was not acceptable to be so busy, not to be able to call in the middle of my shift. Again, nurse apologized, but it was still not enough for family.  They are upset when the nurse cannot call when they expect for updates.    They are upset that the patient has to have boluses to keep his blood sugars and Pressures within acceptable limits    In Short Yari the neice said \"You are Killing him\" she also does not like how the patient YOYO with blood pressures and blood sugars. nursing spends a lot of time with patient, but family is still not satisfied. they want to talk Nephrology.  Nephrology made aware.  made aware.    Nephrology on their way to see patient  
AMS  Clinical Indicators: WBC 16, 19, 15.9.  LA 4.2 on 3/25 and follow up 1.8.   Afebrile. HR varies from . RR varies from 16-24. SBP as low as 62 with   MAP 59 on VS check 3/25 at time of 1400.  Treatment: Started on Zoysn and Zyvox on 3/24.  Options provided:  -- Sepsis, not present on admission due to, Please document source.  -- Sepsis ruled out  -- Other - I will add my own diagnosis  -- Disagree - Not applicable / Not valid  -- Disagree - Clinically unable to determine / Unknown  -- Refer to Clinical Documentation Reviewer    PROVIDER RESPONSE TEXT:    Sepsis ruled out    Query created by: ROZ ADDISON on 3/26/2025 9:04 AM      Electronically signed by:  Latosha Ocampo MD 3/26/2025 4:05 PM          
Adjustment For: No Adjustment                 BMI Categories: Overweight (BMI 25.0-29.9) (Based on admission wt)    Estimated Daily Nutrient Needs:  Energy Requirements Based On: Formula  Weight Used for Energy Requirements:  (3/24)  Energy (kcal/day): 2100 based on Citrus-St. Jeor with 1.3 factor  Weight Used for Protein Requirements:  (3/24)  Protein (g/day): 102-120 based on 1.1-1.3 gm per kg. May vary with renal function, healing needs, and overall condition.  Method Used for Fluid Requirements: Other  Fluid (ml/day): per physician    Nutrition Diagnosis:   Increased nutrient needs related to  (healing) as evidenced by wounds    Nutrition Interventions:   Food and/or Nutrient Delivery: Modify Tube Feeding  Nutrition Education/Counseling: No recommendation at this time  Coordination of Nutrition Care: Continue to monitor while inpatient       Goals:  Goals: Tolerate nutrition support at goal rate  Type of Goal: Continue current goal  Previous Goal Met: Progressing toward Goal(s)    Nutrition Monitoring and Evaluation:   Behavioral-Environmental Outcomes: None Identified  Food/Nutrient Intake Outcomes: Enteral Nutrition Intake/Tolerance  Physical Signs/Symptoms Outcomes: Biochemical Data, GI Status, Fluid Status or Edema, Skin, Weight    Discharge Planning:    Too soon to determine     Sandra Thomas, RD, LD  Contact: [phone]      
IntraVENous Q1H    valproate (DEPACON) 250 mg in sodium chloride 0.9 % 100 mL IVPB  250 mg IntraVENous Q6H    piperacillin-tazobactam  4,500 mg IntraVENous Once    Followed by    piperacillin-tazobactam  3,375 mg IntraVENous Q8H    lamoTRIgine  25 mg Oral Daily    venlafaxine  37.5 mg Oral BID    linezolid  600 mg IntraVENous Q12H    midodrine  10 mg Oral TID WC    sodium chloride flush  5-40 mL IntraVENous 2 times per day    lidocaine 1 % injection  50 mg IntraDERmal Once    heparin (porcine)  5,000 Units SubCUTAneous 3 times per day    [Held by provider] atorvastatin  20 mg Oral Nightly    mirtazapine  7.5 mg Oral Nightly    tamsulosin  0.4 mg Oral Nightly     Continuous Infusions:   sodium chloride 100 mL/hr at 25 0457    norepinephrine 1 mcg/min (25 0547)    sodium chloride      dextrose       PRN Meds:.oxyCODONE-acetaminophen, hydrOXYzine HCl, heparin (porcine), heparin (porcine), sodium chloride flush, sodium chloride, ondansetron **OR** ondansetron, acetaminophen **OR** acetaminophen, hydrALAZINE, ipratropium 0.5 mg-albuterol 2.5 mg, OLANZapine (ZyPREXA) 2.5 mg in sterile water 0.5 mL injection, glucose, dextrose bolus **OR** dextrose bolus, glucagon (rDNA), dextrose    Physical Exam:    VITALS:  BP (!) 97/51   Pulse 80   Temp 97.4 °F (36.3 °C) (Axillary)   Resp 26   Ht 1.778 m (5' 10\")   Wt 95.3 kg (210 lb 1.6 oz)   SpO2 100%   BMI 30.15 kg/m²   TEMPERATURE:  Current - Temp: 97.4 °F (36.3 °C); Max - Temp  Av.7 °F (36.5 °C)  Min: 97.4 °F (36.3 °C)  Max: 98.5 °F (36.9 °C)  RESPIRATIONS RANGE: Resp  Av.2  Min: 17  Max: 40  PULSE RANGE: Pulse  Av.7  Min: 67  Max: 108  BLOOD PRESSURE RANGE:  Systolic (24hrs), Av , Min:63 , Max:173   ; Diastolic (24hrs), Av, Min:34, Max:151    PULSE OXIMETRY RANGE: SpO2  Av.4 %  Min: 82 %  Max: 100 %  24HR INTAKE/OUTPUT:    Intake/Output Summary (Last 24 hours) at 3/28/2025 0700  Last data filed at 3/28/2025 0552  Gross per 24 hour 
distress.    Scheduled Meds:   hydrocortisone sodium succinate PF  25 mg IntraVENous Q8H    furosemide  40 mg IntraVENous Once    polyethylene glycol  17 g Oral BID    metoprolol tartrate  25 mg Oral BID    tamsulosin  0.4 mg Oral Nightly    Or    doxazosin  1 mg Per NG tube Nightly    valproate (DEPACON) 250 mg in sodium chloride 0.9 % 100 mL IVPB  250 mg IntraVENous Q6H    lamoTRIgine  25 mg Oral Daily    venlafaxine  37.5 mg Oral BID    midodrine  10 mg Oral TID WC    piperacillin-tazobactam  4,500 mg IntraVENous Once    sodium chloride flush  5-40 mL IntraVENous 2 times per day    heparin (porcine)  5,000 Units SubCUTAneous 3 times per day    [Held by provider] atorvastatin  20 mg Oral Nightly    mirtazapine  7.5 mg Oral Nightly     Continuous Infusions:   [Held by provider] dextrose 75 mL/hr at 258    sodium chloride 200 mL (25 0343)    dextrose 50 mL (25 2249)     PRN Meds:.metoprolol, oxyCODONE-acetaminophen, hydrOXYzine HCl, heparin (porcine), heparin (porcine), sodium chloride flush, sodium chloride, ondansetron **OR** ondansetron, acetaminophen **OR** acetaminophen, hydrALAZINE, ipratropium 0.5 mg-albuterol 2.5 mg, OLANZapine (ZyPREXA) 2.5 mg in sterile water 0.5 mL injection, glucose, dextrose bolus **OR** dextrose bolus, glucagon (rDNA), dextrose    Physical Exam:    VITALS:  /71   Pulse (!) 109   Temp 97.6 °F (36.4 °C) (Oral)   Resp 22   Ht 1.778 m (5' 10\")   Wt 103.7 kg (228 lb 9.9 oz)   SpO2 98%   BMI 32.80 kg/m²   TEMPERATURE:  Current - Temp: 97.6 °F (36.4 °C); Max - Temp  Av.3 °F (36.3 °C)  Min: 96.2 °F (35.7 °C)  Max: 97.7 °F (36.5 °C)  RESPIRATIONS RANGE: Resp  Av.3  Min: 0  Max: 31  PULSE RANGE: Pulse  Av.9  Min: 80  Max: 127  BLOOD PRESSURE RANGE:  Systolic (24hrs), Av , Min:64 , Max:123   ; Diastolic (24hrs), Av, Min:34, Max:100    PULSE OXIMETRY RANGE: SpO2  Av.9 %  Min: 91 %  Max: 100 %  24HR INTAKE/OUTPUT:    Intake/Output 
   DR GUEVARA    CARDIAC PROCEDURE N/A 3/15/2024    Left heart cath / coronary angiography performed by Alvarez Gillespie MD at [facility] CARDIAC CATH LAB    CARDIAC SURGERY      TRIPLE BYPASS    CARDIAC SURGERY      STENTS X 5    JOINT REPLACEMENT Right     KNEE REPLACEMENT    NERVE BLOCK  01/21/2014    Duramorph celestone 9 mg        Medications Prior to Admission:     Prior to Admission medications    Medication Sig Start Date End Date Taking? Authorizing Provider   fluconazole (DIFLUCAN) 100 MG tablet Take 1 tablet by mouth daily   Yes Evangelista Buck MD   senna (SENOKOT) 8.6 MG tablet Take 1 tablet by mouth daily    Evangelista Buck MD   divalproex (DEPAKOTE) 125 MG DR tablet Take 1 tablet by mouth 2 times daily  Patient taking differently: Take 1 tablet by mouth 3 times daily 11/27/24   Chaparrita Raphael PA   hydrOXYzine HCl (ATARAX) 50 MG tablet  10/14/24   Evangelista Buck MD   potassium chloride (KLOR-CON M) 10 MEQ extended release tablet  9/18/24   Evangelista Buck MD   traMADol (ULTRAM) 50 MG tablet Take 1 tablet by mouth in the morning, at noon, and at bedtime. 11/19/24   Evangelista Buck MD   ipratropium 0.5 mg-albuterol 2.5 mg (DUONEB) 0.5-2.5 (3) MG/3ML SOLN nebulizer solution Inhale 3 mLs into the lungs every 4 hours as needed for Shortness of Breath 8/5/24   Ryan Deluna MD   calcium carbonate (OSCAL) 500 MG TABS tablet Take 1 tablet by mouth at bedtime    Evangelista Bcuk MD   sacubitril-valsartan (ENTRESTO) 24-26 MG per tablet Take 1 tablet by mouth 2 times daily 7/10/24   Paulie Guevara MD   spironolactone (ALDACTONE) 25 MG tablet Take 0.5 tablets by mouth daily 7/10/24   Paulie Guevara MD   nitroGLYCERIN (NITROSTAT) 0.4 MG SL tablet Place 1 tablet under the tongue every 5 minutes as needed for Chest pain up to max of 3 total doses. If no relief after 1 dose, call 911. 7/10/24   Paulie Guevara MD   venlafaxine (EFFEXOR) 37.5 MG tablet Take 1 tablet by mouth 2 
0.3 mg/dL    Bilirubin, Indirect Can not be calculated 0.0 - 1.0 mg/dL    Total Protein 4.9 (L) 6.6 - 8.7 g/dL     Recent Labs     03/27/25  1656 03/28/25  0713 03/28/25  1605 03/28/25  1826 03/28/25  1926   POCGLU 101 96 66* 33* 119*        XR CHEST PORTABLE  Result Date: 3/26/2025  EXAMINATION: ONE XRAY VIEW OF THE CHEST 3/26/2025 4:58 am COMPARISON: 03/24/2025 radiograph HISTORY: ORDERING SYSTEM PROVIDED HISTORY: Hypotension, sepsis TECHNOLOGIST PROVIDED HISTORY: Hypotension, sepsis Reason for Exam: Hypotension, sepsis Additional signs and symptoms: Hypotension, sepsis Relevant Medical/Surgical History: Hypotension, sepsis FINDINGS: Enteric tube stable.  Interval placement of a right vascular catheter with tip at the level of the right atrium.  A right-sided PICC line has also been placed with tip poorly characterized but appearing at the mid SVC. Cardiomegaly with evidence of prior CABG.  Worsening pattern of severe airspace opacification in the left mid and lower lung zone with increasing left pleural effusion.  Mild vascular congestion slightly improved on the right.  No skeletal abnormality.     Worsening airspace changes in the left lung with increasing pleural effusion.     Echo (TTE) complete (PRN contrast/bubble/strain/3D)  Result Date: 3/25/2025    Left Ventricle: Normal left ventricular systolic function with a visually estimated EF of 55 - 60%. EF by visual approximation is 55%. Left ventricle is smaller than normal. Normal wall thickness. Normal wall motion.   Right Ventricle: Right ventricle is dilated. RV basal diameter is 5.0 cm.   Aortic Valve: Calcified cusps. Mild stenosis of the aortic valve. AV mean gradient is 13 mmHg. AV peak gradient is 24 mmHg. AV peak velocity is 2.5 m/s. AV area by continuity VTI is 1.5 cm2.   Mitral Valve: Thickened leaflets. Mild regurgitation.   Tricuspid Valve: Mild to moderate regurgitation. Tricuspid regurgitant peak velocity is 2.94 m/s. The estimated RVSP is 38 
0926  Last data filed at 3/27/2025 0443  Gross per 24 hour   Intake 9044.53 ml   Output 1475 ml   Net 7569.53 ml     Constitutional: awake and more responsive.  Not in obvious distress.    Skin: Skin color, texture, turgor normal. No rashes or lesions    Head: Normocephalic, without obvious abnormality, atraumatic     ENT:   No epistaxis or rhinorrhea.    Neck:   Supple with no JVD.    Cardiovascular/Edema: regular rate and rhythm, S1, S2 normal, no murmur, click, rub or gallop    Respiratory: Lungs: diminished breath sounds bilaterally    Abdomen: soft, non-tender; bowel sounds normal; no masses,  no organomegaly    Back: symmetric, no curvature. ROM normal. No CVA tenderness.    Extremities: extremities normal, atraumatic, no cyanosis or edema    Neuro:   Delirious but no acute focal deficits noted.    CBC:   Recent Labs     03/25/25  0611 03/26/25  0648   WBC 13.9* 15.9*   HGB 10.2* 11.7*   * 198     BMP:    Recent Labs     03/25/25  1441 03/25/25  2148 03/26/25  0648 03/27/25  0136     --  139 143   K 2.8* 3.0* 3.6* 3.2*   *  --  108* 112*   CO2 22  --  19* 21   BUN 53*  --  46* 43*   CREATININE 3.0*  --  2.6* 2.2*   GLUCOSE 76  --  158* 119*     Lab Results   Component Value Date/Time    NITRU NEGATIVE 03/23/2025 02:34 AM    COLORU Dark Yellow 03/23/2025 02:34 AM    PHUR 5.0 03/23/2025 02:34 AM    PHUR 6.0 03/13/2024 10:47 AM    WBCUA 0 TO 2 03/23/2025 02:34 AM    RBCUA 10 TO 20 03/23/2025 02:34 AM    BACTERIA None 03/23/2025 02:34 AM    LEUKOCYTESUR TRACE 03/23/2025 02:34 AM    UROBILINOGEN Normal 03/23/2025 02:34 AM    BILIRUBINUR NEGATIVE  Verified by ictotest. 03/23/2025 02:34 AM    GLUCOSEU SMALL 03/23/2025 02:34 AM    KETUA TRACE 03/23/2025 02:34 AM     Urine Chloride:    Lab Results   Component Value Date/Time    CLUR 29 03/23/2025 02:30 AM     Urine Protein:     Lab Results   Component Value Date/Time    TPU 53 03/13/2024 10:47 AM    TPU 53 03/13/2024 10:47 AM 
Appearance chronically ill-appearing  HEENT -normocephalic, atraumatic. PERRLA  Lungs -continues to improve with clearing of his airway  Cardiovascular - Heart sounds are normal.  normal rate and rhythm regular, no murmur, gallop or rub.  Abdomen - soft, nontender, nondistended, no masses or organomegaly  Neurologic - CN II-XII are grossly intact. There are no focal motor deficits  Skin - no bruising or bleeding  Extremities - no cyanosis, clubbing; hands are swollen    Lab Results   CBC     Lab Results   Component Value Date/Time    WBC 10.4 03/28/2025 03:20 AM    RBC 3.45 03/28/2025 03:20 AM    RBC 4.30 11/23/2015 09:10 AM    HGB 10.5 03/28/2025 03:20 AM    HCT 32.7 03/28/2025 03:20 AM     03/28/2025 03:20 AM     05/03/2012 05:43 AM    MCV 94.7 03/28/2025 03:20 AM    MCH 30.3 03/28/2025 03:20 AM    MCHC 32.0 03/28/2025 03:20 AM    RDW 15.6 03/28/2025 03:20 AM    LYMPHOPCT 20 03/28/2025 03:20 AM    LYMPHOPCT 29.1 11/23/2015 09:10 AM    MONOPCT 8 03/28/2025 03:20 AM    MYELOPCT 2 03/28/2025 03:20 AM    EOSPCT 2 03/28/2025 03:20 AM    BASOPCT 0 03/28/2025 03:20 AM    MONOSABS 0.83 03/28/2025 03:20 AM    LYMPHSABS 2.08 03/28/2025 03:20 AM    EOSABS 0.21 03/28/2025 03:20 AM    BASOSABS 0.00 03/28/2025 03:20 AM    DIFFTYPE NOT REPORTED 12/31/2020 08:32 AM       BMP   Lab Results   Component Value Date/Time     03/28/2025 03:20 AM    K 3.1 03/28/2025 03:20 AM     03/28/2025 03:20 AM    CO2 23 03/28/2025 03:20 AM    BUN 38 03/28/2025 03:20 AM    CREATININE 1.7 03/28/2025 03:20 AM    GLUCOSE 125 03/28/2025 03:20 AM    GLUCOSE 97 11/23/2015 09:10 AM    MG 1.9 03/28/2025 03:20 AM    PHOS 4.0 06/05/2019 12:00 AM       LFTS  Lab Results   Component Value Date/Time    ALKPHOS 85 03/27/2025 01:36 AM    ALT 34 03/27/2025 01:36 AM    AST 70 03/27/2025 01:36 AM    BILITOT <0.2 03/27/2025 01:36 AM    BILIDIR <0.1 03/27/2025 01:36 AM    IBILI Can not be calculated 03/27/2025 01:36 AM       ABG ABGs:   Lab 
Value Ref Range    POC Glucose 119 (H) 75 - 110 mg/dL   Lactic Acid    Collection Time: 03/27/25  8:34 AM   Result Value Ref Range    Lactic Acid 0.9 0.5 - 2.2 mmol/L     Recent Labs     03/26/25  0705 03/26/25  1102 03/26/25  1604 03/26/25  2030 03/27/25  0735   POCGLU 136* 112* 111* 126* 119*        XR CHEST PORTABLE  Result Date: 3/26/2025  EXAMINATION: ONE XRAY VIEW OF THE CHEST 3/26/2025 4:58 am COMPARISON: 03/24/2025 radiograph HISTORY: ORDERING SYSTEM PROVIDED HISTORY: Hypotension, sepsis TECHNOLOGIST PROVIDED HISTORY: Hypotension, sepsis Reason for Exam: Hypotension, sepsis Additional signs and symptoms: Hypotension, sepsis Relevant Medical/Surgical History: Hypotension, sepsis FINDINGS: Enteric tube stable.  Interval placement of a right vascular catheter with tip at the level of the right atrium.  A right-sided PICC line has also been placed with tip poorly characterized but appearing at the mid SVC. Cardiomegaly with evidence of prior CABG.  Worsening pattern of severe airspace opacification in the left mid and lower lung zone with increasing left pleural effusion.  Mild vascular congestion slightly improved on the right.  No skeletal abnormality.     Worsening airspace changes in the left lung with increasing pleural effusion.     Echo (TTE) complete (PRN contrast/bubble/strain/3D)  Result Date: 3/25/2025    Left Ventricle: Normal left ventricular systolic function with a visually estimated EF of 55 - 60%. EF by visual approximation is 55%. Left ventricle is smaller than normal. Normal wall thickness. Normal wall motion.   Right Ventricle: Right ventricle is dilated. RV basal diameter is 5.0 cm.   Aortic Valve: Calcified cusps. Mild stenosis of the aortic valve. AV mean gradient is 13 mmHg. AV peak gradient is 24 mmHg. AV peak velocity is 2.5 m/s. AV area by continuity VTI is 1.5 cm2.   Mitral Valve: Thickened leaflets. Mild regurgitation.   Tricuspid Valve: Mild to moderate regurgitation. Tricuspid 
cannula  O2 Device: Nasal cannula  Communication Observation: Dysarthria  Follows Directions: Complex  Dentition: Some missing teeth  Patient Positioning: Upright in bed  Baseline Vocal Quality: Normal  Consistencies Administered: Pureed;Soft and Bite-Sized    Vision/Hearing   Pt. Is [place]    Oral Motor Deficits  Consistencies Administered: Pureed;Soft and Bite-Sized    Oral Phase Dysfunction  Oral Phase  Oral Phase: Exceptions  Oral Phase  Oral Phase - Comment: Pt. demo. prolonged mastication of soft solids.     Indicators of Pharyngeal Phase Dysfunction   Indicators of Pharyngeal Phase Dysfunction  Pharyngeal Phase Comment: Pt. demo. no overt s/s aspiration on pudding and throat clear on soft solids.   Test discontinued as unable to r/o or confirm aspiration at bedside.   ST recommends video swallow study to objectively r/o or confirm aspiration and assess swallow function.        Education  Patient Education: Pt. nilior present, demo. understanding of recommendations  Patient Education Response: Needs reinforcement             Therapy Time  SLP Individual Minutes  Time In: 1108  Time Out: 1116  Minutes: 8        Padmaja Ramirez M.A.CCC/SLP    4/1/2025 11:40 AM        
diabetes mellitus.    Social History:   reports that he has quit smoking. He has never used smokeless tobacco. He reports current alcohol use. He reports that he does not use drugs.     Family History:   Family History   Problem Relation Age of Onset    High Blood Pressure Mother     Heart Disease Mother     Cancer Mother     Heart Disease Father     High Blood Pressure Father        Vitals:  BP (!) 87/54   Pulse 90   Temp 98.6 °F (37 °C) (Axillary)   Resp 23   Ht 1.778 m (5' 10\")   Wt 104.8 kg (231 lb 0.7 oz)   SpO2 100%   BMI 33.15 kg/m²   Temp (24hrs), Av.4 °F (36.9 °C), Min:97.7 °F (36.5 °C), Max:98.6 °F (37 °C)    Recent Labs     25  0726   POCGLU 53* 68* 115* 98       I/O (24Hr):    Intake/Output Summary (Last 24 hours) at 2025 0936  Last data filed at 2025 0548  Gross per 24 hour   Intake 2439.93 ml   Output 275 ml   Net 2164.93 ml       Labs:  Hematology:  Recent Labs     25  0323   WBC 6.6   RBC 2.94*   HGB 9.0*   HCT 27.7*   MCV 94.2   MCH 30.8   MCHC 32.6   RDW 15.7*   *   MPV 8.0     Chemistry:  Recent Labs     25  0352    139 137   K 4.0 4.1 4.0    105 106   CO2 21 21 21   GLUCOSE 145* 130* 111*   BUN 13 14 18   CREATININE 1.2 1.3* 1.4*   ANIONGAP 11 13 10   LABGLOM 58* 53* 48*   CALCIUM 8.3* 8.6 8.3*   PROBNP >70,000* >70,000*  --      Recent Labs     25  16325  1839 25  0726   TSH  --   --   --   --  7.93*  --   --    POCGLU 83 68* 53* 68*  --  115* 98     ABG:  Lab Results   Component Value Date/Time    PHART 7.355 2024 12:30 PM    TTS2WKN 51.7 2024 12:30 PM    PO2ART 57.9 2024 12:30 PM    BDV5MGB 28.9 2024 12:30 PM    PBEA 3.4 2024 12:30 PM    Q2YGKPTS 89.3 2024 12:30 PM    FIO2 INFORMATION NOT PROVIDED 03/15/2024 03:40 PM     Lab Results   Component Value 
moderately dilated. The   right ventricular basal diameter is 50.9 mm. Systolic function is mildly   to moderately reduced.     Technically difficult study definity study performed    The left ventricle appears normal in size mildly increase  LV wall   thickness    Difficult to assess for wall motion abnormality    Mildly reduced LV systolic function ejection fraction 45-50%    The right ventricle appears dilated, impaired function    The left and right atrium appears moderately dilated    The aortic valve is sclerotic, no obvious stenosis, trace regurgitation    Mitral valve leaflet appears thickened, mild regurgitation, no stenosis    Mild tricuspid regurgitation no stenosis    Normal aortic root dimensions    The IVC appears normal in size normal respiratory variation indicating   normal right atrial  filling pressure    No significant pericardial effusion seen      ECHO: 3/2024   Interpretation Summary          Left Ventricle: Moderately reduced left ventricular systolic function with a visually estimated EF of 30 - 35%. Left ventricle size is normal. Normal wall thickness. Global hypokinesis present. Grade I diastolic dysfunction with normal LAP.    Right Ventricle: Right ventricle is mildly dilated.    Aortic Valve: Trileaflet valve. Mildly calcified cusp. Mild stenosis of the aortic valve. Velocities may be underestimated due to reduced systolic function. AV mean gradient is 13 mmHg. AV area by continuity VTI is 1.0 cm2.    Mitral Valve: Mild regurgitation.    Tricuspid Valve: Normal RVSP. The estimated RVSP is 23 mmHg.    Left Atrium: Left atrium is mildly dilated.    Right Atrium: Right atrium is mildly dilated.    Image quality is technically difficult.     CATH Conclusion 3/15/2024     Multivessel coronary artery disease.    Patent ORNELAS to LAD.    Occluded SVG to ramus.    Occluded RCA with collaterals from LAD which is supplied by LIMA.    Moderate disease of left circumflex with 40 to 50% stenosis.    
    IMPRESSION/RECOMMENDATIONS:      1.  Acute kidney injury - most consistent with prerenal azotemia,  Toxic acute tubular necrosis secondary to rhabdomyolysis and ischemic acute tubular necrosis from hypotension.  Hepatitis B and C serologies are negative and complements are within normal.   Renal function is slowly improving with hydration and BUN/creatinine have trended down today to 1.1 mg/dL today..    2.  Hypotension -   Hemodynamic profile is improved a   Continue midodrine    3.   Hypo glycemia-on D10 IV fluids    Prognosis is guarded.     Hitesh Platt MD   Attending Nephrologist  3/31/2025 7:40 PM              
consistent with prerenal azotemia,  Toxic acute tubular necrosis secondary to rhabdomyolysis and ischemic acute tubular necrosis from hypotension.  Hepatitis B and C serologies are negative and complements are within normal.   Renal function is slowly improving with hydration and BUN/creatinine have trended down today to 27/1.3 mg/dL today..  Plan: Keep systolic blood pressure greater than 95 mmHg.  Monitor urine output closely.  Avoid nephrotoxic agents.  Basic metabolic profile daily.    2.  Hypotension -   Hemodynamic profile is improved and we will decrease midodrine to 5 mg p.o. 3 times daily.      3.  Fluid overload - agree with discontinuation of IV fluid.    Prognosis is guarded.     Jadiel Villarreal MD FACP  Attending Nephrologist  3/30/2025 6:34 AM              
necrosis secondary to rhabdomyolysis and ischemic acute tubular necrosis from hypotension.   Azotemia is slowly worsening again and patient appears clinically dehydrated.  IV fluids 0.9 normal saline at 50 mL/h x 12 hours.  Random urine chloride    2.  Hypotension - midodrine has been increased to 15 mg 3 times daily.  Patient has evidence of decreased effective arterial volume.   We will add fludrocortisone 0.1 mg p.o. daily.   We will check random urine chloride.    3.   Left lower lobe pain - on IV Zosyn    Prognosis is guarded to poor.     Jadiel Villarreal MD   Attending Nephrologist  4/4/2025 7:25 AM              
negative and complements are within normal.  Acute on chronic confusion may relate to uremic encephalopathy and although he is of advanced age, temporary hemodialysis is indicated to probably help with neurologic improvement.  Plan: Change IV fluid to 0.9 normal saline at 125 mL/h.  Keep systolic blood pressure greater than 95 mmHg.  Consult general surgery to place temporary hemodialysis catheter.  Acute hemodialysis today for 2 hours with no ultrafiltration.  Monitor urine output closely.  Avoid nephrotoxic agents.  Basic metabolic profile daily.    2.  Hypotension - will give fluid bolus and increase midodrine to 10 mg p.o. 3 times daily.  Continue empiric antibiotics whilst awaiting culture results.    3.  Hypernatremia - estimated free water deficit 3 L. Will change to hypotonic IV fluid once hemodynamic profile improves.    Prognosis is guarded    Jadiel Villarreal MD FACP  Attending Nephrologist  3/24/2025 7:18 AM              
Value Ref Range    Lactic Acid 1.0 0.5 - 2.2 mmol/L   POC Glucose Fingerstick    Collection Time: 03/26/25  7:05 AM   Result Value Ref Range    POC Glucose 136 (H) 75 - 110 mg/dL     Recent Labs     03/25/25  1156 03/25/25  1608 03/25/25  1724 03/25/25  2120 03/26/25  0705   POCGLU 71* 49* 126* 116* 136*        XR CHEST PORTABLE  Result Date: 3/26/2025  EXAMINATION: ONE XRAY VIEW OF THE CHEST 3/26/2025 4:58 am COMPARISON: 03/24/2025 radiograph HISTORY: ORDERING SYSTEM PROVIDED HISTORY: Hypotension, sepsis TECHNOLOGIST PROVIDED HISTORY: Hypotension, sepsis Reason for Exam: Hypotension, sepsis Additional signs and symptoms: Hypotension, sepsis Relevant Medical/Surgical History: Hypotension, sepsis FINDINGS: Enteric tube stable.  Interval placement of a right vascular catheter with tip at the level of the right atrium.  A right-sided PICC line has also been placed with tip poorly characterized but appearing at the mid SVC. Cardiomegaly with evidence of prior CABG.  Worsening pattern of severe airspace opacification in the left mid and lower lung zone with increasing left pleural effusion.  Mild vascular congestion slightly improved on the right.  No skeletal abnormality.     Worsening airspace changes in the left lung with increasing pleural effusion.     Echo (TTE) complete (PRN contrast/bubble/strain/3D)  Result Date: 3/25/2025    Left Ventricle: Normal left ventricular systolic function with a visually estimated EF of 55 - 60%. EF by visual approximation is 55%. Left ventricle is smaller than normal. Normal wall thickness. Normal wall motion.   Right Ventricle: Right ventricle is dilated. RV basal diameter is 5.0 cm.   Aortic Valve: Calcified cusps. Mild stenosis of the aortic valve. AV mean gradient is 13 mmHg. AV peak gradient is 24 mmHg. AV peak velocity is 2.5 m/s. AV area by continuity VTI is 1.5 cm2.   Mitral Valve: Thickened leaflets. Mild regurgitation.   Tricuspid Valve: Mild to moderate regurgitation. 
of care    Palliative care encounter    Unable to ambulate    Acute renal failure (ARF)    Dementia (HCC)    Hypoglycemia    Hyperkalemia    Elevated liver enzymes    History of seizure    Toxic metabolic encephalopathy       Plan:   Upper tract imaging was unremarkable from  standpoint.   Bergeron can be removed at attendings discretion for void trial.   Continue flomax at discharge.  Urology will sign off.   Please call with any additional questions or concerns.      He can fu ~4 weeks after discharge for repeat PVR (Dr. Hoffmann, [facility] office)  Please have pt/family call to make fu appt.     Electronically signed by KINDRA Ro CNP on 3/27/2025 at 8:46 AM        
redness, tenderness in the calves  Skin:  no gross lesions, rashes, induration    Assessment:        Hospital Problems           Last Modified POA    * (Principal) Acute renal failure (ARF) 3/23/2025 Yes    Counseling regarding advanced care planning and goals of care 3/24/2025 Yes    Dementia (HCC) 3/24/2025 Yes    Hypoglycemia 3/25/2025 Yes    Hyperkalemia 3/25/2025 Yes    Elevated liver enzymes 3/25/2025 Yes    History of seizure 3/25/2025 Yes    Toxic metabolic encephalopathy 3/25/2025 Yes    Constipation 3/28/2025 Yes    Stercoral colitis 3/28/2025 Yes       Plan:        89-year-old male initially presented on 3/22/2025 from a facility for agitation. On arrival here patient was noted to be in acute renal failure with severe electrolyte abnormalities, rhabdomyolysis, 1 episode of dialysis on 3/24. Also noted to be in shock, requiring pressor support. 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 Being treated for pneumonia as well. Neurology was also consulted for acute metabolic encephalopathy, signed off yesterday, medications adjusted for seizure.  Septic shock improved patient weaned off of Levophed, acute kidney injury, dialysis catheter was removed on March 29,  Patient having some diarrhea gastroenterology on board, planning for colonoscopy, overall prognosis very poor, upper extremity swelling possible due to malnutrition  Transferred out of ICU on March 30  Mentation improved on April 1, NG in place, speech on board, swallow study ordered, had long discussion with sukumar Greenberg,  If patient fails swallow study neck step will be feeding tube placement, she will discuss in detail,  Hypoglycemia om D10,   Check cortisol     Overall prognosis very poor  Disposition 5d    Aure Morales MD  4/1/2025  1:32 PM     Please note that this chart was generated using voice recognition Dragon 
significant hyperkalemia  Hyperkalemia with K 6.6  Hypotension  Hypernatremia  Seizure disorder  Rhabdomyolysis  Chronic systolic CHF with last LVEF 36% on MUGA  CAD/MVD with cath 3/2024 as above - med management  Chronic subdural hematoma s/p craniotomy 2019  DNRCC-A status    Patient Active Problem List:     Spinal stenosis, lumbar region, without neurogenic claudication     Arthropathy, unspecified, other specified sites     CAD (coronary artery disease)     Primary hypertension     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     Benign hypertensive heart disease without congestive heart failure     Chronic ischemic heart disease     Hyperlipidemia     Mitral valve disease     Obesity     Presence of aortocoronary bypass graft     Presence of coronary angioplasty implant and graft     Pure hypercholesterolemia     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)     SOB (shortness of breath)     Subdural hematoma (HCC)     Infection of prosthetic left knee joint     New onset of congestive heart failure (HCC)     Leg swelling     NADIR (acute kidney injury)     Acute systolic congestive heart failure (HCC)     Congestive heart failure (CHF) (HCC)     Burn of left thigh     Ischemic cardiomyopathy     Benign prostatic hyperplasia     Delirium     Metabolic encephalopathy     History of subdural hematoma     Localization-related (focal) (partial) idiopathic epilepsy and epileptic syndromes with seizures of localized onset, not intractable, without status epilepticus (HCC)     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 
support.               Electronically signed by   Yadira Lopez RN  Palliative Care Team  on 3/25/2025 at 9:51 AM   
transcription system. Every effort was made to ensure accuracy. However, inadvertent computerized transcription errors may be present.    Karin Aaron, NP-C, MSN  [facility]  
Glucose 105 75 - 110 mg/dL   Culture, Blood 1    Collection Time: 03/24/25  2:33 PM    Specimen: Blood   Result Value Ref Range    Specimen Description .BLOOD     Special Requests          Culture NO GROWTH 12 HOURS    Lactic Acid    Collection Time: 03/24/25  2:34 PM   Result Value Ref Range    Lactic Acid 2.7 (H) 0.5 - 2.2 mmol/L   Troponin    Collection Time: 03/24/25  2:34 PM   Result Value Ref Range    Troponin, High Sensitivity 312 (HH) 0 - 22 ng/L   Basic Metabolic Panel    Collection Time: 03/24/25  2:34 PM   Result Value Ref Range    Sodium 150 (H) 136 - 145 mmol/L    Potassium 4.0 3.7 - 5.3 mmol/L    Chloride 110 (H) 98 - 107 mmol/L    CO2 22 20 - 31 mmol/L    Anion Gap 18 (H) 9 - 16 mmol/L    Glucose 91 74 - 99 mg/dL     (HH) 8 - 23 mg/dL    Creatinine 5.7 (HH) 0.7 - 1.2 mg/dL    Est, Glom Filt Rate 9 (L) >60 mL/min/1.73m2    Calcium 8.4 (L) 8.6 - 10.4 mg/dL   Culture, Blood 1    Collection Time: 03/24/25  2:51 PM    Specimen: Blood   Result Value Ref Range    Specimen Description .BLOOD LEFT ARM     Special Requests          Culture NO GROWTH 12 HOURS    POC Glucose Fingerstick    Collection Time: 03/24/25  4:52 PM   Result Value Ref Range    POC Glucose 41 (L) 75 - 110 mg/dL   POC Glucose Fingerstick    Collection Time: 03/24/25  5:21 PM   Result Value Ref Range    POC Glucose 47 (L) 75 - 110 mg/dL   POC Glucose Fingerstick    Collection Time: 03/24/25  5:57 PM   Result Value Ref Range    POC Glucose 92 75 - 110 mg/dL   POC Glucose Fingerstick    Collection Time: 03/24/25  6:36 PM   Result Value Ref Range    POC Glucose 79 75 - 110 mg/dL   POC Glucose Fingerstick    Collection Time: 03/24/25  8:03 PM   Result Value Ref Range    POC Glucose 98 75 - 110 mg/dL   Basic Metabolic Panel    Collection Time: 03/24/25  8:52 PM   Result Value Ref Range    Sodium 138 136 - 145 mmol/L    Potassium 3.4 (L) 3.7 - 5.3 mmol/L    Chloride 105 98 - 107 mmol/L    CO2 20 20 - 31 mmol/L    Anion Gap 13 9 - 16 mmol/L 
advanced prior to use.             On admission         Review of Systems:     As recorded in HPI          Physical Examination:        Vitals:    03/28/25 0545 03/28/25 0600 03/28/25 0615 03/28/25 0630   BP: 127/61 (!) 142/49 (!) 122/55 (!) 97/51   Pulse: (!) 107 93 93 80   Resp: 22 23 26 26   Temp:       TempSrc:       SpO2: 100% 100% 100% 100%   Weight:       Height:           Recent Labs     03/27/25  0735 03/27/25  1134 03/27/25  1656 03/28/25  0713   POCGLU 119* 126* 101 96       Intake/Output Summary (Last 24 hours) at 3/28/2025 0844  Last data filed at 3/28/2025 0552  Gross per 24 hour   Intake 5581.12 ml   Output 700 ml   Net 4881.12 ml       General Appearance: Very lethargic, open eyes to command, on oxygen  Mental status: Did not answer orientation question  Head:  normocephalic, atraumatic.  Eye: no icterus, redness, pupils equal and reactive, extraocular eye movements intact, conjunctiva clear  Ear: normal external ear, no discharge, hearing intact  Nose:  no drainage noted  Mouth: mucous membranes moist  Neck: supple, no carotid bruits, thyroid not palpable, has Kumar catheter placement  Lungs: Bilateral equal air entry, clear to ausculation, no wheezing, rales or rhonchi, normal effort  Cardiovascular: normal rate, regular rhythm, no murmur, gallop, rub.  Abdomen: Soft, nontender, nondistended, normal bowel sounds, no hepatomegaly or splenomegaly, has Bergeron's catheter  Neurologic: There are no new focal motor or sensory deficits,   Skin: No gross lesions, rashes, bruising or bleeding on exposed skin area  Extremities: 2+ pitting pedal edema present  Psych:             Data:     PLabs:    BMP:   Recent Labs     03/27/25  0136 03/27/25  1159 03/27/25  1909 03/28/25  0320     --   --  141   K 3.2*   < > 3.5* 3.1*   CO2 21  --   --  23   BUN 43*  --   --  38*   CREATININE 2.2*  --   --  1.7*   LABGLOM 28*  --   --  38*   GLUCOSE 119*  --   --  125*    < > = values in this interval not displayed. 
encephalopathy      Plan of Treatment:   Elevated trops, likely type II MI secondary to demand ischemia.  Previously, pt did not want any aggressive workup, and requesting medical management only?? Per notes. No family here at present & patient confused.  Did have cardiac cath last year and planned med management.  Has hx of CABG.    Volume management per nephrology.  Appreciate assistance. NADIR slowing improving.    ECHO from yesterday shows improved EF.  Farxiga, entresto, BB and aldactone all on hold.   Ok for IV fluids from cardiology standpoint as EF has improved - defer volume management to nephrology  On pressors.  Wean as able.  Continue midodrine.    Keep K> 4 and Mg >2. KCL replacement being given, will also give 2gm MG  PNA.  Pulm managing   Will follow. Discussed with RN at bedside    Electronically signed by KINDRA Barreto CNP on 3/27/2025 at 9:37 AM  [facility]  [phone]         
generated using voice recognition Dragon dictation software.  Although every effort was made to ensure the accuracy of this automated transcription, some errors in transcription may have occurred.   
poor  Disposition to long-term soon  Aure Morales MD  4/2/2025  1:09 PM     Please note that this chart was generated using voice recognition Dragon dictation software.  Although every effort was made to ensure the accuracy of this automated transcription, some errors in transcription may have occurred.   
septic shock, hypovolemic shock  Pneumonia, getting treated in lines of healthcare associated pneumonia, MRSA nasal swab is positive, on Zyvox and Zosyn, pulmonary medicine following, cultures negative so far  On bolus tube feed  Diabetes, controlled  Patient is DNR CCA, no intubation  CT abdomen pelvis concerning for stercoral colitis, patient passing stools  Patient had urinary retention, evaluated by urologist, has Bergeron's catheter  Overall prognosis very guarded, high chance of clinical deterioration  Hypokalemia, will replace lites  Chest x-ray seen, has worsening show dose, no plan for dialysis today  Had extensive discussion with patient niece at bedside,  Critically sick, seen in ICU  CC time 35 minutes  Discussed with RN   4/6  Patient is DNR CC  Plan to transfer to SNF and will be followed by hospice  DC plan later today   .  Hospital Problems           Last Modified POA    * (Principal) Acute renal failure (ARF) 3/23/2025 Yes    Counseling regarding advanced care planning and goals of care 3/24/2025 Yes    Dementia (HCC) 3/24/2025 Yes    Hypoglycemia 3/25/2025 Yes    Hyperkalemia 3/25/2025 Yes    Elevated liver enzymes 3/25/2025 Yes    History of seizure 3/25/2025 Yes    Toxic metabolic encephalopathy 3/25/2025 Yes    Constipation 3/28/2025 Yes    Stercoral colitis 3/28/2025 Yes               Thanks for consulting us.  Will monitor vitals and clinical course, and optimize therapy as needed .           Ryan Deluna MD  4/6/2025   
to be in acute renal failure with severe electrolyte abnormalities, rhabdomyolysis, 1 episode of dialysis on 3/24.  Also noted to be in shock, requiring pressor support.  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  Being treated for pneumonia as well.  Neurology was also consulted for acute metabolic encephalopathy, signed off yesterday, medications adjusted for seizure.    Patient seen and examined at bedside.  Being weaned off Levophed.  NADIR improving.  Potassium is low, being replaced as per protocol.  Currently on room air.  Repeat chest x-ray tomorrow.  Kumar catheter in place, will remove if no further dialysis planned by nephrology.  Defer to nephrology.  Moderate-sized stool ball in the rectum with stercoral colitis on imaging.  Recommend direct visualization to exclude mass.  Will consult GI.    3/29  Patient seen and examined at bedside.   Afebrile, vital signs are stable.  Mentation waxing and waning.  Having bowel movements.  GI consulted yesterday.  Planning for flex sig and colonoscopy once patient more stable.  NADIR is improving.  Plan to remove hemodialysis catheter as per nephrology.  Chest x-ray reviewed.    3/30  Patient seen and examined at bedside.  Heart rate on the higher side, likely due to agitation.  NADIR is improving, fluid management as per nephrology  Having bowel movements  GI input noted  Neurology input noted for AED.  Thrombocytopenia, stable.  Bilateral upper extremity swelling, likely due to fluids, elevate upper extremities.  Will DC IV fluids.  Kumar catheter removed 3/29/2025.  Okay to transfer to PCU.      Dimple Castellon MD  3/30/2025   
Not Detected Not Detected    Influenza A by PCR Not Detected Not Detected    Influenza B by PCR Not Detected Not Detected    Parainfluenza 1 PCR Not Detected Not Detected    Parainfluenza 2 PCR Not Detected Not Detected    Parainfluenza 3 PCR Not Detected Not Detected    Parainfluenza 4 PCR Not Detected Not Detected    Resp Syncytial Virus PCR Not Detected Not Detected    Bordetella parapertussis by PCR Not Detected Not Detected    B Pertussis by PCR Not Detected Not Detected    Chlamydia pneumoniae By PCR Not Detected Not Detected    Mycoplasma pneumo by PCR Not Detected Not Detected   POC Glucose Fingerstick    Collection Time: 03/25/25  9:20 PM   Result Value Ref Range    POC Glucose 116 (H) 75 - 110 mg/dL   Lactic Acid    Collection Time: 03/25/25  9:48 PM   Result Value Ref Range    Lactic Acid 1.8 0.5 - 2.2 mmol/L   Potassium    Collection Time: 03/25/25  9:48 PM   Result Value Ref Range    Potassium 3.0 (L) 3.7 - 5.3 mmol/L   CBC with Auto Differential    Collection Time: 03/26/25  6:48 AM   Result Value Ref Range    WBC 15.9 (H) 3.5 - 11.0 k/uL    RBC 3.88 (L) 4.5 - 5.9 m/uL    Hemoglobin 11.7 (L) 13.5 - 17.5 g/dL    Hematocrit 36.0 (L) 41 - 53 %    MCV 92.9 80 - 100 fL    MCH 30.1 26 - 34 pg    MCHC 32.4 31 - 37 g/dL    RDW 15.0 (H) 11.5 - 14.9 %    Platelets 198 150 - 450 k/uL    MPV 8.4 6.0 - 12.0 fL    Neutrophils % PENDING %    Lymphocytes % PENDING %    Monocytes % PENDING %    Eosinophils % PENDING %    Basophils % PENDING %    Neutrophils Absolute PENDING k/uL    Lymphocytes Absolute PENDING k/uL    Monocytes Absolute PENDING k/uL    Eosinophils Absolute PENDING k/uL    Basophils Absolute PENDING 0.0 - 0.2 k/uL   Basic Metabolic Panel w/ Reflex to MG    Collection Time: 03/26/25  6:48 AM   Result Value Ref Range    Sodium 139 136 - 145 mmol/L    Potassium 3.6 (L) 3.7 - 5.3 mmol/L    Chloride 108 (H) 98 - 107 mmol/L    CO2 19 (L) 20 - 31 mmol/L    Anion Gap 12 9 - 16 mmol/L    Glucose 158 (H) 74 - 99 
importantly because of direct risk to patient if care not provided in a hospital setting.    Ivan Ratliff MD  3/27/2025  8:45 AM    Copy sent to Dr. Metcalf, Bruno ARAUJO, KINDRA - CNP   
symptoms: Pt unable to provide further history; Per ED note....sent in from his facility for altered mentation.  Patient was being more aggressive with staff and was unable to be oriented; Hx dementia FINDINGS: BRAIN/VENTRICLES: There is no acute intracranial hemorrhage, mass effect or midline shift. No abnormal extra-axial fluid collection.  The gray-white differentiation is maintained without evidence of an acute infarct. There is prominence of the ventricles and sulci due to global parenchymal volume loss. There are nonspecific areas of hypoattenuation within the periventricular and subcortical white matter, which likely represent chronic microvascular ischemic change. Prior right frontal parietal craniotomy. Stable appearing area of dural thickening or scarring deep to the craniotomy site. ORBITS: The visualized portion of the orbits demonstrate no acute abnormality. SINUSES: Mucosal thickening involving the left maxillary sinus with associated inspissated material, unchanged from the prior exam.  Remainder of the imaged paranasal sinuses and mastoid air cells are well aerated.  Imaged orbits are normal appearance. SOFT TISSUES/SKULL:  No acute abnormality of the visualized skull or soft tissues.     Age related findings in the brain and findings likely related to microvascular ischemic disease. No acute intracranial process identified. Prior right frontal parietal craniotomy with stable appearing area of dural thickening or scarring deep to the craniotomy site. Stable left maxillary disease.     CT ABDOMEN PELVIS WO CONTRAST  Result Date: 3/23/2025  EXAMINATION: CT OF THE ABDOMEN AND PELVIS WITHOUT CONTRAST 3/23/2025 11:59 am TECHNIQUE: CT of the abdomen and pelvis was performed without the administration of intravenous contrast. Multiplanar reformatted images are provided for review. Automated exposure control, iterative reconstruction, and/or weight based adjustment of the mA/kV was utilized to reduce the

## 2025-04-10 ENCOUNTER — TELEPHONE (OUTPATIENT)
Dept: PRIMARY CARE CLINIC | Age: 89
End: 2025-04-10

## 2025-04-11 NOTE — DISCHARGE SUMMARY
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 male who was admitted for the management of Acute renal failure (ARF) , presented to ER with Aggressive Behavior, Hypoglycemia, and Hip Pain  Patient, has past medical history of multiple medical problem which include coronary artery disease status post CABG, multiple stent, history of subdural hematoma status postcraniotomy, history of seizure disorder on antiepileptics, diabetes, hypertension, hyperlipidemia, admitted to [facility] with worsening mentation  Patient was found to have NADIR, required dialysis  Patient, had a prolonged hospital course, was in ICU for long period of time required IV pressors  Patient was also on continuous tube feed  Evaluated by palliative care, nephrologist, critical care while in house  Patient, family changed CODE STATUS to DNR  Getting discharged to

## 2025-04-17 ENCOUNTER — TELEPHONE (OUTPATIENT)
Dept: PRIMARY CARE CLINIC | Age: 89
End: 2025-04-17

## (undated) DEVICE — DEFENDO AIR WATER SUCTION AND BIOPSY VALVE KIT FOR  OLYMPUS: Brand: DEFENDO AIR/WATER/SUCTION AND BIOPSY VALVE

## (undated) DEVICE — SURGICAL PROCEDURE TRAY CRD CATH SVMMC

## (undated) DEVICE — ENDO KIT W/SYRINGE: Brand: MEDLINE INDUSTRIES, INC.

## (undated) DEVICE — SINGLE-USE BIOPSY FORCEPS: Brand: RADIAL JAW 4

## (undated) DEVICE — ANGIOGRAPHIC CATHETER: Brand: EXPO™